# Patient Record
Sex: FEMALE | Race: WHITE | Employment: FULL TIME | URBAN - METROPOLITAN AREA
[De-identification: names, ages, dates, MRNs, and addresses within clinical notes are randomized per-mention and may not be internally consistent; named-entity substitution may affect disease eponyms.]

---

## 2017-05-30 ENCOUNTER — HOSPITAL ENCOUNTER (OUTPATIENT)
Dept: LAB | Age: 47
Discharge: HOME | End: 2017-05-30
Payer: COMMERCIAL

## 2017-05-30 PROCEDURE — 87338 HPYLORI STOOL AG IA: CPT | Performed by: NURSE PRACTITIONER

## 2017-06-02 LAB
H PYLORI AG STL QL IA: NEGATIVE
SPECIMEN SOURCE: NORMAL

## 2017-06-23 ENCOUNTER — HOSPITAL ENCOUNTER (EMERGENCY)
Age: 47
Discharge: HOME | End: 2017-06-23
Attending: FAMILY MEDICINE
Payer: COMMERCIAL

## 2017-06-23 ENCOUNTER — APPOINTMENT (OUTPATIENT)
Dept: GENERAL RADIOLOGY | Age: 47
End: 2017-06-23
Attending: FAMILY MEDICINE
Payer: COMMERCIAL

## 2017-06-23 VITALS
OXYGEN SATURATION: 98 % | WEIGHT: 150 LBS | SYSTOLIC BLOOD PRESSURE: 117 MMHG | HEART RATE: 118 BPM | RESPIRATION RATE: 36 BRPM | BODY MASS INDEX: 25.61 KG/M2 | DIASTOLIC BLOOD PRESSURE: 68 MMHG | HEIGHT: 64 IN | TEMPERATURE: 98.4 F

## 2017-06-23 DIAGNOSIS — J01.10 ACUTE NON-RECURRENT FRONTAL SINUSITIS: ICD-10-CM

## 2017-06-23 DIAGNOSIS — J45.31 MILD PERSISTENT ASTHMA WITH ACUTE EXACERBATION: Primary | ICD-10-CM

## 2017-06-23 LAB
ANION GAP BLD CALC-SCNC: 9 MMOL/L (ref 3–18)
BASOPHILS # BLD AUTO: 0 K/UL (ref 0–0.06)
BASOPHILS # BLD: 0 % (ref 0–2)
BUN SERPL-MCNC: 14 MG/DL (ref 7–18)
BUN/CREAT SERPL: 14 (ref 12–20)
CALCIUM SERPL-MCNC: 9.6 MG/DL (ref 8.5–10.1)
CHLORIDE SERPL-SCNC: 101 MMOL/L (ref 100–108)
CK MB CFR SERPL CALC: NORMAL % (ref 0–4)
CK MB SERPL-MCNC: <1 NG/ML (ref 5–25)
CK SERPL-CCNC: 40 U/L (ref 26–192)
CO2 SERPL-SCNC: 30 MMOL/L (ref 21–32)
CREAT SERPL-MCNC: 1.02 MG/DL (ref 0.6–1.3)
DIFFERENTIAL METHOD BLD: ABNORMAL
EOSINOPHIL # BLD: 0.6 K/UL (ref 0–0.4)
EOSINOPHIL NFR BLD: 6 % (ref 0–5)
ERYTHROCYTE [DISTWIDTH] IN BLOOD BY AUTOMATED COUNT: 13.5 % (ref 11.6–14.5)
GLUCOSE SERPL-MCNC: 103 MG/DL (ref 74–99)
HCT VFR BLD AUTO: 44.6 % (ref 35–45)
HGB BLD-MCNC: 15.2 G/DL (ref 12–16)
LYMPHOCYTES # BLD AUTO: 19 % (ref 21–52)
LYMPHOCYTES # BLD: 2.1 K/UL (ref 0.9–3.6)
MCH RBC QN AUTO: 31.2 PG (ref 24–34)
MCHC RBC AUTO-ENTMCNC: 34.1 G/DL (ref 31–37)
MCV RBC AUTO: 91.6 FL (ref 74–97)
MONOCYTES # BLD: 0.7 K/UL (ref 0.05–1.2)
MONOCYTES NFR BLD AUTO: 7 % (ref 3–10)
NEUTS SEG # BLD: 7.6 K/UL (ref 1.8–8)
NEUTS SEG NFR BLD AUTO: 68 % (ref 40–73)
PLATELET # BLD AUTO: 276 K/UL (ref 135–420)
PMV BLD AUTO: 8.9 FL (ref 9.2–11.8)
POTASSIUM SERPL-SCNC: 3.9 MMOL/L (ref 3.5–5.5)
RBC # BLD AUTO: 4.87 M/UL (ref 4.2–5.3)
SODIUM SERPL-SCNC: 140 MMOL/L (ref 136–145)
TROPONIN I SERPL-MCNC: <0.02 NG/ML (ref 0–0.06)
WBC # BLD AUTO: 11 K/UL (ref 4.6–13.2)

## 2017-06-23 PROCEDURE — 94640 AIRWAY INHALATION TREATMENT: CPT

## 2017-06-23 PROCEDURE — 74011250636 HC RX REV CODE- 250/636: Performed by: FAMILY MEDICINE

## 2017-06-23 PROCEDURE — 77030029684 HC NEB SM VOL KT MONA -A

## 2017-06-23 PROCEDURE — 77030013140 HC MSK NEB VYRM -A

## 2017-06-23 PROCEDURE — 96375 TX/PRO/DX INJ NEW DRUG ADDON: CPT

## 2017-06-23 PROCEDURE — 96365 THER/PROPH/DIAG IV INF INIT: CPT

## 2017-06-23 PROCEDURE — 85025 COMPLETE CBC W/AUTO DIFF WBC: CPT | Performed by: FAMILY MEDICINE

## 2017-06-23 PROCEDURE — 80048 BASIC METABOLIC PNL TOTAL CA: CPT | Performed by: FAMILY MEDICINE

## 2017-06-23 PROCEDURE — 71010 XR CHEST PORT: CPT

## 2017-06-23 PROCEDURE — 82550 ASSAY OF CK (CPK): CPT | Performed by: FAMILY MEDICINE

## 2017-06-23 PROCEDURE — 93005 ELECTROCARDIOGRAM TRACING: CPT

## 2017-06-23 PROCEDURE — 96361 HYDRATE IV INFUSION ADD-ON: CPT

## 2017-06-23 PROCEDURE — 74011000250 HC RX REV CODE- 250: Performed by: FAMILY MEDICINE

## 2017-06-23 PROCEDURE — 99285 EMERGENCY DEPT VISIT HI MDM: CPT

## 2017-06-23 RX ORDER — DEXAMETHASONE SODIUM PHOSPHATE 4 MG/ML
10 INJECTION, SOLUTION INTRA-ARTICULAR; INTRALESIONAL; INTRAMUSCULAR; INTRAVENOUS; SOFT TISSUE
Status: COMPLETED | OUTPATIENT
Start: 2017-06-23 | End: 2017-06-23

## 2017-06-23 RX ORDER — ALBUTEROL SULFATE 0.83 MG/ML
5 SOLUTION RESPIRATORY (INHALATION) ONCE
Status: COMPLETED | OUTPATIENT
Start: 2017-06-23 | End: 2017-06-23

## 2017-06-23 RX ORDER — ALBUTEROL SULFATE 90 UG/1
2 AEROSOL, METERED RESPIRATORY (INHALATION)
Qty: 1 INHALER | Refills: 0 | Status: SHIPPED | OUTPATIENT
Start: 2017-06-23 | End: 2017-10-12

## 2017-06-23 RX ORDER — AMOXICILLIN 875 MG/1
875 TABLET, FILM COATED ORAL 2 TIMES DAILY
Qty: 20 TAB | Refills: 0 | Status: SHIPPED | OUTPATIENT
Start: 2017-06-23 | End: 2017-07-03

## 2017-06-23 RX ORDER — MAGNESIUM SULFATE HEPTAHYDRATE 40 MG/ML
2 INJECTION, SOLUTION INTRAVENOUS ONCE
Status: COMPLETED | OUTPATIENT
Start: 2017-06-23 | End: 2017-06-23

## 2017-06-23 RX ORDER — IPRATROPIUM BROMIDE AND ALBUTEROL SULFATE 2.5; .5 MG/3ML; MG/3ML
3 SOLUTION RESPIRATORY (INHALATION)
Status: COMPLETED | OUTPATIENT
Start: 2017-06-23 | End: 2017-06-23

## 2017-06-23 RX ORDER — METHYLPREDNISOLONE 4 MG/1
TABLET ORAL
Qty: 1 DOSE PACK | Refills: 0 | Status: SHIPPED | OUTPATIENT
Start: 2017-06-23 | End: 2017-08-03 | Stop reason: ALTCHOICE

## 2017-06-23 RX ORDER — HYDROCODONE POLISTIREX AND CHLORPHENIRAMINE POLISTIREX 10; 8 MG/5ML; MG/5ML
5 SUSPENSION, EXTENDED RELEASE ORAL
Qty: 60 ML | Refills: 0 | Status: SHIPPED | OUTPATIENT
Start: 2017-06-23 | End: 2017-08-03 | Stop reason: ALTCHOICE

## 2017-06-23 RX ADMIN — ALBUTEROL SULFATE 5 MG: 2.5 SOLUTION RESPIRATORY (INHALATION) at 12:42

## 2017-06-23 RX ADMIN — SODIUM CHLORIDE 1000 ML: 900 INJECTION, SOLUTION INTRAVENOUS at 11:18

## 2017-06-23 RX ADMIN — IPRATROPIUM BROMIDE AND ALBUTEROL SULFATE 3 ML: .5; 3 SOLUTION RESPIRATORY (INHALATION) at 11:10

## 2017-06-23 RX ADMIN — MAGNESIUM SULFATE IN WATER 2 G: 40 INJECTION, SOLUTION INTRAVENOUS at 11:18

## 2017-06-23 RX ADMIN — SODIUM CHLORIDE 1000 ML: 900 INJECTION, SOLUTION INTRAVENOUS at 13:01

## 2017-06-23 RX ADMIN — ALBUTEROL SULFATE 5 MG: 2.5 SOLUTION RESPIRATORY (INHALATION) at 11:21

## 2017-06-23 RX ADMIN — DEXAMETHASONE SODIUM PHOSPHATE 10 MG: 4 INJECTION, SOLUTION INTRAMUSCULAR; INTRAVENOUS at 11:31

## 2017-06-23 NOTE — ED NOTES
Nebs complete. Pt remains tachypneic but appears less labored at this time. Magnesium and IV fluids infusing. Pt sitting with HOB raised, on CCM and continuous pulse ox, 02 saturations 100% on RA. Pt declines blanket. Call light within reach.

## 2017-06-23 NOTE — ED NOTES
Pt resting in stretcher with IV fluids infusing. Pt reports feeling better. Wheezing remains present but lessened post neb. Call light within reach. No needs verbalized at this time.

## 2017-06-23 NOTE — ED PROVIDER NOTES
[address]  EMERGENCY DEPARTMENT HISTORY AND PHYSICAL EXAM       Date: 6/23/2017   Patient Name: Hilario Felty   YOB: 1970  Medical Record Number: [id number]    History of Presenting Illness     Chief Complaint   Patient presents with    Shortness of Breath        History Provided By:  patient    Additional History: 11:03 AM  Hilario Felty is a 51 y.o. female who presents to the emergency department C/O shortness of breath and chest tightness starting 2 weeks ago. Associated sxs include cough. Pt has been using Proair with no relief of sxs. Reports cigarette use. Jakub hx of asthma. Denies fever, chest pain, leg swelling, recent travel, and any other sxs or complaints.      Primary Care Provider: Patrick Avalos MD   Specialist:    Past History     Past Medical History:   Past Medical History:   Diagnosis Date    Anxiety     Arthritis     Asthma     pt states she does not have asthma    Chronic pain     Chronic Back Pain     Depression     Gastrointestinal disorder     GERD (gastroesophageal reflux disease)     well controlled w/ meds    Hypertension     no medications currently; off [id number]    IBS (irritable bowel syndrome)     Other ill-defined conditions     bulging disc    Other ill-defined conditions     sinusitis    Tattoo     TATTOO        Past Surgical History:   Past Surgical History:   Procedure Laterality Date    ENDOSCOPY, COLON, DIAGNOSTIC      HX BREAST BIOPSY      left    HX CYST INCISION AND DRAINAGE      left    HX GYN  2011    cysts removed from both ovaries    HX HYSTERECTOMY      partial     LAP,CHOLECYSTECTOMY  2-25-16    Dr. Joseph Recio        Family History:   Family History   Problem Relation Age of Onset   [facility] Breast Cancer Mother     Coronary Artery Disease Other     Breast Cancer Sister         Social History:   Social History   Substance Use Topics    Smoking status: Current Every Day Smoker     Packs/day: 0.00     Last attempt to quit: 10/1/2010    Smokeless tobacco: Never Used    Alcohol use No        Allergies: Allergies   Allergen Reactions    Percocet [Oxycodone-Acetaminophen] Nausea and Vomiting    Advair Diskus [Fluticasone-Salmeterol] Nausea and Vomiting    Ibuprofen Nausea and Vomiting        Review of Systems   Review of Systems   Constitutional: Negative for fatigue and fever. HENT: Negative for rhinorrhea and sore throat. Respiratory: Positive for cough, chest tightness and shortness of breath. Cardiovascular: Negative for chest pain, palpitations and leg swelling. Gastrointestinal: Negative for abdominal pain, diarrhea, nausea and vomiting. Genitourinary: Negative for difficulty urinating and dysuria. Musculoskeletal: Negative for arthralgias and myalgias. Skin: Negative for color change and rash. Neurological: Negative for light-headedness and headaches. All other systems reviewed and are negative. Physical Exam  Vitals:    06/23/17 1242 06/23/17 1300 06/23/17 1315 06/23/17 1330   BP:  130/69 109/64 116/61   Pulse:  (!) 115 (!) 115 (!) 116   Resp:  18 26 24   Temp:       SpO2: 100% 100% 99% 99%   Weight:       Height:           Physical Exam  Vital signs and nursing notes reviewed    CONSTITUTIONAL: Alert, middle aged female in mild respiratory distress; well-developed; well-nourished. HEAD:  Normocephalic, atraumatic  EYES: PERRL; EOM's intact. ENTM: Nose: no rhinorrhea; Throat: no erythema or exudate, mucous membranes moist  Neck:  No JVD, supple without lymphadenopathy  RESP: Tachypneic, with diffuse inspiratory and expiratory wheezes. Frequent cough. CV: Tachycardic, S1 and S2 WNL; No murmurs, gallops or rubs. GI: Normal bowel sounds, abdomen soft and non-tender. No masses or organomegaly. : No costo-vertebral angle tenderness. BACK:  Non-tender  UPPER EXT:  Normal inspection  LOWER EXT: No edema, no calf tenderness. Distal pulses intact.   NEURO: CN intact, reflexes 2/4 and sym, strength 5/5 and sym, sensation intact. SKIN: No rashes; Normal for age and stage. PSYCH:  Alert and oriented, normal affect. Diagnostic Study Results     Labs -      Recent Results (from the past 12 hour(s))   CBC WITH AUTOMATED DIFF    Collection Time: 06/23/17 11:11 AM   Result Value Ref Range    WBC 11.0 4.6 - 13.2 K/uL    RBC 4.87 4.20 - 5.30 M/uL    HGB 15.2 12.0 - 16.0 g/dL    HCT 44.6 35.0 - 45.0 %    MCV 91.6 74.0 - 97.0 FL    MCH 31.2 24.0 - 34.0 PG    MCHC 34.1 31.0 - 37.0 g/dL    RDW 13.5 11.6 - 14.5 %    PLATELET 120 364 - 296 K/uL    MPV 8.9 (L) 9.2 - 11.8 FL    NEUTROPHILS 68 40 - 73 %    LYMPHOCYTES 19 (L) 21 - 52 %    MONOCYTES 7 3 - 10 %    EOSINOPHILS 6 (H) 0 - 5 %    BASOPHILS 0 0 - 2 %    ABS. NEUTROPHILS 7.6 1.8 - 8.0 K/UL    ABS. LYMPHOCYTES 2.1 0.9 - 3.6 K/UL    ABS. MONOCYTES 0.7 0.05 - 1.2 K/UL    ABS. EOSINOPHILS 0.6 (H) 0.0 - 0.4 K/UL    ABS.  BASOPHILS 0.0 0.0 - 0.06 K/UL    DF AUTOMATED     METABOLIC PANEL, BASIC    Collection Time: 06/23/17 11:11 AM   Result Value Ref Range    Sodium 140 136 - 145 mmol/L    Potassium 3.9 3.5 - 5.5 mmol/L    Chloride 101 100 - 108 mmol/L    CO2 30 21 - 32 mmol/L    Anion gap 9 3.0 - 18 mmol/L    Glucose 103 (H) 74 - 99 mg/dL    BUN 14 7.0 - 18 MG/DL    Creatinine 1.02 0.6 - 1.3 MG/DL    BUN/Creatinine ratio 14 12 - 20      GFR est AA >60 >60 ml/min/1.73m2    GFR est non-AA 58 (L) >60 ml/min/1.73m2    Calcium 9.6 8.5 - 10.1 MG/DL   CARDIAC PANEL,(CK, CKMB & TROPONIN)    Collection Time: 06/23/17 11:11 AM   Result Value Ref Range    CK 40 26 - 192 U/L    CK - MB <1.0 <3.6 ng/ml    CK-MB Index Cannot be calulated 0.0 - 4.0 %    Troponin-I, Qt. <0.02 0.00 - 0.06 NG/ML   EKG, 12 LEAD, INITIAL    Collection Time: 06/23/17 11:25 AM   Result Value Ref Range    Ventricular Rate 101 BPM    Atrial Rate 101 BPM    P-R Interval 128 ms    QRS Duration 76 ms    Q-T Interval 330 ms    QTC Calculation (Bezet) 427 ms    Calculated P Axis 30 degrees Calculated R Axis 24 degrees    Calculated T Axis 19 degrees    Diagnosis       Sinus tachycardia  Anterior infarct , age undetermined  Abnormal ECG  When compared with ECG of 21-NOV-2016 14:52,  Anterior infarct is now present         Radiologic Studies -  The following have been ordered and reviewed:  XR CHEST PORT   Final Result   Impression:  No radiographic evidence of an acute abnormality. Stable examination. As read by the radiologist.      Medical Decision Making   I am the first provider for this patient. I reviewed the vital signs, available nursing notes, past medical history, past surgical history, family history and social history. Vital Signs-Reviewed the patient's vital signs. Patient Vitals for the past 12 hrs:   Temp Pulse Resp BP SpO2   06/23/17 1330 - (!) 116 24 116/61 99 %   06/23/17 1315 - (!) 115 26 109/64 99 %   06/23/17 1300 - (!) 115 18 130/69 100 %   06/23/17 1242 - - - - 100 %   06/23/17 1220 - (!) 101 (!) 31 108/63 100 %   06/23/17 1200 - (!) 101 27 113/62 100 %   06/23/17 1146 - (!) 110 15 106/68 100 %   06/23/17 1130 - (!) 101 18 118/74 100 %   06/23/17 1121 - (!) 128 - (!) 119/91 -   06/23/17 1107 98.4 °F (36.9 °C) (!) 140 26 (!) 133/103 100 %       Pulse Oximetry Analysis - Normal 100% on room air     Cardiac Monitor:   Rate: 119 bpm  Rhythm: Sinus Tachycardia     EKG interpretation: (Preliminary)  Rhythm: Sinus tachycardia. Rate (approx.): 101 bpm; Nonspecific changes. QTc 427 ms. EKG read by Eben Brasher MD at 11:25 AM    Old Medical Records: Old medical records. Nursing notes. Provider Notes: INITIAL CLINICAL IMPRESSION and PLANS:  The patient presents with the primary complaint(s) of: chest tightness and shortness of breath. The presentation, to include historical aspects and clinical findings are consistent with the DX of asthma exacerbation. However, other possible DX's to consider as primary, associated with, or exacerbated by include:    1. Pneumonia  2. CHF    Considering the above, my initial management plan to evaluate and therapeutic interventions include the following and as noted in the orders:    1. Labs: Cardiac Panel, BMP, CBC  2. Imaging: Chest X-ray, EKG      Procedures:   Procedures    ED Course:  11:03 AM  Initial assessment performed. The patients presenting problems have been discussed, and they are in agreement with the care plan formulated and outlined with them. I have encouraged them to ask questions as they arise throughout their visit. 12:21 PM The patient is still wheezing. Her blood pressure is a little low. Will give another liter of fluids. 1:49 PM Patient is feeling better and has better air movement. She states that she has been having nasal congestion and sinus drainage for the past 2 weeks which is thick and yellow. Patient is ready for discharge. Medications Given in the ED:  Medications   sodium chloride 0.9 % bolus infusion 1,000 mL (0 mL IntraVENous IV Completed 6/23/17 1250)   magnesium sulfate 2 g/50 ml IVPB (premix or compounded) (0 g IntraVENous IV Completed 6/23/17 1223)   albuterol (PROVENTIL VENTOLIN) nebulizer solution 5 mg (5 mg Nebulization Given 6/23/17 1121)   dexamethasone (DECADRON) 4 mg/mL injection 10 mg (10 mg IntraVENous Given 6/23/17 1131)   albuterol-ipratropium (DUO-NEB) 2.5 MG-0.5 MG/3 ML (3 mL Nebulization Given 6/23/17 1110)   sodium chloride 0.9 % bolus infusion 1,000 mL (1,000 mL IntraVENous New Bag 6/23/17 1301)   albuterol (PROVENTIL VENTOLIN) nebulizer solution 5 mg (5 mg Nebulization Given 6/23/17 1242)       Discharge Note:  1:52 PM   Pt has been reexamined. Patient has no new complaints, changes, or physical findings. Care plan outlined and precautions discussed. Results were reviewed with the patient. All medications were reviewed with the patient; will d/c home with Albuterol, Medrol bonifacio, Tussionex, and Amoxicillin. All of pt's questions and concerns were addressed. Patient was instructed and agrees to follow up with her PCP, as well as to return to the ED upon further deterioration. Patient is ready to go home. Critical Care Time:  I have spent 30 minutes of critical care time involved in lab review, consultations with specialist, family decision-making, and documentation. During this entire length of time I was immediately available to the patient. Critical Care: The reason for providing this level of medical care for this critically ill patient was due a critical illness that impaired one or more vital organ systems such that there was a high probability of imminent or life threatening deterioration in the patients condition. This care involved high complexity decision making to assess, manipulate, and support vital system functions, to treat this degreee vital organ system failure and to prevent further life threatening deterioration of the patients condition. Diagnosis   Clinical Impression:   1. Mild persistent asthma with acute exacerbation    2. Acute non-recurrent frontal sinusitis         Discussion:  Patient presented with trouble breathing she was wheezing, with tachypnea and tachycardia. She had multiple nebs, Magnesium Sulfate, and Solumedrol. She responded well to this. She had no pneumonia, and a normal white count. Her EKG was normal. She is going to be treated for acute sinusitis as well as reactive airway.      Follow-up Information     Follow up With Details Comments Contact Info    Camille Howard MD Schedule an appointment as soon as possible for a visit in 2 days For primary care follow up [address] [place] [address]      [facility] EMERGENCY DEPT  As needed, If symptoms worsen [address] [place] [id number] [phone]          Current Discharge Medication List      START taking these medications    Details   methylPREDNISolone (MEDROL, DANNI,) 4 mg tablet Take as directed  Qty: 1 Dose Pack, Refills: 0 chlorpheniramine-HYDROcodone (TUSSIONEX PENNKINETIC ER) 10-8 mg/5 mL suspension Take 5 mL by mouth every twelve (12) hours as needed for Cough. Max Daily Amount: 10 mL. Qty: 60 mL, Refills: 0      amoxicillin (AMOXIL) 875 mg tablet Take 1 Tab by mouth two (2) times a day for 10 days. Qty: 20 Tab, Refills: 0         CONTINUE these medications which have CHANGED    Details   albuterol (PROVENTIL HFA, VENTOLIN HFA, PROAIR HFA) 90 mcg/actuation inhaler Take 2 Puffs by inhalation every four (4) hours as needed for Wheezing. Qty: 1 Inhaler, Refills: 0             _______________________________   Attestations: This note is prepared by Isaac Lainez, acting as a Scribe for Eben Brasher MD on 11:06 AM on 6/23/2017 . Eben Brasher MD: The scribe's documentation has been prepared under my direction and personally reviewed by me in its entirety.   _______________________________

## 2017-06-23 NOTE — ED TRIAGE NOTES
Patient with complaints of shortness of breath and chest tightness. Patient in visible distress. Patient states that she has been fighting a cold for 2 weeks. Sepsis Screening completed    (  )Patient meets SIRS criteria. (x  )Patient does not meet SIRS criteria.       SIRS Criteria is achieved when two or more of the following are present   Temperature < 96.8°F (36°C) or > 100.9°F (38.3°C)   Heart Rate > 90 beats per minute   Respiratory Rate > 20 breaths per minute   WBC count > 12,000 or <4,000 or > 10% bands

## 2017-06-23 NOTE — DISCHARGE INSTRUCTIONS
Asthma Attack: Care Instructions  Your Care Instructions    During an asthma attack, the airways swell and narrow. This makes it hard to breathe. Severe asthma attacks can be life-threatening, but you can help prevent them by keeping your asthma under control and treating symptoms before they get bad. Symptoms include being short of breath, having chest tightness, coughing, and wheezing. Noting and treating these symptoms can also help you avoid future trips to the emergency room. The doctor has checked you carefully, but problems can develop later. If you notice any problems or new symptoms, get medical treatment right away. Follow-up care is a key part of your treatment and safety. Be sure to make and go to all appointments, and call your doctor if you are having problems. It's also a good idea to know your test results and keep a list of the medicines you take. How can you care for yourself at home? · Follow your asthma action plan to prevent and treat attacks. If you don't have an asthma action plan, work with your doctor to create one. · Take your asthma medicines exactly as prescribed. Talk to your doctor right away if you have any questions about how to take them. ¨ Use your quick-relief medicine when you have symptoms of an attack. Quick-relief medicine is usually an albuterol inhaler. Some people need to use quick-relief medicine before they exercise. ¨ Take your controller medicine every day, not just when you have symptoms. Controller medicine is usually an inhaled corticosteroid. The goal is to prevent problems before they occur. Don't use your controller medicine to treat an attack that has already started. It doesn't work fast enough to help. ¨ If your doctor prescribed corticosteroid pills to use during an attack, take them exactly as prescribed. It may take hours for the pills to work, but they may make the episode shorter and help you breathe better.   ¨ Keep your quick-relief medicine with you at all times. · Talk to your doctor before using other medicines. Some medicines, such as aspirin, can cause asthma attacks in some people. · If you have a peak flow meter, use it to check how well you are breathing. This can help you predict when an asthma attack is going to occur. Then you can take medicine to prevent the asthma attack or make it less severe. · Do not smoke or allow others to smoke around you. Avoid smoky places. Smoking makes asthma worse. If you need help quitting, talk to your doctor about stop-smoking programs and medicines. These can increase your chances of quitting for good. · Learn what triggers an asthma attack for you, and avoid the triggers when you can. Common triggers include colds, smoke, air pollution, dust, pollen, mold, pets, cockroaches, stress, and cold air. · Avoid colds and the flu. Get a pneumococcal vaccine shot. If you have had one before, ask your doctor if you need a second dose. Get a flu vaccine every fall. If you must be around people with colds or the flu, wash your hands often. When should you call for help? Call 911 anytime you think you may need emergency care. For example, call if:  · You have severe trouble breathing. Call your doctor now or seek immediate medical care if:  · Your symptoms do not get better after you have followed your asthma action plan. · You have new or worse trouble breathing. · Your coughing and wheezing get worse. · You cough up dark brown or bloody mucus (sputum). · You have a new or higher fever. Watch closely for changes in your health, and be sure to contact your doctor if:  · You need to use quick-relief medicine on more than 2 days a week (unless it is just for exercise). · You cough more deeply or more often, especially if you notice more mucus or a change in the color of your mucus. · You are not getting better as expected. Where can you learn more?   Go to http://fernando-chriss.info/. Enter E480 in the search box to learn more about \"Asthma Attack: Care Instructions. \"  Current as of: March 25, 2017  Content Version: 11.3  © 4059-9134 [employer]. Care instructions adapted under license by [employer] (which disclaims liability or warranty for this information). If you have questions about a medical condition or this instruction, always ask your healthcare professional. [address] any warranty or liability for your use of this information. Sinusitis: Care Instructions  Your Care Instructions    Sinusitis is an infection of the lining of the sinus cavities in your head. Sinusitis often follows a cold. It causes pain and pressure in your head and face. In most cases, sinusitis gets better on its own in 1 to 2 weeks. But some mild symptoms may last for several weeks. Sometimes antibiotics are needed. Follow-up care is a key part of your treatment and safety. Be sure to make and go to all appointments, and call your doctor if you are having problems. It's also a good idea to know your test results and keep a list of the medicines you take. How can you care for yourself at home? · Take an over-the-counter pain medicine, such as acetaminophen (Tylenol), ibuprofen (Advil, Motrin), or naproxen (Aleve). Read and follow all instructions on the label. · If the doctor prescribed antibiotics, take them as directed. Do not stop taking them just because you feel better. You need to take the full course of antibiotics. · Be careful when taking over-the-counter cold or flu medicines and Tylenol at the same time. Many of these medicines have acetaminophen, which is Tylenol. Read the labels to make sure that you are not taking more than the recommended dose. Too much acetaminophen (Tylenol) can be harmful. · Breathe warm, moist air from a steamy shower, a hot bath, or a sink filled with hot water.  Avoid cold, dry air. Using a humidifier in your home may help. Follow the directions for cleaning the machine. · Use saline (saltwater) nasal washes to help keep your nasal passages open and wash out mucus and bacteria. You can buy saline nose drops at a grocery store or drugstore. Or you can make your own at home by adding 1 teaspoon of salt and 1 teaspoon of baking soda to 2 cups of distilled water. If you make your own, fill a bulb syringe with the solution, insert the tip into your nostril, and squeeze gently. Jelly Niyah your nose. · Put a hot, wet towel or a warm gel pack on your face 3 or 4 times a day for 5 to 10 minutes each time. · Try a decongestant nasal spray like oxymetazoline (Afrin). Do not use it for more than 3 days in a row. Using it for more than 3 days can make your congestion worse. When should you call for help? Call your doctor now or seek immediate medical care if:  · You have new or worse swelling or redness in your face or around your eyes. · You have a new or higher fever. Watch closely for changes in your health, and be sure to contact your doctor if:  · You have new or worse facial pain. · The mucus from your nose becomes thicker (like pus) or has new blood in it. · You are not getting better as expected. Where can you learn more? Go to http://fernando-chriss.info/. Enter E667 in the search box to learn more about \"Sinusitis: Care Instructions. \"  Current as of: July 29, 2016  Content Version: 11.3  © 4239-1241 [employer]. Care instructions adapted under license by [employer] (which disclaims liability or warranty for this information). If you have questions about a medical condition or this instruction, always ask your healthcare professional. [address] any warranty or liability for your use of this information.

## 2017-06-23 NOTE — ED NOTES
Rounded on pt. Pt resting in stretcher with HOB raised. MD to place orders for additional breathing treatments. Pt reports feeling improved. Magnesium and IV fluids continue to infuse. Call light within reach.

## 2017-06-24 LAB
ATRIAL RATE: 101 BPM
CALCULATED P AXIS, ECG09: 30 DEGREES
CALCULATED R AXIS, ECG10: 24 DEGREES
CALCULATED T AXIS, ECG11: 19 DEGREES
DIAGNOSIS, 93000: NORMAL
P-R INTERVAL, ECG05: 128 MS
Q-T INTERVAL, ECG07: 330 MS
QRS DURATION, ECG06: 76 MS
QTC CALCULATION (BEZET), ECG08: 427 MS
VENTRICULAR RATE, ECG03: 101 BPM

## 2017-07-13 ENCOUNTER — OFFICE VISIT (OUTPATIENT)
Dept: ORTHOPEDIC SURGERY | Age: 47
End: 2017-07-13

## 2017-07-13 VITALS
TEMPERATURE: 98.3 F | SYSTOLIC BLOOD PRESSURE: 111 MMHG | HEIGHT: 64 IN | BODY MASS INDEX: 26.12 KG/M2 | OXYGEN SATURATION: 98 % | DIASTOLIC BLOOD PRESSURE: 76 MMHG | RESPIRATION RATE: 16 BRPM | WEIGHT: 153 LBS | HEART RATE: 97 BPM

## 2017-07-13 DIAGNOSIS — M79.2 NEURITIS: ICD-10-CM

## 2017-07-13 DIAGNOSIS — M54.6 THORACIC SPINE PAIN: ICD-10-CM

## 2017-07-13 DIAGNOSIS — M47.816 LUMBAR FACET ARTHROPATHY: ICD-10-CM

## 2017-07-13 DIAGNOSIS — M54.50 PAIN OF LUMBAR SPINE: ICD-10-CM

## 2017-07-13 DIAGNOSIS — M54.2 NECK PAIN: ICD-10-CM

## 2017-07-13 DIAGNOSIS — M79.18 MYOFASCIAL PAIN: ICD-10-CM

## 2017-07-13 DIAGNOSIS — M51.36 DDD (DEGENERATIVE DISC DISEASE), LUMBAR: Primary | ICD-10-CM

## 2017-07-13 RX ORDER — GABAPENTIN 300 MG/1
CAPSULE ORAL
Qty: 60 CAP | Refills: 1 | Status: SHIPPED | OUTPATIENT
Start: 2017-07-13 | End: 2017-10-09 | Stop reason: ALTCHOICE

## 2017-07-13 NOTE — MR AVS SNAPSHOT
Visit Information Date & Time Provider Department Dept. Phone Encounter #  
 7/13/2017 10:00 AM Leopoldo Bolt, 27 [facility] [phone] [id number] Follow-up Instructions Return in about 3 weeks (around 8/3/2017) for Diagnostic Test follow up, Medication follow up. Upcoming Health Maintenance Date Due Pneumococcal 19-64 Medium Risk (1 of 1 - PPSV23) 11/10/1988 DTaP/Tdap/Td series (1 - Tdap) 11/10/1990 PAP AKA CERVICAL CYTOLOGY 11/10/1990 INFLUENZA AGE 9 TO ADULT 8/1/2017 Allergies as of 7/13/2017  Review Complete On: 7/13/2017 By: Leopoldo Bolt, MD  
  
 Severity Noted Reaction Type Reactions Percocet [Oxycodone-acetaminophen] Medium 01/16/2013   Side Effect Nausea and Vomiting Advair Diskus [Fluticasone-salmeterol]  10/07/2015    Nausea and Vomiting Ibuprofen  11/14/2013    Nausea and Vomiting Current Immunizations  Never Reviewed No immunizations on file. Not reviewed this visit You Were Diagnosed With   
  
 Codes Comments DDD (degenerative disc disease), lumbar    -  Primary ICD-10-CM: M51.36 
ICD-9-CM: 722.52 Neck pain     ICD-10-CM: M54.2 ICD-9-CM: 723.1 Pain of lumbar spine     ICD-10-CM: M54.5 ICD-9-CM: 724.2 Thoracic spine pain     ICD-10-CM: M54.6 ICD-9-CM: 724.1 Lumbar facet arthropathy (HCC)     ICD-10-CM: M12.88 ICD-9-CM: 721.3 Neuritis     ICD-10-CM: M79.2 ICD-9-CM: 729.2 Myofascial pain     ICD-10-CM: M79.1 ICD-9-CM: 729.1 Vitals BP Pulse Temp Resp Height(growth percentile) Weight(growth percentile) 111/76 97 98.3 °F (36.8 °C) (Oral) 16 5' 4\" (1.626 m) 153 lb (69.4 kg) LMP SpO2 BMI OB Status Smoking Status 01/01/2013 98% 26.26 kg/m2 Hysterectomy Current Every Day Smoker BMI and BSA Data Body Mass Index Body Surface Area  
 26.26 kg/m 2 1.77 m 2 Preferred Pharmacy Pharmacy Name Phone [phone]. [phone] Your Updated Medication List  
  
   
This list is accurate as of: 7/13/17 11:23 AM.  Always use your most recent med list.  
  
  
  
  
 albuterol 90 mcg/actuation inhaler Commonly known as:  PROVENTIL HFA, VENTOLIN HFA, PROAIR HFA Take 2 Puffs by inhalation every four (4) hours as needed for Wheezing. ALPRAZolam 0.5 mg tablet Commonly known as:  Kyle Flack Take 1 Tab by mouth nightly as needed for Anxiety. carisoprodol 250 mg tablet Commonly known as:  SOMA Take 250 mg by mouth as needed for Muscle Spasm(s). celecoxib 100 mg capsule Commonly known as:  CELEBREX Take  by mouth two (2) times a day. chlorpheniramine-HYDROcodone 10-8 mg/5 mL suspension Commonly known as:  Mariaelena Doshi ER Take 5 mL by mouth every twelve (12) hours as needed for Cough. Max Daily Amount: 10 mL.  
  
 famotidine 40 mg tablet Commonly known as:  PEPCID Take 40 mg by mouth daily. gabapentin 300 mg capsule Commonly known as:  NEURONTIN Take 1 in the evening for 1 week then increase to 2  Indications: NEUROPATHIC PAIN  
  
 HYDROmorphone 2 mg tablet Commonly known as:  DILAUDID Take 1 - 2 tablets every 4 -6 hours as needed for pain  
  
 losartan 100 mg tablet Commonly known as:  COZAAR Take 100 mg by mouth daily. meclizine 25 mg tablet Commonly known as:  ANTIVERT Take  by mouth three (3) times daily as needed. methylPREDNISolone 4 mg tablet Commonly known as:  MEDROL (DANNI) Take as directed NORVASC 5 mg tablet Generic drug:  amLODIPine Take 5 mg by mouth daily. VITAMIN D3 1,000 unit tablet Generic drug:  cholecalciferol Take 3,000 Units by mouth daily. ZyrTEC 10 mg tablet Generic drug:  cetirizine Take  by mouth daily as needed. Prescriptions Sent to Pharmacy Refills  
 gabapentin (NEURONTIN) 300 mg capsule 1 Sig: Take 1 in the evening for 1 week then increase to 2  Indications: NEUROPATHIC PAIN Class: Normal  
 [facility]: [id number] [address] [place] [address].  #: [phone] We Performed the Following AMB POC XRAY, SPINE, CERVICAL; 2 OR 3 [52820 CPT(R)] AMB POC XRAY, SPINE, LUMBOSACRAL; 2 O [93750 CPT(R)] Follow-up Instructions Return in about 3 weeks (around 8/3/2017) for Diagnostic Test follow up, Medication follow up. To-Do List   
 07/20/2017 Imaging:  MRI LUMB SPINE WO CONT   
  
 07/21/2017 10:30 AM  
  Appointment with THE [facility] MRI RM 1 at THE [facility] RAD MRI ([phone]) GENERAL INSTRUCTIONS  Bring information (ID card) if you have any medically implanted devices. You will be required to lie still while the procedure is being performed. Remove any jewelry (including body piercing, hairpins) prior to MRI. If you have had a creatinine level drawn within the past 30 days, please bring most recent results to your appt. Bring any films, CD's, and reports related to your study with you on the day of your exam.  This only includes studies done outside of [address] [place] [address], [facility], [place], and [facility]. Bring a complete list of all medications you are currently taking to include prescriptions, over-the-counter meds, herbals, vitamins & any dietary supplements. If you were given medications for claustrophobia or anxiety, please arrange to have someone drive you to your appointment. QUESTIONS  Notify the MRI Department if you have any questions concerning your study. [place] - [phone] [facility] - [phone] Patient Instructions JAMF Softwaret Activation Thank you for requesting access to Verient. Please follow the instructions below to securely access and download your online medical record.  Verient allows you to send messages to your doctor, view your test results, renew your prescriptions, schedule appointments, and more. How Do I Sign Up? 1. In your internet browser, go to [email or web address]. [employer] 
2. Click on the First Time User? Click Here link in the Sign In box. You will be redirect to the New Member Sign Up page. 3. Enter your FoundValue Access Code exactly as it appears below. You will not need to use this code after youve completed the sign-up process. If you do not sign up before the expiration date, you must request a new code. FoundValue Access Code: [email or web address] Expires: 2017  1:52 PM (This is the date your FoundValue access code will ) 4. Enter the last four digits of your Social Security Number (xxxx) and Date of Birth (mm/dd/yyyy) as indicated and click Submit. You will be taken to the next sign-up page. 5. Create a FoundValue ID. This will be your FoundValue login ID and cannot be changed, so think of one that is secure and easy to remember. 6. Create a FoundValue password. You can change your password at any time. 7. Enter your Password Reset Question and Answer. This can be used at a later time if you forget your password. 8. Enter your e-mail address. You will receive e-mail notification when new information is available in [address] 9. Click Sign Up. You can now view and download portions of your medical record. 10. Click the Download Summary menu link to download a portable copy of your medical information. Additional Information If you have questions, please visit the Frequently Asked Questions section of the FoundValue website at [email or web address] Remember, FoundValue is NOT to be used for urgent needs. For medical emergencies, dial 911. Neck Arthritis: Exercises Your Care Instructions Here are some examples of typical rehabilitation exercises for your condition. Start each exercise slowly. Ease off the exercise if you start to have pain. Your doctor or physical therapist will tell you when you can start these exercises and which ones will work best for you. How to do the exercises Neck stretches to the side 1. This stretch works best if you keep your shoulder down as you lean away from it. To help you remember to do this, start by relaxing your shoulders and lightly holding on to your thighs or your chair. 2. Tilt your head toward your shoulder and hold for 15 to 30 seconds. Let the weight of your head stretch your muscles. 3. Repeat 2 to 4 times toward each shoulder. Chin tuck 1. Lie on the floor with a rolled-up towel under your neck. Your head should be touching the floor. 2. Slowly bring your chin toward your chest. 
3. Hold for a count of 6, and then relax for up to 10 seconds. 4. Repeat 8 to 12 times. Active cervical rotation 1. Sit in a firm chair, or stand up straight. 2. Keeping your chin level, turn your head to the right, and hold for 15 to 30 seconds. 3. Turn your head to the left and hold for 15 to 30 seconds. 4. Repeat 2 to 4 times to each side. Shoulder blade squeeze 1. While standing, squeeze your shoulder blades together. 2. Do not raise your shoulders up as you are squeezing. 3. Hold for 6 seconds. 4. Repeat 8 to 12 times. Shoulder rolls 1. Sit comfortably with your feet shoulder-width apart. You can also do this exercise standing up. 2. Roll your shoulders up, then back, and then down in a smooth, circular motion. 3. Repeat 2 to 4 times. Follow-up care is a key part of your treatment and safety. Be sure to make and go to all appointments, and call your doctor if you are having problems. It's also a good idea to know your test results and keep a list of the medicines you take. Where can you learn more? Go to http://fernando-chriss.info/. Enter Y304 in the search box to learn more about \"Neck Arthritis: Exercises. \" Current as of: March 21, 2017 Content Version: 11.3 © 5872-6737 Leap. Care instructions adapted under license by BlueRonin (which disclaims liability or warranty for this information). If you have questions about a medical condition or this instruction, always ask your healthcare professional. [address] any warranty or liability for your use of this information. Low Back Arthritis: Exercises Your Care Instructions Here are some examples of typical rehabilitation exercises for your condition. Start each exercise slowly. Ease off the exercise if you start to have pain. Your doctor or physical therapist will tell you when you can start these exercises and which ones will work best for you. When you are not being active, find a comfortable position for rest. Some people are comfortable on the floor or a medium-firm bed with a small pillow under their head and another under their knees. Some people prefer to lie on their side with a pillow between their knees. Don't stay in one position for too long. Take short walks (10 to 20 minutes) every 2 to 3 hours. Avoid slopes, hills, and stairs until you feel better. Walk only distances you can manage without pain, especially leg pain. How to do the exercises Pelvic tilt 4. Lie on your back with your knees bent. 5. \"Brace\" your stomachtighten your muscles by pulling in and imagining your belly button moving toward your spine. 6. Press your lower back into the floor. You should feel your hips and pelvis rock back. 7. Hold for 6 seconds while breathing smoothly. 8. Relax and allow your pelvis and hips to rock forward. 9. Repeat 8 to 12 times. Back stretches 5. Get down on your hands and knees on the floor. 6. Relax your head and allow it to droop. Round your back up toward the ceiling until you feel a nice stretch in your upper, middle, and lower back. Hold this stretch for as long as it feels comfortable, or about 15 to 30 seconds. 7. Return to the starting position with a flat back while you are on your hands and knees. 8. Let your back sway by pressing your stomach toward the floor. Lift your buttocks toward the ceiling. 9. Hold this position for 15 to 30 seconds. 10. Repeat 2 to 4 times. Follow-up care is a key part of your treatment and safety. Be sure to make and go to all appointments, and call your doctor if you are having problems. It's also a good idea to know your test results and keep a list of the medicines you take. Where can you learn more? Go to http://fernando-chriss.info/. Enter B615 in the search box to learn more about \"Low Back Arthritis: Exercises. \" Current as of: March 21, 2017 Content Version: 11.3 © 9612-4700 Healthwise, Incorporated. Care instructions adapted under license by Showroomprive (which disclaims liability or warranty for this information). If you have questions about a medical condition or this instruction, always ask your healthcare professional. [place] [address] any warranty or liability for your use of this information. Introducing [facility] & HE[facility]! Pj Choi introduces QRuso patient portal. Now you can access parts of your medical record, email your doctor's office, and request medication refills online. 1. In your internet browser, go to [email or web address] 2. Click on the First Time User? Click Here link in the Sign In box. You will see the New Member Sign Up page. 3. Enter your QRuso Access Code exactly as it appears below. You will not need to use this code after youve completed the sign-up process. If you do not sign up before the expiration date, you must request a new code. · QRuso Access Code: [email or web address] Expires: 9/21/2017  1:52 PM 
 
4. Enter the last four digits of your Social Security Number (xxxx) and Date of Birth (mm/dd/yyyy) as indicated and click Submit.  You will be taken to the next sign-up page. 5. Create a Bosse Tools ID. This will be your Bosse Tools login ID and cannot be changed, so think of one that is secure and easy to remember. 6. Create a Bosse Tools password. You can change your password at any time. 7. Enter your Password Reset Question and Answer. This can be used at a later time if you forget your password. 8. Enter your e-mail address. You will receive e-mail notification when new information is available in [address] 9. Click Sign Up. You can now view and download portions of your medical record. 10. Click the Download Summary menu link to download a portable copy of your medical information. If you have questions, please visit the Frequently Asked Questions section of the Bosse Tools website. Remember, Bosse Tools is NOT to be used for urgent needs. For medical emergencies, dial 911. Now available from your iPhone and Android! Please provide this summary of care documentation to your next provider. Your primary care clinician is listed as Monserrat Palmer. If you have any questions after today's visit, please call [phone].

## 2017-07-13 NOTE — PATIENT INSTRUCTIONS
Revivn Activation    Thank you for requesting access to Revivn. Please follow the instructions below to securely access and download your online medical record. Revivn allows you to send messages to your doctor, view your test results, renew your prescriptions, schedule appointments, and more. How Do I Sign Up? 1. In your internet browser, go to [email or web address]  2. Click on the First Time User? Click Here link in the Sign In box. You will be redirect to the New Member Sign Up page. 3. Enter your Revivn Access Code exactly as it appears below. You will not need to use this code after youve completed the sign-up process. If you do not sign up before the expiration date, you must request a new code. Revivn Access Code: [email or web address]  Expires: 2017  1:52 PM (This is the date your Revivn access code will )    4. Enter the last four digits of your Social Security Number (xxxx) and Date of Birth (mm/dd/yyyy) as indicated and click Submit. You will be taken to the next sign-up page. 5. Create a Revivn ID. This will be your Revivn login ID and cannot be changed, so think of one that is secure and easy to remember. 6. Create a Revivn password. You can change your password at any time. 7. Enter your Password Reset Question and Answer. This can be used at a later time if you forget your password. 8. Enter your e-mail address. You will receive e-mail notification when new information is available in [address] 9. Click Sign Up. You can now view and download portions of your medical record. 10. Click the Download Summary menu link to download a portable copy of your medical information. Additional Information    If you have questions, please visit the Frequently Asked Questions section of the Revivn website at [email or web address] Remember, Revivn is NOT to be used for urgent needs. For medical emergencies, dial 911.          Neck Arthritis: Exercises  Your Care Instructions  Here are some examples of typical rehabilitation exercises for your condition. Start each exercise slowly. Ease off the exercise if you start to have pain. Your doctor or physical therapist will tell you when you can start these exercises and which ones will work best for you. How to do the exercises  Neck stretches to the side    1. This stretch works best if you keep your shoulder down as you lean away from it. To help you remember to do this, start by relaxing your shoulders and lightly holding on to your thighs or your chair. 2. Tilt your head toward your shoulder and hold for 15 to 30 seconds. Let the weight of your head stretch your muscles. 3. Repeat 2 to 4 times toward each shoulder. Chin tuck    1. Lie on the floor with a rolled-up towel under your neck. Your head should be touching the floor. 2. Slowly bring your chin toward your chest.  3. Hold for a count of 6, and then relax for up to 10 seconds. 4. Repeat 8 to 12 times. Active cervical rotation    1. Sit in a firm chair, or stand up straight. 2. Keeping your chin level, turn your head to the right, and hold for 15 to 30 seconds. 3. Turn your head to the left and hold for 15 to 30 seconds. 4. Repeat 2 to 4 times to each side. Shoulder blade squeeze    1. While standing, squeeze your shoulder blades together. 2. Do not raise your shoulders up as you are squeezing. 3. Hold for 6 seconds. 4. Repeat 8 to 12 times. Shoulder rolls    1. Sit comfortably with your feet shoulder-width apart. You can also do this exercise standing up. 2. Roll your shoulders up, then back, and then down in a smooth, circular motion. 3. Repeat 2 to 4 times. Follow-up care is a key part of your treatment and safety. Be sure to make and go to all appointments, and call your doctor if you are having problems. It's also a good idea to know your test results and keep a list of the medicines you take. Where can you learn more?   Go to http://fernando-chriss.info/. Enter Z902 in the search box to learn more about \"Neck Arthritis: Exercises. \"  Current as of: March 21, 2017  Content Version: 11.3  © 2487-9811 SOHM. Care instructions adapted under license by NETpeas (which disclaims liability or warranty for this information). If you have questions about a medical condition or this instruction, always ask your healthcare professional. [address] any warranty or liability for your use of this information. Low Back Arthritis: Exercises  Your Care Instructions  Here are some examples of typical rehabilitation exercises for your condition. Start each exercise slowly. Ease off the exercise if you start to have pain. Your doctor or physical therapist will tell you when you can start these exercises and which ones will work best for you. When you are not being active, find a comfortable position for rest. Some people are comfortable on the floor or a medium-firm bed with a small pillow under their head and another under their knees. Some people prefer to lie on their side with a pillow between their knees. Don't stay in one position for too long. Take short walks (10 to 20 minutes) every 2 to 3 hours. Avoid slopes, hills, and stairs until you feel better. Walk only distances you can manage without pain, especially leg pain. How to do the exercises  Pelvic tilt    4. Lie on your back with your knees bent. 5. \"Brace\" your stomach--tighten your muscles by pulling in and imagining your belly button moving toward your spine. 6. Press your lower back into the floor. You should feel your hips and pelvis rock back. 7. Hold for 6 seconds while breathing smoothly. 8. Relax and allow your pelvis and hips to rock forward. 9. Repeat 8 to 12 times. Back stretches    5. Get down on your hands and knees on the floor. 6. Relax your head and allow it to droop.  Round your back up toward the ceiling until you feel a nice stretch in your upper, middle, and lower back. Hold this stretch for as long as it feels comfortable, or about 15 to 30 seconds. 7. Return to the starting position with a flat back while you are on your hands and knees. 8. Let your back sway by pressing your stomach toward the floor. Lift your buttocks toward the ceiling. 9. Hold this position for 15 to 30 seconds. 10. Repeat 2 to 4 times. Follow-up care is a key part of your treatment and safety. Be sure to make and go to all appointments, and call your doctor if you are having problems. It's also a good idea to know your test results and keep a list of the medicines you take. Where can you learn more? Go to http://fernando-chriss.info/. Enter I641 in the search box to learn more about \"Low Back Arthritis: Exercises. \"  Current as of: March 21, 2017  Content Version: 11.3  © 7321-1983 Comic Reply, Incorporated. Care instructions adapted under license by LOOKK (which disclaims liability or warranty for this information). If you have questions about a medical condition or this instruction, always ask your healthcare professional. [address] any warranty or liability for your use of this information.

## 2017-07-13 NOTE — PROGRESS NOTES
[facility]  [address] [place], [facility] [address]  Phone: [phone]  Fax: [phone]      ASSESSMENT   Gela Luong was seen today for back pain, follow-up and neck pain. Diagnoses and all orders for this visit:    DDD (degenerative disc disease), lumbar  -     MRI LUMB SPINE WO CONT; Future    Lumbar facet arthropathy (HCC)  -     MRI LUMB SPINE WO CONT; Future    Neuritis  -     gabapentin (NEURONTIN) 300 mg capsule; Take 1 in the evening for 1 week then increase to 2  Indications: NEUROPATHIC PAIN  -     MRI LUMB SPINE WO CONT; Future    Myofascial pain  -     MRI LUMB SPINE WO CONT; Future    Thoracic spine pain  -     Cancel: [38774] T Spine 2V  -     MRI LUMB SPINE WO CONT; Future    Neck pain  -     [95615] C Spine 2-3V  -     MRI LUMB SPINE WO CONT; Future    Pain of lumbar spine  -     [71684] L/S 2-3 views  -     MRI LUMB SPINE WO CONT; Future         IMPRESSION AND PLAN:  Catia Woods is a 51 y.o. right hand dominant female with history of neck and lower back pain x 6+ months. Pt c/o pain in the left leg and states that when she was last seen in 2014 her pain was right sided. She also c/o pain in the neck and a vibrating, electrical, numbing sensation on the left shoulder that radiates throughout the entire hand. 1) Pt was given information on cervical and lumbar arthritis exercises. 2) A lumbar MRI was ordered. Pt has new left radicular symptoms and her last MRI was in 2013. She has previously completed physical therapy and is on Celebrex. 3) Pt was prescribed Neurontin 300 mg 1 tab QAM and 2 tabs QHS, tapering up as directed. 4) She was given information on how to use a TheraCane. 5) Ms. Analilia Cooney has a reminder for a \"due or due soon\" health maintenance. I have asked that she contact her primary care provider, Venora Kawasaki, MD, for follow-up on this health maintenance. 6)  demonstrated consistency with prescribing.    7) Pt will follow-up in 3 weeks. HISTORY OF PRESENT ILLNESS:  Darlene Ramirez is a 51 y.o. right hand dominant female with history of neck and lower back pain x 6+ months. Pt presents to the office today as a new self referred patient. She was last seen in 2014. Pt c/o pain in the left leg and states that over the years it has progressively worsened. She used to be able sleep on her right side and back without pain but now all positions in bed cause pain. Pt admits that when she was last seen in 2014 her pain was right sided and has since shifted to the left side. She reports pain when standing, walking, and sitting for prolonged periods of time. Pt also c/o pain in the neck and a vibrating, electrical, numbing sensation on the left shoulder that radiates throughout the entire hand. She admits that occasionally she wakes up and has to shake her left hand due to numbness. Pt feels as if she has \"grabbed an electric fence\" in the left shoulder when her pain is severe. She has never tried Neurontin, Topamax, or Lyrica. Pt tried physical therapy years ago with temporary relief. She takes Celebrex 100 mg daily. She recently discontinued long term prednisone and denies any improvement in her neck or back pain when taking the medication. Pt at this time desires to proceed with medication evaluation and a lumbar MRI. Pt is a smoker and is currently trying to quit. Of note, Pt works the night shift, 7PM-7AM at the ER.     Pain Scale: 3/10     PCP: Estela Lo MD      Past Medical History:   Diagnosis Date    Anxiety     Arthritis     Asthma     pt states she does not have asthma    Chronic pain     Chronic Back Pain     Depression     Gastrointestinal disorder     GERD (gastroesophageal reflux disease)     well controlled w/ meds    Hypertension     no medications currently; off [id number]    IBS (irritable bowel syndrome)     Other ill-defined conditions     bulging disc    Other ill-defined conditions     sinusitis    Tattoo     TATTOO        Social History     Social History    Marital status:      Spouse name: N/A    Number of children: N/A    Years of education: N/A     Occupational History    Not on file. Social History Main Topics    Smoking status: Current Every Day Smoker     Packs/day: 0.00     Last attempt to quit: 10/1/2010    Smokeless tobacco: Never Used    Alcohol use No    Drug use: No    Sexual activity: Not on file     Other Topics Concern    Not on file     Social History Narrative       Current Outpatient Prescriptions   Medication Sig Dispense Refill    gabapentin (NEURONTIN) 300 mg capsule Take 1 in the evening for 1 week then increase to 2  Indications: NEUROPATHIC PAIN 60 Cap 1    albuterol (PROVENTIL HFA, VENTOLIN HFA, PROAIR HFA) 90 mcg/actuation inhaler Take 2 Puffs by inhalation every four (4) hours as needed for Wheezing. 1 Inhaler 0    methylPREDNISolone (MEDROL, DANNI,) 4 mg tablet Take as directed 1 Dose Pack 0    chlorpheniramine-HYDROcodone (TUSSIONEX PENNKINETIC ER) 10-8 mg/5 mL suspension Take 5 mL by mouth every twelve (12) hours as needed for Cough. Max Daily Amount: 10 mL. 60 mL 0    HYDROmorphone (DILAUDID) 2 mg tablet Take 1 - 2 tablets every 4 -6 hours as needed for pain 40 Tab 0    carisoprodol (SOMA) 250 mg tablet Take 250 mg by mouth as needed for Muscle Spasm(s).  meclizine (ANTIVERT) 25 mg tablet Take  by mouth three (3) times daily as needed.  famotidine (PEPCID) 40 mg tablet Take 40 mg by mouth daily.  celecoxib (CELEBREX) 100 mg capsule Take  by mouth two (2) times a day.  losartan (COZAAR) 100 mg tablet Take 100 mg by mouth daily.  amLODIPine (NORVASC) 5 mg tablet Take 5 mg by mouth daily.  ALPRAZolam (XANAX) 0.5 mg tablet Take 1 Tab by mouth nightly as needed for Anxiety. 7 Tab 0    cholecalciferol (VITAMIN D3) 1,000 unit tablet Take 3,000 Units by mouth daily.       cetirizine (ZYRTEC) 10 mg tablet Take  by mouth daily as needed. Allergies   Allergen Reactions    Percocet [Oxycodone-Acetaminophen] Nausea and Vomiting    Advair Diskus [Fluticasone-Salmeterol] Nausea and Vomiting    Ibuprofen Nausea and Vomiting       REVIEW OF SYSTEMS    Constitutional: Negative for fever, chills, or weight change. Respiratory: Negative for cough or shortness of breath. Cardiovascular: Negative for chest pain or palpitations. Gastrointestinal: Negative for acid reflux, change in bowel habits, or constipation. Genitourinary: Negative for dysuria and flank pain. Musculoskeletal: Positive for cervical and lumbar pain. Skin: Negative for rash. Neurological: Positive for numbness in the left arm and hand. Negative for headaches or dizziness. Endo/Heme/Allergies: Negative for increased bruising. Psychiatric/Behavioral: Negative for difficulty with sleep. PHYSICAL EXAMINATION  Visit Vitals    /76    Pulse 97    Temp 98.3 °F (36.8 °C) (Oral)    Resp 16    Ht 5' 4\" (1.626 m)    Wt 153 lb (69.4 kg)    LMP 01/01/2013    SpO2 98%    BMI 26.26 kg/m2       Constitutional: Awake, alert, and in no acute distress  HEENT: Normocephalic. Atraumatic. Oropharynx is moist and clear. PERRL. EOMI. Sclerae are nonicteric  Heart: Regular rate and rhythm  Lungs: Clear to auscultation bilaterally  Abdomen: Soft and nontender. Bowel sounds are present  Neurological: 1+ symmetrical DTRs in the upper extremities. 1+ symmetrical DTRs in the lower extremities. Decreased sensation to light touch in the right lower extremity. Negative Horacio's sign bilaterally. Skin: warm, dry, and intact. Musculoskeletal: Good ROM in the cervical spine on all planes with pain. Tenderness to palpation across the upper trapezius with scattered trigger points. Negative Spurling's test bilaterally. Moderate pain with extension and axial loading. Better with forward flexion. No pain with internal or external rotation of her hips.  Positive straight leg raise bilaterally, L>R. No pain with heel or toe walking. No difficulty with the single leg stand bilaterally. Biceps  Triceps Deltoids Wrist Ext Wrist Flex Hand Intrin   Right +4/5 +4/5 +4/5 +4/5 +4/5 +4/5   Left +4/5 +4/5 +4/5 +4/5 +4/5 +4/5      Hip Flex  Quads Hamstrings Ankle DF EHL Ankle PF   Right +4/5 +4/5 +4/5 +4/5 +4/5 +4/5   Left +4/5 +4/5 +4/5 +4/5 +4/5 +4/5     IMAGING:    Cervical spine 2V x-rays from 07/13/2017 was personally reviewed with the Pt and demonstrated:  1) Multilevel degenerative facets. Lumbar spine 4V x-rays from 07/13/2017 was personally reviewed with the Pt and demonstrated:  1) Very mild dextroscoliosis. 2) Degenerative disc at L4-5 and L5-S1. 3) Multilevel degenerative facets. Lumbar spine MRI from 12/12/2013 was personally reviewed with the Pt and demonstrated:    Results from [place] encounter on 12/12/13   MRI LUMB SPINE WO CONT   Narrative Lumbar spine MR:    Indication: Severe low back pain. Chronic pain. No history available of  extremity symptoms. Procedure: Sagittal spin echo T1 and T2 images of the lumbar spine. A sagittal  FSE inversion recovery, STIR, scan was also performed. Axial spin echo T1 as  well as angled axial T2 FSE scans were also performed. Comparison 4/8/11. Findings: The lumbar spine is normally aligned. There is no destructive lesion. .  There is degenerative disc disease with disc dessication at several levels. Axial scans show:    L1/L2: This level is normal.    L2/L3: This level is normal.    L3/L4: Normal except for very slight facet degenerative change. L4/L5: Mild bulging disc and facet arthropathy. No protrusion or extrusion and  no significant stenosis. L5/S1: There is a focal broad-based right paracentral protrusion.  This does  extend to the thecal sac and the margin of the right S1 root causing very  minimal asymmetry of epidural fat anteromedial to the root on T1 images but not  displacing or distorting the root. The size of the protrusion is unchanged. There is facet arthropathy. No significant foraminal stenosis. The more rostral discs are included on parasagittal scans up to T10-T11. There  is right paracentral focal spondylosis at T11-T12 with mild cord distortion of  the distal conus. No cord signal change. This was not included in the axial  evaluation but is unchanged likely from the previous MRI on sagittal scans. No  additional herniation or stenosis is seen. The inferior most conus medullaris  is otherwise intrinsically unremarkable. Impression Impression:    1. No definite interval change. 2. Right paracentral protrusion/focal spondylosis at L5-S1. This does not with  confidence to affect the right S1 root and there is no significant central  stenosis. 3. Focal right paracentral spondylosis at T11-T12 with mild distortion of the  conus without cord signal change. This is likely unchanged although was not  included on axial evaluation. 3. No additional evidence of herniation or significant stenosis. Written by Henok Sofia, as dictated by María Queen MD.  I, Dr. María Queen confirm that all documentation is accurate.

## 2017-07-14 PROBLEM — M79.2 NEURITIS: Status: ACTIVE | Noted: 2017-07-14

## 2017-07-14 PROBLEM — M47.816 LUMBAR FACET ARTHROPATHY: Status: ACTIVE | Noted: 2017-07-14

## 2017-07-14 PROBLEM — M79.18 MYOFASCIAL PAIN: Status: ACTIVE | Noted: 2017-07-14

## 2017-07-14 PROBLEM — M51.36 DDD (DEGENERATIVE DISC DISEASE), LUMBAR: Status: ACTIVE | Noted: 2017-07-14

## 2017-07-21 ENCOUNTER — HOSPITAL ENCOUNTER (OUTPATIENT)
Dept: MRI IMAGING | Age: 47
Discharge: HOME | End: 2017-07-21
Attending: PHYSICAL MEDICINE & REHABILITATION
Payer: COMMERCIAL

## 2017-07-21 DIAGNOSIS — M54.6 THORACIC SPINE PAIN: ICD-10-CM

## 2017-07-21 DIAGNOSIS — M54.2 NECK PAIN: ICD-10-CM

## 2017-07-21 DIAGNOSIS — M47.816 LUMBAR FACET ARTHROPATHY: ICD-10-CM

## 2017-07-21 DIAGNOSIS — M79.2 NEURITIS: ICD-10-CM

## 2017-07-21 DIAGNOSIS — M54.50 PAIN OF LUMBAR SPINE: ICD-10-CM

## 2017-07-21 DIAGNOSIS — M51.36 DDD (DEGENERATIVE DISC DISEASE), LUMBAR: ICD-10-CM

## 2017-07-21 DIAGNOSIS — M79.18 MYOFASCIAL PAIN: ICD-10-CM

## 2017-07-21 PROCEDURE — 72148 MRI LUMBAR SPINE W/O DYE: CPT

## 2017-08-03 ENCOUNTER — OFFICE VISIT (OUTPATIENT)
Dept: ORTHOPEDIC SURGERY | Age: 47
End: 2017-08-03

## 2017-08-03 VITALS
WEIGHT: 152.8 LBS | SYSTOLIC BLOOD PRESSURE: 140 MMHG | BODY MASS INDEX: 26.09 KG/M2 | RESPIRATION RATE: 16 BRPM | HEIGHT: 64 IN | DIASTOLIC BLOOD PRESSURE: 80 MMHG | HEART RATE: 93 BPM

## 2017-08-03 DIAGNOSIS — R20.0 LEFT ARM NUMBNESS: ICD-10-CM

## 2017-08-03 DIAGNOSIS — M51.36 BULGE OF LUMBAR DISC WITHOUT MYELOPATHY: Primary | ICD-10-CM

## 2017-08-03 DIAGNOSIS — M79.2 NEURITIS: ICD-10-CM

## 2017-08-03 DIAGNOSIS — M50.30 DDD (DEGENERATIVE DISC DISEASE), CERVICAL: ICD-10-CM

## 2017-08-03 DIAGNOSIS — M79.18 MYOFASCIAL PAIN: ICD-10-CM

## 2017-08-03 RX ORDER — TOPIRAMATE 25 MG/1
TABLET ORAL
Qty: 90 TAB | Refills: 1 | Status: SHIPPED | OUTPATIENT
Start: 2017-08-03 | End: 2017-10-05 | Stop reason: SDUPTHER

## 2017-08-03 RX ORDER — BUDESONIDE AND FORMOTEROL FUMARATE DIHYDRATE 160; 4.5 UG/1; UG/1
AEROSOL RESPIRATORY (INHALATION)
Refills: 0 | COMMUNITY
Start: 2017-07-15

## 2017-08-03 RX ORDER — DICYCLOMINE HYDROCHLORIDE 10 MG/1
CAPSULE ORAL
Refills: 0 | COMMUNITY
Start: 2017-07-07 | End: 2021-10-28 | Stop reason: ALTCHOICE

## 2017-08-03 NOTE — PATIENT INSTRUCTIONS
Low Back Arthritis: Exercises  Your Care Instructions  Here are some examples of typical rehabilitation exercises for your condition. Start each exercise slowly. Ease off the exercise if you start to have pain. Your doctor or physical therapist will tell you when you can start these exercises and which ones will work best for you. When you are not being active, find a comfortable position for rest. Some people are comfortable on the floor or a medium-firm bed with a small pillow under their head and another under their knees. Some people prefer to lie on their side with a pillow between their knees. Don't stay in one position for too long. Take short walks (10 to 20 minutes) every 2 to 3 hours. Avoid slopes, hills, and stairs until you feel better. Walk only distances you can manage without pain, especially leg pain. How to do the exercises  Pelvic tilt    1. Lie on your back with your knees bent. 2. \"Brace\" your stomach--tighten your muscles by pulling in and imagining your belly button moving toward your spine. 3. Press your lower back into the floor. You should feel your hips and pelvis rock back. 4. Hold for 6 seconds while breathing smoothly. 5. Relax and allow your pelvis and hips to rock forward. 6. Repeat 8 to 12 times. Back stretches    1. Get down on your hands and knees on the floor. 2. Relax your head and allow it to droop. Round your back up toward the ceiling until you feel a nice stretch in your upper, middle, and lower back. Hold this stretch for as long as it feels comfortable, or about 15 to 30 seconds. 3. Return to the starting position with a flat back while you are on your hands and knees. 4. Let your back sway by pressing your stomach toward the floor. Lift your buttocks toward the ceiling. 5. Hold this position for 15 to 30 seconds. 6. Repeat 2 to 4 times. Follow-up care is a key part of your treatment and safety.  Be sure to make and go to all appointments, and call your doctor if you are having problems. It's also a good idea to know your test results and keep a list of the medicines you take. Where can you learn more? Go to http://fernando-chriss.info/. Enter C023 in the search box to learn more about \"Low Back Arthritis: Exercises. \"  Current as of: March 21, 2017  Content Version: 11.3  © 6820-2159 [employer]. Care instructions adapted under license by [employer] (which disclaims liability or warranty for this information). If you have questions about a medical condition or this instruction, always ask your healthcare professional. [place] [address] any warranty or liability for your use of this information. Neck Arthritis: Exercises  Your Care Instructions  Here are some examples of typical rehabilitation exercises for your condition. Start each exercise slowly. Ease off the exercise if you start to have pain. Your doctor or physical therapist will tell you when you can start these exercises and which ones will work best for you. How to do the exercises  Neck stretches to the side    7. This stretch works best if you keep your shoulder down as you lean away from it. To help you remember to do this, start by relaxing your shoulders and lightly holding on to your thighs or your chair. 8. Tilt your head toward your shoulder and hold for 15 to 30 seconds. Let the weight of your head stretch your muscles. 9. Repeat 2 to 4 times toward each shoulder. Chin tuck    7. Lie on the floor with a rolled-up towel under your neck. Your head should be touching the floor. 8. Slowly bring your chin toward your chest.  9. Hold for a count of 6, and then relax for up to 10 seconds. 10. Repeat 8 to 12 times. Active cervical rotation    1. Sit in a firm chair, or stand up straight. 2. Keeping your chin level, turn your head to the right, and hold for 15 to 30 seconds.   3. Turn your head to the left and hold for 15 to 30 seconds. 4. Repeat 2 to 4 times to each side. Shoulder blade squeeze    1. While standing, squeeze your shoulder blades together. 2. Do not raise your shoulders up as you are squeezing. 3. Hold for 6 seconds. 4. Repeat 8 to 12 times. Shoulder rolls    1. Sit comfortably with your feet shoulder-width apart. You can also do this exercise standing up. 2. Roll your shoulders up, then back, and then down in a smooth, circular motion. 3. Repeat 2 to 4 times. Follow-up care is a key part of your treatment and safety. Be sure to make and go to all appointments, and call your doctor if you are having problems. It's also a good idea to know your test results and keep a list of the medicines you take. Where can you learn more? Go to http://fernando-chriss.info/. Enter S247 in the search box to learn more about \"Neck Arthritis: Exercises. \"  Current as of: March 21, 2017  Content Version: 11.3  © 9937-9302 [employer]. Care instructions adapted under license by [employer] (which disclaims liability or warranty for this information). If you have questions about a medical condition or this instruction, always ask your healthcare professional. [address] any warranty or liability for your use of this information.

## 2017-08-03 NOTE — PROGRESS NOTES
[facility]  [address] [place], [facility] [address]  Phone: [phone]  Fax: [phone]      ASSESSMENT   Diagnoses and all orders for this visit:    1. Bulge of lumbar disc without myelopathy  -     topiramate (TOPAMAX) 25 mg tablet; Take 1 in the evening for 1 week, then increase to 2 the second week and continue with 3 in the evening    2. Neuritis  -     topiramate (TOPAMAX) 25 mg tablet; Take 1 in the evening for 1 week, then increase to 2 the second week and continue with 3 in the evening  -     MRI CERV SPINE WO CONT; Future    3. Myofascial pain  -     MRI CERV SPINE WO CONT; Future    4. Left arm numbness  -     MRI CERV SPINE WO CONT; Future    5. DDD (degenerative disc disease), cervical  -     MRI CERV SPINE WO CONT; Future         IMPRESSION AND PLAN:  Ayanna Astudillo is a 51 y.o. right hand dominant female with history of cervical and lumbar pain. She experiences pain in the lower back that radiates down both legs. Pt admits to experiencing right leg pain in the past with the recent onset of left leg pain. She c/o pain in the neck that extends to the left shoulder with numbness in the fingertips on the left. 1) Pt was given information on lumbar arthritis exercises. 2) Discussed treatment options with the Pt, including medication and steroid injections. 3) She was prescribed Topamax 25 mg 3 tabs QHS, tapering up as directed. 4) I recommended the Pt intermittently try a lumbar support. 5) I encouraged the Pt perform core strengthening exercises and water exercise. 6) I recommended the Pt avoid bending and lifting and was informed of proper lifting mechanics. 7) A cervical MRI was order . She has weakness in the left arm. 7) Ms. Yvon Crocker has a reminder for a \"due or due soon\" health maintenance. I have asked that she contact her primary care provider, Billy Aguiar MD, for follow-up on this health maintenance.   8)  demonstrated consistency with prescribing. 9) Pt will follow-up in 2-3 weeks. HISTORY OF PRESENT ILLNESS:  Ayanna Astudillo is a 51 y.o. right hand dominant female with history of cervical and lumbar pain. She presents to the office today for lumbar MRI follow up. Pt experiences pain in the lower back that radiates down both legs. She also reports numbness in the legs. Pt admits to experiencing right leg pain in the past with the recent onset of left leg pain. She reports difficulty sleeping due to her leg pain and states that she generally sleeps about 2 hours a day. Pt admits that her pain has progressively worsened over the years. She reports increased pain in the lower back when vacuuming. She also c/o intermittent middle and upper back pain, particularly when bending. Pt reports pain in the neck that radiates to the left shoulder with numbness and tingling in the finger tips in the left. She states that her neck pain is more severe than her lower back pain at this time. Pt tried cervical physical therapy years ago. Pt tried 3 epidural lumbar injections in the past and experienced temporary relief with only her 1st injection. She has been taking Neurontin 300 mg but admits to sedation with the medication. Pt discontinued the medication and noticed improvement in her drowsiness. She works night and states that she generally took the Neurontin during the day. Pt denies ever trying Topamax and has no history of renal stones or glaucoma. Of note, Pt is allergic to ibuprofen, Aleve, and Percocet. Pt at this time desires to proceed with medication evaluation. 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      Pain Scale: 5/10    PCP: Billy Aguiar MD       Past Medical History:   Diagnosis Date    Anxiety     Arthritis     Asthma     pt states she does not have asthma    Chronic pain     Chronic Back Pain     Depression     Gastrointestinal disorder     GERD (gastroesophageal reflux disease)     well controlled w/ meds    Hypertension     no medications currently; off [id number]    IBS (irritable bowel syndrome)     Other ill-defined conditions     bulging disc    Other ill-defined conditions     sinusitis    Tattoo     TATTOO        Social History     Social History    Marital status:      Spouse name: N/A    Number of children: N/A    Years of education: N/A     Occupational History    Not on file. Social History Main Topics    Smoking status: Current Every Day Smoker     Packs/day: 0.00     Last attempt to quit: 10/1/2010    Smokeless tobacco: Never Used    Alcohol use No    Drug use: No    Sexual activity: Not on file     Other Topics Concern    Not on file     Social History Narrative       Current Outpatient Prescriptions   Medication Sig Dispense Refill    SYMBICORT 160-4.5 mcg/actuation HFA inhaler inhale 2 puffs by mouth twice a day IN THE MORNING AND EVENING  0    dicyclomine (BENTYL) 10 mg capsule take 1 capsule by mouth three times a day  0    topiramate (TOPAMAX) 25 mg tablet Take 1 in the evening for 1 week, then increase to 2 the second week and continue with 3 in the evening 90 Tab 1    gabapentin (NEURONTIN) 300 mg capsule Take 1 in the evening for 1 week then increase to 2  Indications: NEUROPATHIC PAIN 60 Cap 1    albuterol (PROVENTIL HFA, VENTOLIN HFA, PROAIR HFA) 90 mcg/actuation inhaler Take 2 Puffs by inhalation every four (4) hours as needed for Wheezing. 1 Inhaler 0    HYDROmorphone (DILAUDID) 2 mg tablet Take 1 - 2 tablets every 4 -6 hours as needed for pain 40 Tab 0    carisoprodol (SOMA) 250 mg tablet Take 250 mg by mouth as needed for Muscle Spasm(s).  meclizine (ANTIVERT) 25 mg tablet Take  by mouth three (3) times daily as needed.  famotidine (PEPCID) 40 mg tablet Take 40 mg by mouth daily.  celecoxib (CELEBREX) 100 mg capsule Take  by mouth two (2) times a day.       losartan (COZAAR) 100 mg tablet Take 100 mg by mouth daily.      amLODIPine (NORVASC) 5 mg tablet Take 5 mg by mouth daily.  ALPRAZolam (XANAX) 0.5 mg tablet Take 1 Tab by mouth nightly as needed for Anxiety. 7 Tab 0    cholecalciferol (VITAMIN D3) 1,000 unit tablet Take 3,000 Units by mouth daily.  cetirizine (ZYRTEC) 10 mg tablet Take  by mouth daily as needed. Allergies   Allergen Reactions    Percocet [Oxycodone-Acetaminophen] Nausea and Vomiting    Advair Diskus [Fluticasone-Salmeterol] Nausea and Vomiting    Ibuprofen Nausea and Vomiting         REVIEW OF SYSTEMS    Constitutional: Negative for fever, chills, or weight change. Respiratory: Negative for cough or shortness of breath. Cardiovascular: Negative for chest pain or palpitations. Gastrointestinal: Negative for acid reflux, change in bowel habits, or constipation. Genitourinary: Negative for dysuria and flank pain. Musculoskeletal: Positive for lumbar pain. Skin: Negative for rash. Neurological: Positive for numbness in both legs and in the finger tips in the left hand. Negative for headaches or dizziness. Endo/Heme/Allergies: Negative for increased bruising. Psychiatric/Behavioral: Positive for difficulty with sleep. PHYSICAL EXAMINATION  Visit Vitals    /80    Pulse 93    Resp 16    Ht 5' 4\" (1.626 m)    Wt 152 lb 12.8 oz (69.3 kg)    LMP 01/01/2013    BMI 26.23 kg/m2     Constitutional: Awake, alert, and in no acute distress  Neurological: 1+ symmetrical DTRs in the lower extremities. Decreased sensation to light touch in the left thumb. Skin: warm, dry, and intact. Musculoskeletal: Significant tightness across the upper trapezius. Tenderness to palpation in the lower lumbar region. No pain with extension, axial loading, or forward flexion. No pain with internal or external rotation of her hips.  Negative straight leg raise bilaterally; UE strength-- 4+/5 penny for biceps, triceps, deltoids,  strength, wrist extension, and hand intrinsics Hip Flex  Quads Hamstrings Ankle DF EHL Ankle PF   Right +4/5 +4/5 +4/5 +4/5 +4/5 +4/5   Left 4/5 4/5 4/5 4/5 4/5 4/5     IMAGING:    Cervical spine 2V x-rays from 07/13/2017 was personally reviewed with the Pt and demonstrated:  1) Multilevel degenerative facets; mild degenerative disc - C4/5    Lumbar spine MRI from 07/21/2017 was personally reviewed with the Pt and demonstrated:    Results from [place] encounter on 07/21/17   MRI LUMB SPINE WO CONT   Narrative MRI Lumbar spine    History: increase new left radicular symptoms, not able to walk more than 10-15  mins, bilateral leg pain greater on the left    Comparison: MR lumbar spine 12/12/2013    Technique: Multiplanar multi sequence MR imaging of the lumbar spine was  performed utilizing sagittal T1, T2, STIR, and axial T2 and T1 sequences. Findings:     Normal lumbar spine vertebral alignment is present without subluxation. Osseous  marrow signal is within normal limits. A pars defect is not seen. The conus  medullaris is located at the inferior T12 level and has a normal appearance and  signal.     T11-12 level: Axial imaging not performed at this level. Disc bulge is present  with superimposed right central disc protrusion which does produce focal cord  deformity. No abnormal cord signal is seen. No central stenosis is seen. This  level shows no definite interval change, although axial imaging not performed on  either study. T12-L1 level: No axial imaging performed at this level. There is no central or  foraminal stenosis. L1/2 level: There is no central or foraminal stenosis. L2/3 level: There is no central or foraminal stenosis. L3/4 level: There is no central or foraminal stenosis. L4/5 level: Mild disc bulge is present. There is a superimposed focal left  foraminal disc extrusion which minimally extend superiorly as seen on sagittal  image 3. This disc herniation is slightly increased since prior study.  The  exiting left L4 nerve root is contacted without definite mass effect. Very mild  facet arthropathy is seen. No central stenosis is present. L5/S1 level: Disc bulge is present. There is a superimposed right  central/subarticular disc protrusion which does produce mild mass effect on the  descending right S1 nerve root. Mass effect upon the right S1 nerve root has  increased since the prior study. No central stenosis is produced. The visualized retroperitoneum is unremarkable. Impression IMPRESSION:    1. Right central/subarticular L5-S1 disc protrusion with mild mass effect on  descending right S1 nerve root, increased since prior 2013 study. Clinical  correlation with possible right S1 radiculopathy is recommended. 2. Left foraminal L4-5 focal disc extrusion minimally extending superiorly  contacting exiting left L4 nerve root without definite mass effect. Clinical  correlation with possible left L4 radiculopathy is recommended. 3. Grossly stable T11-T12 right central disc protrusion producing focal cord  deformity but no abnormal cord signal or high-grade stenosis. 4. Other lumbar levels are unremarkable without high-grade central or foraminal  stenosis. Written by Yoly Albrecht, as dictated by Yordan Cummins MD.  I, Dr. Yordan Cummins confirm that all documentation is accurate.

## 2017-08-03 NOTE — MR AVS SNAPSHOT
Visit Information Date & Time Provider Department Dept. Phone Encounter #  
 8/3/2017 10:30 AM Lolita Brown MD [facility] [place] [phone] [id number] Follow-up Instructions Return in about 1 month (around 9/3/2017) for Medication follow up, Diagnostic Test follow up. Upcoming Health Maintenance Date Due Pneumococcal 19-64 Medium Risk (1 of 1 - PPSV23) 11/10/1988 DTaP/Tdap/Td series (1 - Tdap) 11/10/1990 PAP AKA CERVICAL CYTOLOGY 11/10/1990 INFLUENZA AGE 9 TO ADULT 8/1/2017 Allergies as of 8/3/2017  Review Complete On: 8/3/2017 By: Lolita Brown MD  
  
 Severity Noted Reaction Type Reactions Percocet [Oxycodone-acetaminophen] Medium 01/16/2013   Side Effect Nausea and Vomiting Advair Diskus [Fluticasone-salmeterol]  10/07/2015    Nausea and Vomiting Ibuprofen  11/14/2013    Nausea and Vomiting Current Immunizations  Never Reviewed No immunizations on file. Not reviewed this visit You Were Diagnosed With   
  
 Codes Comments Bulge of lumbar disc without myelopathy    -  Primary ICD-10-CM: M51.26 
ICD-9-CM: 722.10 Neuritis     ICD-10-CM: M79.2 ICD-9-CM: 729.2 Myofascial pain     ICD-10-CM: M79.1 ICD-9-CM: 729.1 Left arm numbness     ICD-10-CM: R20.0 ICD-9-CM: 782.0   
 DDD (degenerative disc disease), cervical     ICD-10-CM: M50.30 ICD-9-CM: 722.4 Vitals BP Pulse Resp Height(growth percentile) Weight(growth percentile) LMP  
 140/80 93 16 5' 4\" (1.626 m) 152 lb 12.8 oz (69.3 kg) 01/01/2013 BMI OB Status Smoking Status 26.23 kg/m2 Hysterectomy Current Every Day Smoker BMI and BSA Data Body Mass Index Body Surface Area  
 26.23 kg/m 2 1.77 m 2 Preferred Pharmacy Pharmacy Name Phone [id number] [address] [place] [address]. [phone] Your Updated Medication List  
  
   
 This list is accurate as of: 8/3/17 12:21 PM.  Always use your most recent med list.  
  
  
  
  
 albuterol 90 mcg/actuation inhaler Commonly known as:  PROVENTIL HFA, VENTOLIN HFA, PROAIR HFA Take 2 Puffs by inhalation every four (4) hours as needed for Wheezing. ALPRAZolam 0.5 mg tablet Commonly known as:  Laymond Darwin Take 1 Tab by mouth nightly as needed for Anxiety. carisoprodol 250 mg tablet Commonly known as:  SOMA Take 250 mg by mouth as needed for Muscle Spasm(s). celecoxib 100 mg capsule Commonly known as:  CELEBREX Take  by mouth two (2) times a day. dicyclomine 10 mg capsule Commonly known as:  BENTYL  
take 1 capsule by mouth three times a day  
  
 famotidine 40 mg tablet Commonly known as:  PEPCID Take 40 mg by mouth daily. gabapentin 300 mg capsule Commonly known as:  NEURONTIN Take 1 in the evening for 1 week then increase to 2  Indications: NEUROPATHIC PAIN  
  
 HYDROmorphone 2 mg tablet Commonly known as:  DILAUDID Take 1 - 2 tablets every 4 -6 hours as needed for pain  
  
 losartan 100 mg tablet Commonly known as:  COZAAR Take 100 mg by mouth daily. meclizine 25 mg tablet Commonly known as:  ANTIVERT Take  by mouth three (3) times daily as needed. NORVASC 5 mg tablet Generic drug:  amLODIPine Take 5 mg by mouth daily. SYMBICORT 160-4.5 mcg/actuation HFA inhaler Generic drug:  budesonide-formoterol  
inhale 2 puffs by mouth twice a day IN THE MORNING AND EVENING  
  
 topiramate 25 mg tablet Commonly known as:  TOPAMAX Take 1 in the evening for 1 week, then increase to 2 the second week and continue with 3 in the evening VITAMIN D3 1,000 unit tablet Generic drug:  cholecalciferol Take 3,000 Units by mouth daily. ZyrTEC 10 mg tablet Generic drug:  cetirizine Take  by mouth daily as needed. Prescriptions Sent to Pharmacy Refills topiramate (TOPAMAX) 25 mg tablet 1 Sig: Take 1 in the evening for 1 week, then increase to 2 the second week and continue with 3 in the evening Class: Normal  
 [facility]acy: [id number] [address] [place] [address].  #: [phone] Follow-up Instructions Return in about 1 month (around 9/3/2017) for Medication follow up, Diagnostic Test follow up. To-Do List   
 08/10/2017 Imaging:  MRI CERV SPINE WO CONT Patient Instructions Low Back Arthritis: Exercises Your Care Instructions Here are some examples of typical rehabilitation exercises for your condition. Start each exercise slowly. Ease off the exercise if you start to have pain. Your doctor or physical therapist will tell you when you can start these exercises and which ones will work best for you. When you are not being active, find a comfortable position for rest. Some people are comfortable on the floor or a medium-firm bed with a small pillow under their head and another under their knees. Some people prefer to lie on their side with a pillow between their knees. Don't stay in one position for too long. Take short walks (10 to 20 minutes) every 2 to 3 hours. Avoid slopes, hills, and stairs until you feel better. Walk only distances you can manage without pain, especially leg pain. How to do the exercises Pelvic tilt 1. Lie on your back with your knees bent. 2. \"Brace\" your stomachtighten your muscles by pulling in and imagining your belly button moving toward your spine. 3. Press your lower back into the floor. You should feel your hips and pelvis rock back. 4. Hold for 6 seconds while breathing smoothly. 5. Relax and allow your pelvis and hips to rock forward. 6. Repeat 8 to 12 times. Back stretches 1. Get down on your hands and knees on the floor. 2. Relax your head and allow it to droop.  Round your back up toward the ceiling until you feel a nice stretch in your upper, middle, and lower back. Hold this stretch for as long as it feels comfortable, or about 15 to 30 seconds. 3. Return to the starting position with a flat back while you are on your hands and knees. 4. Let your back sway by pressing your stomach toward the floor. Lift your buttocks toward the ceiling. 5. Hold this position for 15 to 30 seconds. 6. Repeat 2 to 4 times. Follow-up care is a key part of your treatment and safety. Be sure to make and go to all appointments, and call your doctor if you are having problems. It's also a good idea to know your test results and keep a list of the medicines you take. Where can you learn more? Go to http://fernando-chriss.info/. Enter O891 in the search box to learn more about \"Low Back Arthritis: Exercises. \" Current as of: March 21, 2017 Content Version: 11.3 © 3328-2146 [employer]. Care instructions adapted under license by [employer] (which disclaims liability or warranty for this information). If you have questions about a medical condition or this instruction, always ask your healthcare professional. [place] [address] any warranty or liability for your use of this information. Neck Arthritis: Exercises Your Care Instructions Here are some examples of typical rehabilitation exercises for your condition. Start each exercise slowly. Ease off the exercise if you start to have pain. Your doctor or physical therapist will tell you when you can start these exercises and which ones will work best for you. How to do the exercises Neck stretches to the side 7. This stretch works best if you keep your shoulder down as you lean away from it. To help you remember to do this, start by relaxing your shoulders and lightly holding on to your thighs or your chair. 8. Tilt your head toward your shoulder and hold for 15 to 30 seconds.  Let the weight of your head stretch your muscles. 9. Repeat 2 to 4 times toward each shoulder. Chin tuck 7. Lie on the floor with a rolled-up towel under your neck. Your head should be touching the floor. 8. Slowly bring your chin toward your chest. 
9. Hold for a count of 6, and then relax for up to 10 seconds. 10. Repeat 8 to 12 times. Active cervical rotation 1. Sit in a firm chair, or stand up straight. 2. Keeping your chin level, turn your head to the right, and hold for 15 to 30 seconds. 3. Turn your head to the left and hold for 15 to 30 seconds. 4. Repeat 2 to 4 times to each side. Shoulder blade squeeze 1. While standing, squeeze your shoulder blades together. 2. Do not raise your shoulders up as you are squeezing. 3. Hold for 6 seconds. 4. Repeat 8 to 12 times. Shoulder rolls 1. Sit comfortably with your feet shoulder-width apart. You can also do this exercise standing up. 2. Roll your shoulders up, then back, and then down in a smooth, circular motion. 3. Repeat 2 to 4 times. Follow-up care is a key part of your treatment and safety. Be sure to make and go to all appointments, and call your doctor if you are having problems. It's also a good idea to know your test results and keep a list of the medicines you take. Where can you learn more? Go to http://fernando-chriss.info/. Enter M181 in the search box to learn more about \"Neck Arthritis: Exercises. \" Current as of: March 21, 2017 Content Version: 11.3 © 1552-1489 [employer]. Care instructions adapted under license by [employer] (which disclaims liability or warranty for this information). If you have questions about a medical condition or this instruction, always ask your healthcare professional. [place] [address] any warranty or liability for your use of this information. Introducing [facility]! Suellen Rojas introduces [employer] patient portal. Now you can access parts of your medical record, email your doctor's office, and request medication refills online. 1. In your internet browser, go to [email or web address] 2. Click on the First Time User? Click Here link in the Sign In box. You will see the New Member Sign Up page. 3. Enter your [employer] Access Code exactly as it appears below. You will not need to use this code after youve completed the sign-up process. If you do not sign up before the expiration date, you must request a new code. · [employer] Access Code: [email or web address] Expires: 9/21/2017  1:52 PM 
 
4. Enter the last four digits of your Social Security Number (xxxx) and Date of Birth (mm/dd/yyyy) as indicated and click Submit. You will be taken to the next sign-up page. 5. Create a [employer] ID. This will be your [employer] login ID and cannot be changed, so think of one that is secure and easy to remember. 6. Create a [employer] password. You can change your password at any time. 7. Enter your Password Reset Question and Answer. This can be used at a later time if you forget your password. 8. Enter your e-mail address. You will receive e-mail notification when new information is available in [address] 9. Click Sign Up. You can now view and download portions of your medical record. 10. Click the Download Summary menu link to download a portable copy of your medical information. If you have questions, please visit the Frequently Asked Questions section of the [employer] website. Remember, [employer] is NOT to be used for urgent needs. For medical emergencies, dial 911. Now available from your iPhone and Android! Please provide this summary of care documentation to your next provider. Your primary care clinician is listed as Billy Aguiar. If you have any questions after today's visit, please call [phone].

## 2017-08-15 ENCOUNTER — DOCUMENTATION ONLY (OUTPATIENT)
Dept: ORTHOPEDIC SURGERY | Age: 47
End: 2017-08-15

## 2017-08-18 ENCOUNTER — TELEPHONE (OUTPATIENT)
Dept: ORTHOPEDIC SURGERY | Age: 47
End: 2017-08-18

## 2017-08-18 ENCOUNTER — HOSPITAL ENCOUNTER (OUTPATIENT)
Dept: MRI IMAGING | Age: 47
Discharge: HOME | End: 2017-08-18
Attending: PHYSICAL MEDICINE & REHABILITATION
Payer: COMMERCIAL

## 2017-08-18 DIAGNOSIS — M50.30 DDD (DEGENERATIVE DISC DISEASE), CERVICAL: ICD-10-CM

## 2017-08-18 DIAGNOSIS — R20.0 LEFT ARM NUMBNESS: ICD-10-CM

## 2017-08-18 DIAGNOSIS — M79.18 MYOFASCIAL PAIN: ICD-10-CM

## 2017-08-18 DIAGNOSIS — M79.2 NEURITIS: ICD-10-CM

## 2017-08-18 PROCEDURE — 72141 MRI NECK SPINE W/O DYE: CPT

## 2017-08-18 NOTE — TELEPHONE ENCOUNTER
Called and spoke to patient in regards to her message and she states she is taking the medication and she is in a lot of pain and she would like to move her appointment up sooner rather than later. I put the patient on hold and when I went back to transfer her and she had hung up. Patient has an appointment on 8/31/17 with Dr. Ervin Reyes and she would like a sooner appointment. If patient calls back please make a sooner appointment if possible. Thank you.

## 2017-08-18 NOTE — TELEPHONE ENCOUNTER
Looks like pt has neck and back pain. Dr. Jeferson Brown ordered a Cervical MRI at her last OV. Dr. Jeferson Brown does mention anything in her note about injections. If it is a steroid injection for her neck, Dr. Jeferson Brown does perform these and she will be referred out. If it's for her back these injections are scheduled at the hospital under CT guidance. So, no unless she is talking about a TP or Toradol injection she will not be receiving any injections on that day. Dr. Jeferson Brown is concerned about some weakness the pt is having LUE weakness. I'm thinking that Dr. Jeferson Brown doesn't wants to know what is going in her neck first before considering injections.  It will depend on her MRI results on what Dr. Jeferson Brown recommends

## 2017-08-18 NOTE — TELEPHONE ENCOUNTER
Patient called in requesting a call back from a nurse in regards to a shot her and Dr. Diego Leung spoke about. Pt wants to make sure she will be able to get it at her appointment. Please advise pt at [phone].

## 2017-08-21 ENCOUNTER — OFFICE VISIT (OUTPATIENT)
Dept: ORTHOPEDIC SURGERY | Age: 47
End: 2017-08-21

## 2017-08-21 ENCOUNTER — TELEPHONE (OUTPATIENT)
Dept: ORTHOPEDIC SURGERY | Age: 47
End: 2017-08-21

## 2017-08-21 VITALS
BODY MASS INDEX: 25.85 KG/M2 | TEMPERATURE: 98.1 F | SYSTOLIC BLOOD PRESSURE: 140 MMHG | HEIGHT: 64 IN | DIASTOLIC BLOOD PRESSURE: 91 MMHG | RESPIRATION RATE: 15 BRPM | WEIGHT: 151.4 LBS | HEART RATE: 97 BPM

## 2017-08-21 DIAGNOSIS — M51.36 BULGING LUMBAR DISC: ICD-10-CM

## 2017-08-21 DIAGNOSIS — M47.816 LUMBAR FACET ARTHROPATHY: ICD-10-CM

## 2017-08-21 DIAGNOSIS — M70.62 TROCHANTERIC BURSITIS, LEFT HIP: ICD-10-CM

## 2017-08-21 DIAGNOSIS — M79.2 NEURITIS: Primary | ICD-10-CM

## 2017-08-21 DIAGNOSIS — M47.812 CERVICAL FACET JOINT SYNDROME: ICD-10-CM

## 2017-08-21 DIAGNOSIS — M62.838 MUSCLE SPASM: ICD-10-CM

## 2017-08-21 RX ORDER — BETAMETHASONE SODIUM PHOSPHATE AND BETAMETHASONE ACETATE 3; 3 MG/ML; MG/ML
6 INJECTION, SUSPENSION INTRA-ARTICULAR; INTRALESIONAL; INTRAMUSCULAR; SOFT TISSUE ONCE
Qty: 1 ML | Refills: 0
Start: 2017-08-21 | End: 2017-08-21

## 2017-08-21 RX ORDER — LIDOCAINE HYDROCHLORIDE 20 MG/ML
5 INJECTION, SOLUTION EPIDURAL; INFILTRATION; INTRACAUDAL; PERINEURAL ONCE
Qty: 5 ML | Refills: 0
Start: 2017-08-21 | End: 2017-08-21

## 2017-08-21 RX ORDER — PREDNISONE 10 MG/1
TABLET ORAL
Qty: 32 TAB | Refills: 0 | Status: ON HOLD | OUTPATIENT
Start: 2017-08-21 | End: 2017-09-27

## 2017-08-21 NOTE — MR AVS SNAPSHOT
Visit Information Date & Time Provider Department Dept. Phone Encounter #  
 8/21/2017  2:50 PM Leilani Cope MD [facility] [phone] [id number] Follow-up Instructions Return in about 1 month (around 9/21/2017) for Medication follow up, injection follow up. Your Appointments 8/31/2017  2:30 PM  
DIAG TEST F/U with Leilani Cope MD  
[facility] - [facility] Appt Note: MRI CERV SPINE F/U  
 [address] [facility] [address] [address]  
  
    
 10/23/2017  9:30 AM  
Follow Up with Leilani Cope MD  
[facility]) Appt Note: 3WK BLOCK FU; Bin Strange 8/30/17  
 [address] [facility] [address]  
[phone]  
  
   
 [address] [facility] [address] [facility] [address] Upcoming Health Maintenance Date Due Pneumococcal 19-64 Medium Risk (1 of 1 - PPSV23) 11/10/1988 DTaP/Tdap/Td series (1 - Tdap) 11/10/1990 PAP AKA CERVICAL CYTOLOGY 11/10/1990 INFLUENZA AGE 9 TO ADULT 8/1/2017 Allergies as of 8/21/2017  Review Complete On: 8/21/2017 By: Johanne Landaverde Severity Noted Reaction Type Reactions Percocet [Oxycodone-acetaminophen] Medium 01/16/2013   Side Effect Nausea and Vomiting Advair Diskus [Fluticasone-salmeterol]  10/07/2015    Nausea and Vomiting Ibuprofen  11/14/2013    Nausea and Vomiting Current Immunizations  Never Reviewed No immunizations on file. Not reviewed this visit You Were Diagnosed With   
  
 Codes Comments Neuritis    -  Primary ICD-10-CM: M79.2 ICD-9-CM: 729.2 Lumbar facet arthropathy     ICD-10-CM: M12.88 ICD-9-CM: 721.3 Trochanteric bursitis, left hip     ICD-10-CM: M70.62 ICD-9-CM: 726.5 Cervical facet joint syndrome     ICD-10-CM: M12.88 ICD-9-CM: 723.8 Bulging lumbar disc     ICD-10-CM: M51.26 
ICD-9-CM: 722.10 Muscle spasm     ICD-10-CM: F08.760 ICD-9-CM: 728.85 Vitals BP Pulse Temp Resp Height(growth percentile) Weight(growth percentile) (!) 140/91 97 98.1 °F (36.7 °C) 15 5' 4\" (1.626 m) 151 lb 6.4 oz (68.7 kg) LMP BMI OB Status Smoking Status 01/01/2013 25.99 kg/m2 Hysterectomy Current Every Day Smoker BMI and BSA Data Body Mass Index Body Surface Area  
 25.99 kg/m 2 1.76 m 2 Preferred Pharmacy [facility] Name Phone [id number] [address] [place] [address]. [phone] Your Updated Medication List  
  
   
This list is accurate as of: 8/21/17  5:01 PM.  Always use your most recent med list.  
  
  
  
  
 albuterol 90 mcg/actuation inhaler Commonly known as:  PROVENTIL HFA, VENTOLIN HFA, PROAIR HFA Take 2 Puffs by inhalation every four (4) hours as needed for Wheezing. ALPRAZolam 0.5 mg tablet Commonly known as:  Eliezer Whitakerks Take 1 Tab by mouth nightly as needed for Anxiety. betamethasone 6 mg/mL injection Commonly known as:  CELESTONE SOLUSPAN  
1 mL by IntraBURSal route once for 1 dose. carisoprodol 250 mg tablet Commonly known as:  SOMA Take 250 mg by mouth as needed for Muscle Spasm(s). celecoxib 100 mg capsule Commonly known as:  CELEBREX Take  by mouth two (2) times a day. dicyclomine 10 mg capsule Commonly known as:  BENTYL  
take 1 capsule by mouth three times a day  
  
 famotidine 40 mg tablet Commonly known as:  PEPCID Take 40 mg by mouth daily. gabapentin 300 mg capsule Commonly known as:  NEURONTIN Take 1 in the evening for 1 week then increase to 2  Indications: NEUROPATHIC PAIN  
  
 HYDROmorphone 2 mg tablet Commonly known as:  DILAUDID Take 1 - 2 tablets every 4 -6 hours as needed for pain  
  
 lidocaine (PF) 20 mg/mL (2 %) injection Commonly known as:  XYLOCAINE  
 5 mL by Other route once for 1 dose. losartan 100 mg tablet Commonly known as:  COZAAR Take 100 mg by mouth daily. meclizine 25 mg tablet Commonly known as:  ANTIVERT Take  by mouth three (3) times daily as needed. NORVASC 5 mg tablet Generic drug:  amLODIPine Take 5 mg by mouth daily. predniSONE 10 mg tablet Commonly known as:  DELTASONE  
6 pills Day 1, 5 pills Day 2, 4 pills Day 3&4, 3 pills Day 5&6, 2 pills Day 7&8, 1 pill Day 9&10, 1/2 pill Day 11&12 SYMBICORT 160-4.5 mcg/actuation HFA inhaler Generic drug:  budesonide-formoterol  
inhale 2 puffs by mouth twice a day IN THE MORNING AND EVENING  
  
 topiramate 25 mg tablet Commonly known as:  TOPAMAX Take 1 in the evening for 1 week, then increase to 2 the second week and continue with 3 in the evening VITAMIN D3 1,000 unit tablet Generic drug:  cholecalciferol Take 3,000 Units by mouth daily. ZyrTEC 10 mg tablet Generic drug:  cetirizine Take  by mouth daily as needed. Prescriptions Sent to Pharmacy Refills  
 predniSONE (DELTASONE) 10 mg tablet 0 Si pills Day 1, 5 pills Day 2, 4 pills Day 3&4, 3 pills Day 5&6, 2 pills Day 7&8, 1 pill Day 9&10, 1/2 pill Day 11&12 Class: Normal  
 [facility]: [id number] [address] [place] [address].  #: [phone] We Performed the Following BETAMETHASONE ACETATE & SODIUM PHOSPHATE INJECTION 3 MG EA. [ [facility]] DRAIN/INJECT LARGE JOINT/BURSA [id number] CPT(R)] LIDOCAINE INJECTION [ [facility]] SCHEDULE SURGERY [[id number] Custom] Follow-up Instructions Return in about 1 month (around 2017) for Medication follow up, injection follow up. Patient Instructions Learning About Medial Branch Block and Neurotomy What are medial branch block and neurotomy? Facet joints connect your vertebrae to each other.  Problems in these joints can cause chronic (long-term) pain in the neck or back. They can sometimes affect the shoulders, arms, buttocks, or legs. Medial branch nerves are the nerves that carry many of the pain messages from your facet joints. Radiofrequency medial branch neurotomy is a type of medial branch neurotomy that is used to relieve arthritis pain. It uses radio waves to damage nerves in your neck or back so that they can no longer send pain messages to your brain. Before your doctor knows if a neurotomy will help you, he or she will do a medial branch block to find out if certain nerves are the ones that are a source of your pain. You will need two separate visits to the outpatient center or hospital to have both procedures. How is a medial branch block done? The doctor will use a tiny needle to numb the skin where you will get the block. Then he or she puts the block needle into the numbed area. You may feel some pressure, but you should not feel pain. Using fluoroscopy (live X-ray) to guide the needle, the doctor injects medicine onto one or more nerves to make them numb. If you get relief from your pain in the next 4 to 6 hours, it's a sign that those nerves may be contributing to your pain. The relief will last only a short time. You may then have a medial branch neurotomy at a later visit to try to get longer relief. It takes 20 to 30 minutes to get the block. You can go home after the doctor watches you for about an hour. You will get instructions on how to report how much pain you have when you are at home. You will need someone to drive you home. How is medial branch neurotomy done? The doctor will use a tiny needle to numb the skin where you will get the neurotomy. Then he or she puts the neurotomy needle into the numbed area. You may feel some pressure.  Using fluoroscopy (live X-ray) to guide the needle, the doctor sends radio waves through the needle to the nerve for 60 to 90 seconds. The radio waves heat the nerve, which damages it. The doctor may do this several times. And he or she may treat more than one nerve. It takes 45 to 90 minutes to get a neurotomy, depending on how many nerves are heated. You will probably go home 30 to 60 minutes later. You will need someone to drive you home. What can you expect after a neurotomy? You may feel a little sore or tender at the injection site at first. But after a successful neurotomy, most people have pain relief right away. It often lasts for 9 to 12 months or longer. Sometimes the pain relief is permanent. If your pain does come back, it may mean that the damaged nerve has healed and can send pain messages again. Or it can mean that a different nerve is causing pain. Your doctor will discuss your options with you. Follow-up care is a key part of your treatment and safety. Be sure to make and go to all appointments, and call your doctor if you are having problems. It's also a good idea to know your test results and keep a list of the medicines you take. Where can you learn more? Go to http://fernando-chriss.info/. Enter L012 in the search box to learn more about \"Learning About Medial Branch Block and Neurotomy. \" Current as of: October 14, 2016 Content Version: 11.3 © 7239-4957 Click [employer] Me Now. Care instructions adapted under license by [employer] (which disclaims liability or warranty for this information). If you have questions about a medical condition or this instruction, always ask your healthcare professional. [address] any warranty or liability for your use of this information. Neck Arthritis: Exercises Your Care Instructions Here are some examples of typical rehabilitation exercises for your condition. Start each exercise slowly. Ease off the exercise if you start to have pain. Your doctor or physical therapist will tell you when you can start these exercises and which ones will work best for you. How to do the exercises Neck stretches to the side 1. This stretch works best if you keep your shoulder down as you lean away from it. To help you remember to do this, start by relaxing your shoulders and lightly holding on to your thighs or your chair. 2. Tilt your head toward your shoulder and hold for 15 to 30 seconds. Let the weight of your head stretch your muscles. 3. Repeat 2 to 4 times toward each shoulder. Chin tuck 1. Lie on the floor with a rolled-up towel under your neck. Your head should be touching the floor. 2. Slowly bring your chin toward your chest. 
3. Hold for a count of 6, and then relax for up to 10 seconds. 4. Repeat 8 to 12 times. Active cervical rotation 1. Sit in a firm chair, or stand up straight. 2. Keeping your chin level, turn your head to the right, and hold for 15 to 30 seconds. 3. Turn your head to the left and hold for 15 to 30 seconds. 4. Repeat 2 to 4 times to each side. Shoulder blade squeeze 1. While standing, squeeze your shoulder blades together. 2. Do not raise your shoulders up as you are squeezing. 3. Hold for 6 seconds. 4. Repeat 8 to 12 times. Shoulder rolls 1. Sit comfortably with your feet shoulder-width apart. You can also do this exercise standing up. 2. Roll your shoulders up, then back, and then down in a smooth, circular motion. 3. Repeat 2 to 4 times. Follow-up care is a key part of your treatment and safety. Be sure to make and go to all appointments, and call your doctor if you are having problems. It's also a good idea to know your test results and keep a list of the medicines you take. Where can you learn more? Go to http://fernando-chriss.info/. Enter E704 in the search box to learn more about \"Neck Arthritis: Exercises. \" Current as of: March 21, 2017 Content Version: 11.3 © 1697-2627 [employer]. Care instructions adapted under license by [employer] (which disclaims liability or warranty for this information). If you have questions about a medical condition or this instruction, always ask your healthcare professional. [address] any warranty or liability for your use of this information. Introducing [facility]! Willadean Saint introduces [employer] patient portal. Now you can access parts of your medical record, email your doctor's office, and request medication refills online. 1. In your internet browser, go to [email or web address] 2. Click on the First Time User? Click Here link in the Sign In box. You will see the New Member Sign Up page. 3. Enter your [employer] Access Code exactly as it appears below. You will not need to use this code after youve completed the sign-up process. If you do not sign up before the expiration date, you must request a new code. · [employer] Access Code: [email or web address] Expires: 9/21/2017  1:52 PM 
 
4. Enter the last four digits of your Social Security Number (xxxx) and Date of Birth (mm/dd/yyyy) as indicated and click Submit. You will be taken to the next sign-up page. 5. Create a MarketBridge ID. This will be your [employer] login ID and cannot be changed, so think of one that is secure and easy to remember. 6. Create a [employer] password. You can change your password at any time. 7. Enter your Password Reset Question and Answer. This can be used at a later time if you forget your password. 8. Enter your e-mail address. You will receive e-mail notification when new information is available in [address] 9. Click Sign Up. You can now view and download portions of your medical record. 10. Click the Download Summary menu link to download a portable copy of your medical information.  
 
If you have questions, please visit the Frequently Asked Questions section of the Reddit. Remember, [facility] is NOT to be used for urgent needs. For medical emergencies, dial 911. Now available from your iPhone and Android! Please provide this summary of care documentation to your next provider. Your primary care clinician is listed as Elizabeth Ramos. If you have any questions after today's visit, please call [phone].

## 2017-08-21 NOTE — LETTER
[facility]uling - Fely Covert Fax to [phone]  Telephone: [phone] To be completed by Physician Office: 
 
Date: 2017    Requested by: Tona Mehta Phone No: [phone]   Fax No:  [phone] Surgery Date: 2017  Arrival Time: 12:45   Injection Time: 2:15 Provider: Juan Ward     
 
CPT CODE*  Procedure 77415 SNRB LEFT L4  
   
   
*CPT code is required for ALL procedures. Patient Information: 
 
Name: Obed Nice SSN: [id number]  : 1970   Male/Female: female Home Phone No: [phone] (home) Primary Insurance: [employer] Number: [id number] NO AUTH REQUIRED PER DEDICATED PRE-CERT CASE TEAM REP Salma Montana [email or web address]@[employer] [id number] Secondary Insurance: [facility]    POLICY#: [id number]  NO PRECERT REQUIRED FOR OUTPATIENT SERVICES PER REP [address] [facility] 29 [phone] EXT  ICD10 code(s): M79.2; M51.26   Diagnosis:  NEURITIS; BULGING LUMBAR DISC Diabetic/finger stick to be done BS level must be <200 mg/dl Anesthesia Type Local: Valium 10 mg PO 30 minutes prior to procedure

## 2017-08-21 NOTE — PROGRESS NOTES
[facility]  [address] [place], [facility] [address]  Phone: [phone]  Fax: [phone]      ASSESSMENT   Diagnoses and all orders for this visit:    1. Neuritis  -     SCHEDULE SURGERY    2. Lumbar facet arthropathy  -     predniSONE (DELTASONE) 10 mg tablet; 6 pills Day 1, 5 pills Day 2, 4 pills Day 3&4, 3 pills Day 5&6, 2 pills Day 7&8, 1 pill Day 9&10, 1/2 pill Day 11&12    3. Trochanteric bursitis, left hip  -     predniSONE (DELTASONE) 10 mg tablet; 6 pills Day 1, 5 pills Day 2, 4 pills Day 3&4, 3 pills Day 5&6, 2 pills Day 7&8, 1 pill Day 9&10, 1/2 pill Day 11&12  -     LIDOCAINE INJECTION  -     lidocaine, PF, (XYLOCAINE) 20 mg/mL (2 %) injection; 5 mL by Other route once for 1 dose. -     betamethasone (CELESTONE SOLUSPAN) 6 mg/mL injection; 1 mL by IntraBURSal route once for 1 dose.  -     BETAMETHASONE ACETATE & SODIUM PHOSPHATE INJECTION 6 MG  -     DRAIN/INJECT LARGE JOINT/BURSA (20610)    4. Cervical facet joint syndrome  -     predniSONE (DELTASONE) 10 mg tablet; 6 pills Day 1, 5 pills Day 2, 4 pills Day 3&4, 3 pills Day 5&6, 2 pills Day 7&8, 1 pill Day 9&10, 1/2 pill Day 11&12    5. Bulging lumbar disc  -     SCHEDULE SURGERY    6. Muscle spasm         IMPRESSION AND PLAN:  Alma Vera is a 51 y.o. female with history of cervical and lumbar pain. She c/o left sided neck pain that radiates to the left shoulder. Pt also c/o left leg pain that is so severe it interferes with her sleep. She wishes to try steroid injections. 1) Pt was given information on cervical arthritis exercises. 2) Discussed treatment options with the Pt, including steroid injections and a RFA. 3) Pt was given information on radiofrequency ablation. 4) She was scheduled for a left L4 SNRB. 5) Pt received a left greater trochanteric bursa injection in the office today. 6) I recommended the Pt try ice on her left hip.   7) Pt was prescribed a large prednisone taper. 8) Ms. Dominga Mendoza has a reminder for a \"due or due soon\" health maintenance. I have asked that she contact her primary care provider, Chaparrita Benavides MD, for follow-up on this health maintenance. 9)  demonstrated consistency with prescribing. 10) Pt will follow-up in 1 month. HISTORY OF PRESENT ILLNESS:  Zee Moon is a 51 y.o. female with history of cervical and lumbar pain. Pt presents to the office today for cervical MRI follow up. She c/o left sided neck pain that radiates to the left shoulder. Pt reports that her left leg pain is so severe at this time she has difficulty sleeping at night. She admits to right sided symptoms but this is not as severe at this time. Pt states that she feels as if her lateral left thigh swells at times. She works at that [employer] at [employer] and states that she was almost unable to work last week since she was shaking due to pain. Pt wishes to try a steroid injection. She reports minimal relief when trying Toradol in the past. She tried a Medrol Dosepak about 1 month ago. Pt at this time desires to proceed with steroid injections and medication evaluation. Pain Scale: 8/10    PCP: Chaparrita Benavides MD       Past Medical History:   Diagnosis Date    Anxiety     Arthritis     Asthma     pt states she does not have asthma    Chronic pain     Chronic Back Pain     Depression     Gastrointestinal disorder     GERD (gastroesophageal reflux disease)     well controlled w/ meds    Hypertension     no medications currently; off [id number]    IBS (irritable bowel syndrome)     Other ill-defined conditions     bulging disc    Other ill-defined conditions     sinusitis    Tattoo     TATTOO        Social History     Social History    Marital status:      Spouse name: N/A    Number of children: N/A    Years of education: N/A     Occupational History    Not on file.      Social History Main Topics    Smoking status: Current Every Day Smoker     Packs/day: 0.00 Last attempt to quit: 10/1/2010    Smokeless tobacco: Never Used    Alcohol use No    Drug use: No    Sexual activity: Not on file     Other Topics Concern    Not on file     Social History Narrative       Current Outpatient Prescriptions   Medication Sig Dispense Refill    predniSONE (DELTASONE) 10 mg tablet 6 pills Day 1, 5 pills Day 2, 4 pills Day 3&4, 3 pills Day 5&6, 2 pills Day 7&8, 1 pill Day 9&10, 1/2 pill Day 11&12 32 Tab 0    SYMBICORT 160-4.5 mcg/actuation HFA inhaler inhale 2 puffs by mouth twice a day IN THE MORNING AND EVENING  0    dicyclomine (BENTYL) 10 mg capsule take 1 capsule by mouth three times a day  0    topiramate (TOPAMAX) 25 mg tablet Take 1 in the evening for 1 week, then increase to 2 the second week and continue with 3 in the evening 90 Tab 1    gabapentin (NEURONTIN) 300 mg capsule Take 1 in the evening for 1 week then increase to 2  Indications: NEUROPATHIC PAIN 60 Cap 1    albuterol (PROVENTIL HFA, VENTOLIN HFA, PROAIR HFA) 90 mcg/actuation inhaler Take 2 Puffs by inhalation every four (4) hours as needed for Wheezing. 1 Inhaler 0    HYDROmorphone (DILAUDID) 2 mg tablet Take 1 - 2 tablets every 4 -6 hours as needed for pain 40 Tab 0    carisoprodol (SOMA) 250 mg tablet Take 250 mg by mouth as needed for Muscle Spasm(s).  meclizine (ANTIVERT) 25 mg tablet Take  by mouth three (3) times daily as needed.  famotidine (PEPCID) 40 mg tablet Take 40 mg by mouth daily.  celecoxib (CELEBREX) 100 mg capsule Take  by mouth two (2) times a day.  losartan (COZAAR) 100 mg tablet Take 100 mg by mouth daily.  amLODIPine (NORVASC) 5 mg tablet Take 5 mg by mouth daily.  ALPRAZolam (XANAX) 0.5 mg tablet Take 1 Tab by mouth nightly as needed for Anxiety. 7 Tab 0    cholecalciferol (VITAMIN D3) 1,000 unit tablet Take 3,000 Units by mouth daily.  cetirizine (ZYRTEC) 10 mg tablet Take  by mouth daily as needed.          Allergies   Allergen Reactions    Percocet [Oxycodone-Acetaminophen] Nausea and Vomiting    Advair Diskus [Fluticasone-Salmeterol] Nausea and Vomiting    Ibuprofen Nausea and Vomiting         REVIEW OF SYSTEMS    Constitutional: Negative for fever, chills, or weight change. Respiratory: Negative for cough or shortness of breath. Cardiovascular: Negative for chest pain or palpitations. Gastrointestinal: Negative for acid reflux, change in bowel habits, or constipation. Genitourinary: Negative for dysuria and flank pain. Musculoskeletal: Positive for cervical and lumbar pain. Skin: Negative for rash. Neurological: Negative for headaches, dizziness, or numbness. Endo/Heme/Allergies: Negative for increased bruising. Psychiatric/Behavioral: Positive for difficulty with sleep. PHYSICAL EXAMINATION  Visit Vitals    BP (!) 140/91    Pulse 97    Temp 98.1 °F (36.7 °C)    Resp 15    Ht 5' 4\" (1.626 m)    Wt 151 lb 6.4 oz (68.7 kg)    LMP 01/01/2013    BMI 25.99 kg/m2       Constitutional: Awake, alert, and in no acute distress  Neurological: 1+ symmetrical DTRs in the upper extremities. 1+ symmetrical DTRs in the lower extremities. Sensation to light touch is intact. Negative Horacio's sign bilaterally. Skin: warm, dry, and intact. Musculoskeletal: Tenderness to palpation in the lower lumbar region. Tenderness to palpation over the left greater trochanter. Pain with extension, axial loading, and forward flexion. No pain with internal or external rotation of her hips. Positive straight leg raise bilaterally. Biceps  Triceps Deltoids Wrist Ext Wrist Flex Hand Intrin   Right +4/5 +4/5 +4/5 +4/5 +4/5 +4/5   Left +4/5 +4/5 +4/5 +4/5 +4/5 +4/5      Hip Flex  Quads Hamstrings Ankle DF EHL Ankle PF   Right +4/5 +4/5 +4/5 +4/5 +4/5 +4/5   Left +4/5 +4/5 +4/5 +4/5 +4/5 +4/5     IMAGING:    PROCEDURES:    Patient's left greater trochanter bursa was injected with 5 cc Lidocaine and 1 cc Celestone. Pre injection pain level was 7/10. Post injection pain level was 7/10. Chart reviewed for the following:   Andrea Maddox MD, have reviewed the History, Physical and updated the Allergic reactions for Carmelina Hong. TIME OUT performed immediately prior to start of procedure:  Blair LANCASTER MD, have performed the following reviews on [address] [facility] prior to the start of the procedure:            * Patient was identified by name and date of birth   * Agreement on procedure being performed was verified  * Risks and Benefits explained to the patient  * Procedure site verified and marked as necessary  * Patient was positioned for comfort  * Consent was obtained     Time: 4:02 PM     Date of procedure: 8/22/2017    Procedure performed by: Ragena Mcardle, MD    Ms. Hong tolerated the procedure well with no complications. Cervical spine MRI from 08/18/2017 was personally reviewed with the Pt and demonstrated:    Results from Hospital Encounter encounter on 08/18/17   MRI CERV SPINE WO CONT   Narrative Cervical spine MR:    Indication: Neck pain and left arm/shoulder radicular symptoms. Procedure: Sagittal spin echo T1 and T2 FSE images of the cervical spine. Axial  FSE T2 and  T2 star volume gradient echo thin section scans were also performed. In addition, a sagittal FSE inversion recovery (STIR) scan was also performed,  the most sensitive study for vertebral and cord lesions. Findings: The cervical spine is normally aligned. No vertebral lesions. No cord  lesion. The craniocervical junction is normal.    Axial scans show:    C2/C3: Right greater than left facet arthropathy. No prominent facet effusion. Mild-moderate right-sided foraminal narrowing. No significant central or  left-sided foraminal stenosis. C3/C4: Mild spondylosis/bulging disc slightly more prominent on the right. No  significant central or foraminal stenosis. C4/C5: Normal except for mild degenerative change.  Moderately prominent  left-sided facet arthropathy. There is marrow edema. No large joint effusion. Mild paraspinous edema is also noted on parasagittal STIR imaging at this level. C5/C6: Normal except for mild degenerative change. No significant stenosis. Minimal left paracentral disc protrusion. C6/C7: Normal except for mild degenerative change with mild spondylosis but no  high-grade stenosis. C7/T1: This level is normal.         Impression Impression:  1. Degenerative changes as described above. No high-grade central or foraminal  stenosis. 2. Focally prominent facet arthropathy on the left at C4-C5. There is marrow  edema and some adjacent soft tissue edema. Clinical correlation is recommended  as to whether or not the patient's left arm and shoulder symptoms might be  referred pain from this facet joint characterized by likely inflammatory  osteoarthritis. Lumbar spine MRI from 07/21/2017 was personally reviewed with the Pt and demonstrated:  Results from Hospital Encounter on 07/21/17   MRI LUMB SPINE WO CONT    Narrative MRI Lumbar spine    History: increase new left radicular symptoms, not able to walk more than 10-15  mins, bilateral leg pain greater on the left    Comparison: MR lumbar spine 12/12/2013    Technique: Multiplanar multi sequence MR imaging of the lumbar spine was  performed utilizing sagittal T1, T2, STIR, and axial T2 and T1 sequences. Findings:     Normal lumbar spine vertebral alignment is present without subluxation. Osseous  marrow signal is within normal limits. A pars defect is not seen. The conus  medullaris is located at the inferior T12 level and has a normal appearance and  signal.     T11-12 level: Axial imaging not performed at this level. Disc bulge is present  with superimposed right central disc protrusion which does produce focal cord  deformity. No abnormal cord signal is seen. No central stenosis is seen.  This  level shows no definite interval change, although axial imaging not performed on  either study. T12-L1 level: No axial imaging performed at this level. There is no central or  foraminal stenosis. L1/2 level: There is no central or foraminal stenosis. L2/3 level: There is no central or foraminal stenosis. L3/4 level: There is no central or foraminal stenosis. L4/5 level: Mild disc bulge is present. There is a superimposed focal left  foraminal disc extrusion which minimally extend superiorly as seen on sagittal  image 3. This disc herniation is slightly increased since prior study. The  exiting left L4 nerve root is contacted without definite mass effect. Very mild  facet arthropathy is seen. No central stenosis is present. L5/S1 level: Disc bulge is present. There is a superimposed right  central/subarticular disc protrusion which does produce mild mass effect on the  descending right S1 nerve root. Mass effect upon the right S1 nerve root has  increased since the prior study. No central stenosis is produced. The visualized retroperitoneum is unremarkable.           Impression IMPRESSION:    1. Right central/subarticular L5-S1 disc protrusion with mild mass effect on  descending right S1 nerve root, increased since prior 2013 study. Clinical  correlation with possible right S1 radiculopathy is recommended. 2. Left foraminal L4-5 focal disc extrusion minimally extending superiorly  contacting exiting left L4 nerve root without definite mass effect. Clinical  correlation with possible left L4 radiculopathy is recommended. 3. Grossly stable T11-T12 right central disc protrusion producing focal cord  deformity but no abnormal cord signal or high-grade stenosis. 4. Other lumbar levels are unremarkable without high-grade central or foraminal  stenosis. Written by Radha Guo, as dictated by Blair Pierre MD.  I, Dr. Blair Pierre confirm that all documentation is accurate.

## 2017-08-21 NOTE — PATIENT INSTRUCTIONS
Learning About Medial Branch Block and Neurotomy  What are medial branch block and neurotomy? Facet joints connect your vertebrae to each other. Problems in these joints can cause chronic (long-term) pain in the neck or back. They can sometimes affect the shoulders, arms, buttocks, or legs. Medial branch nerves are the nerves that carry many of the pain messages from your facet joints. Radiofrequency medial branch neurotomy is a type of medial branch neurotomy that is used to relieve arthritis pain. It uses radio waves to damage nerves in your neck or back so that they can no longer send pain messages to your brain. Before your doctor knows if a neurotomy will help you, he or she will do a medial branch block to find out if certain nerves are the ones that are a source of your pain. You will need two separate visits to the outpatient center or hospital to have both procedures. How is a medial branch block done? The doctor will use a tiny needle to numb the skin where you will get the block. Then he or she puts the block needle into the numbed area. You may feel some pressure, but you should not feel pain. Using fluoroscopy (live X-ray) to guide the needle, the doctor injects medicine onto one or more nerves to make them numb. If you get relief from your pain in the next 4 to 6 hours, it's a sign that those nerves may be contributing to your pain. The relief will last only a short time. You may then have a medial branch neurotomy at a later visit to try to get longer relief. It takes 20 to 30 minutes to get the block. You can go home after the doctor watches you for about an hour. You will get instructions on how to report how much pain you have when you are at home. You will need someone to drive you home. How is medial branch neurotomy done? The doctor will use a tiny needle to numb the skin where you will get the neurotomy. Then he or she puts the neurotomy needle into the numbed area.  You may feel some pressure. Using fluoroscopy (live X-ray) to guide the needle, the doctor sends radio waves through the needle to the nerve for 60 to 90 seconds. The radio waves heat the nerve, which damages it. The doctor may do this several times. And he or she may treat more than one nerve. It takes 45 to 90 minutes to get a neurotomy, depending on how many nerves are heated. You will probably go home 30 to 60 minutes later. You will need someone to drive you home. What can you expect after a neurotomy? You may feel a little sore or tender at the injection site at first. But after a successful neurotomy, most people have pain relief right away. It often lasts for 9 to 12 months or longer. Sometimes the pain relief is permanent. If your pain does come back, it may mean that the damaged nerve has healed and can send pain messages again. Or it can mean that a different nerve is causing pain. Your doctor will discuss your options with you. Follow-up care is a key part of your treatment and safety. Be sure to make and go to all appointments, and call your doctor if you are having problems. It's also a good idea to know your test results and keep a list of the medicines you take. Where can you learn more? Go to http://fernando-chriss.info/. Enter K634 in the search box to learn more about \"Learning About Medial Branch Block and Neurotomy. \"  Current as of: October 14, 2016  Content Version: 11.3  © 5977-0812 [employer]. Care instructions adapted under license by [employer] (which disclaims liability or warranty for this information). If you have questions about a medical condition or this instruction, always ask your healthcare professional. [address] any warranty or liability for your use of this information. Neck Arthritis: Exercises  Your Care Instructions  Here are some examples of typical rehabilitation exercises for your condition.  Start each exercise slowly. Ease off the exercise if you start to have pain. Your doctor or physical therapist will tell you when you can start these exercises and which ones will work best for you. How to do the exercises  Neck stretches to the side    1. This stretch works best if you keep your shoulder down as you lean away from it. To help you remember to do this, start by relaxing your shoulders and lightly holding on to your thighs or your chair. 2. Tilt your head toward your shoulder and hold for 15 to 30 seconds. Let the weight of your head stretch your muscles. 3. Repeat 2 to 4 times toward each shoulder. Chin tuck    1. Lie on the floor with a rolled-up towel under your neck. Your head should be touching the floor. 2. Slowly bring your chin toward your chest.  3. Hold for a count of 6, and then relax for up to 10 seconds. 4. Repeat 8 to 12 times. Active cervical rotation    1. Sit in a firm chair, or stand up straight. 2. Keeping your chin level, turn your head to the right, and hold for 15 to 30 seconds. 3. Turn your head to the left and hold for 15 to 30 seconds. 4. Repeat 2 to 4 times to each side. Shoulder blade squeeze    1. While standing, squeeze your shoulder blades together. 2. Do not raise your shoulders up as you are squeezing. 3. Hold for 6 seconds. 4. Repeat 8 to 12 times. Shoulder rolls    1. Sit comfortably with your feet shoulder-width apart. You can also do this exercise standing up. 2. Roll your shoulders up, then back, and then down in a smooth, circular motion. 3. Repeat 2 to 4 times. Follow-up care is a key part of your treatment and safety. Be sure to make and go to all appointments, and call your doctor if you are having problems. It's also a good idea to know your test results and keep a list of the medicines you take. Where can you learn more? Go to http://fernando-chriss.info/.   Enter B199 in the search box to learn more about \"Neck Arthritis: Exercises. \"  Current as of: March 21, 2017  Content Version: 11.3  © 5036-6981 [employer]. Care instructions adapted under license by [employer] (which disclaims liability or warranty for this information). If you have questions about a medical condition or this instruction, always ask your healthcare professional. [place] [address] any warranty or liability for your use of this information.

## 2017-08-21 NOTE — TELEPHONE ENCOUNTER
PATIENT CALLED TO LET  AND HER NURSE KNOW THAT SHE WOULD LIKE TO GET A CORTISONE INJECTION TODAY DURING HER APPOINTMENT WITH DR. Lisa Garzon. PATIENT APPOINTMENT IS TODAY 08/21/17 AT 2:50P.M. AT THE [facility] LOCATION. PATIENT SAID SHE WOULD LIKE TO KNOW IF SHE CAN GET THE CORTISONE INJECTION TODAY. IF NOT PATIENT SAID THEIR IS NO POINT IN HER COMING TO THE APPOINTMENT. PATIENT IS REQUESTING A CALL BACK. PATIENT TEL. [phone].

## 2017-08-30 ENCOUNTER — HOSPITAL ENCOUNTER (OUTPATIENT)
Age: 47
Setting detail: OUTPATIENT SURGERY
Discharge: HOME | End: 2017-08-30
Attending: PHYSICAL MEDICINE & REHABILITATION | Admitting: PHYSICAL MEDICINE & REHABILITATION
Payer: COMMERCIAL

## 2017-08-30 ENCOUNTER — APPOINTMENT (OUTPATIENT)
Dept: GENERAL RADIOLOGY | Age: 47
End: 2017-08-30
Attending: PHYSICAL MEDICINE & REHABILITATION
Payer: COMMERCIAL

## 2017-08-30 VITALS
WEIGHT: 152 LBS | HEART RATE: 98 BPM | TEMPERATURE: 98 F | RESPIRATION RATE: 16 BRPM | BODY MASS INDEX: 25.95 KG/M2 | HEIGHT: 64 IN | DIASTOLIC BLOOD PRESSURE: 82 MMHG | OXYGEN SATURATION: 97 % | SYSTOLIC BLOOD PRESSURE: 118 MMHG

## 2017-08-30 PROCEDURE — 76010000009 HC PAIN MGT 0 TO 30 MIN PROC: Performed by: PHYSICAL MEDICINE & REHABILITATION

## 2017-08-30 PROCEDURE — 74011636320 HC RX REV CODE- 636/320

## 2017-08-30 PROCEDURE — 74011250636 HC RX REV CODE- 250/636

## 2017-08-30 PROCEDURE — 74011250637 HC RX REV CODE- 250/637: Performed by: PHYSICAL MEDICINE & REHABILITATION

## 2017-08-30 PROCEDURE — 74011000250 HC RX REV CODE- 250

## 2017-08-30 PROCEDURE — 77030003669 HC NDL SPN COOK -B: Performed by: PHYSICAL MEDICINE & REHABILITATION

## 2017-08-30 RX ORDER — DEXAMETHASONE SODIUM PHOSPHATE 100 MG/10ML
INJECTION INTRAMUSCULAR; INTRAVENOUS AS NEEDED
Status: DISCONTINUED | OUTPATIENT
Start: 2017-08-30 | End: 2017-08-30 | Stop reason: HOSPADM

## 2017-08-30 RX ORDER — DIAZEPAM 5 MG/1
5-20 TABLET ORAL ONCE
Status: COMPLETED | OUTPATIENT
Start: 2017-08-30 | End: 2017-08-30

## 2017-08-30 RX ORDER — LIDOCAINE HYDROCHLORIDE 10 MG/ML
INJECTION, SOLUTION EPIDURAL; INFILTRATION; INTRACAUDAL; PERINEURAL AS NEEDED
Status: DISCONTINUED | OUTPATIENT
Start: 2017-08-30 | End: 2017-08-30 | Stop reason: HOSPADM

## 2017-08-30 RX ADMIN — DIAZEPAM 10 MG: 5 TABLET ORAL at 13:23

## 2017-08-30 NOTE — H&P
Date of Surgery Update:  Harvey Reinoso was seen and examined. History and physical has been reviewed. The patient has been examined. There have been no significant clinical changes since the completion of the last office visit.       Signed By: Rocio Randolph MD     August 30, 2017 12:52 PM

## 2017-08-30 NOTE — DISCHARGE INSTRUCTIONS
[facility]   ([facility])  Dr. Paulino Louie, Dr. Mavis Ferguson, Dr. Mariya Cam not drive a car, operate heavy machinery or dangerous equipment for 24 hours. * Activity as tolerated; rest for the remainder of the day. * Resume pre-block medications including those for your family doctor. * Do not drink alcoholic beverages for 24 hours. Alcohol and the medications you have received may interact and cause an adverse reaction. * You may feel better this evening and worse tomorrow, as the numbing medications wears off and the steroid has yet to begin to work. After 48 hrs the steroid should begin to release bringing you relief. * You may shower this evening and remove any bandages. * Avoid hot tubs and heating pads for 24 hours. You may use cold packs on the procedure site as tolerated for the first 24 hours. * If a headache develops, drink plenty of fluids and rest.  Take over the counter medications for headache if needed. If the headache continues longer than 24 hours, call MD at the [address] [place] [address]. [phone]    * Continue taking pain medications as needed. * You may resume your regular diet if tolerated. Otherwise, start with sips of water and advance slowly. * If Diabetic: check your blood sugar three times a day for the next 3 days. If your sugar is greater than 300 call your family doctor. If your sugar is greater than 400, have someone transport you to the nearest Emergency Room. * If you experience any of the following problems, Please Call the [address] [place] [address] at [phone].         * Shortness of Breath    * Fever of 101 or higher    * Nausea / Vomiting    * Severe Headache    * Weakness or numbness in arms or legs that is not      resolving    * Prolonged increase in pain greater than 4 days      DISCHARGE SUMMARY from Nurse      PATIENT INSTRUCTIONS:    After oral sedation, for 24 hours or while taking prescription Narcotics:  · Limit your activities  · Do not drive and operate hazardous machinery  · Do not make important personal or business decisions  · Do  not drink alcoholic beverages  · If you have not urinated within 8 hours after discharge, please contact your surgeon on call. Report the following to your surgeon:  · Excessive pain, swelling, redness or odor of or around the surgical area  · Temperature over 101  · Nausea and vomiting lasting longer than 4 hours or if unable to take medications  · Any signs of decreased circulation or nerve impairment to extremity: change in color, persistent  numbness, tingling, coldness or increase pain  · Any questions            What to do at Home:  Recommended activity: Activity as tolerated, NO DRIVING FOR 12 Hours post injection          *  Please give a list of your current medications to your Primary Care Provider. *  Please update this list whenever your medications are discontinued, doses are      changed, or new medications (including over-the-counter products) are added. *  Please carry medication information at all times in case of emergency situations. These are general instructions for a healthy lifestyle:    No smoking/ No tobacco products/ Avoid exposure to second hand smoke    Surgeon General's Warning:  Quitting smoking now greatly reduces serious risk to your health. Obesity, smoking, and sedentary lifestyle greatly increases your risk for illness    A healthy diet, regular physical exercise & weight monitoring are important for maintaining a healthy lifestyle    You may be retaining fluid if you have a history of heart failure or if you experience any of the following symptoms:  Weight gain of 3 pounds or more overnight or 5 pounds in a week, increased swelling in our hands or feet or shortness of breath while lying flat in bed.   Please call your doctor as soon as you notice any of these symptoms; do not wait until your next office visit. Recognize signs and symptoms of STROKE:    F-face looks uneven    A-arms unable to move or move unevenly    S-speech slurred or non-existent    T-time-call 911 as soon as signs and symptoms begin-DO NOT go       Back to bed or wait to see if you get better-TIME IS BRAIN.

## 2017-08-30 NOTE — PROCEDURES
PROCEDURE NOTE      Patient Name: Carolina Santos    Date of Procedure: August 30, 2017    Preoperative Diagnosis:  Acute neuritis [M79.2]  Bulge of lumbar disc without myelopathy [M51.26]    Post Operative Diagnosis:  Acute neuritis [M79.2]  Bulge of lumbar disc without myelopathy [M51.26]    Procedure :    Bilateral L4 Selective Nerve Root Block    Consent:  Informed consent was obtained prior to the procedure. The patient was given the opportunity to ask questions regarding the procedure and its associated risks. In addition to the potential risks associated with the procedure itself, the patient was informed both verbally and in writing of the potential side effects of the use of glucocorticoid. The patient appeared to comprehend the informed consent and desired to have the procedure performed. Procedure: The patient was placed in the prone position on the fluoroscopy table and the back was prepped and draped in the usual sterile manner. The exact spinal level was  identified using fluoroscopy, and Lidocaine 1 % was injected locally, a # 22 gauge spinal needle was passed to the transverse process. The depth was noted and the needle redirected to pass inferior and approximately one cm anterior to the transverse process. YES  1 cc of Isovue M-200 was used to verify positioning in the epidural and paravertebral space and outlined the course of the spinal nerve into the epidural space. The same procedure was repeated at each spinal level indicated above. A total of 30 mg of preservative free Dexamethasone and 5 cc of Lidocaine was slowly injected. The patient tolerated the procedure well. The injection area was cleaned and bandaids applied. Not excessive bleeding was noted. Patient dressed and discharged to home with instructions. Discussion: The patient tolerated the procedure well.                                               Jean-Claude Mckinney MD  August 30, 2017

## 2017-08-31 ENCOUNTER — PATIENT OUTREACH (OUTPATIENT)
Dept: OTHER | Age: 47
End: 2017-08-31

## 2017-08-31 NOTE — PROGRESS NOTES
First attempt to reach patient to offer [employer] CM. Left discreet voice mail with this CM call back number. Will attempt to reach again.

## 2017-09-15 ENCOUNTER — TELEPHONE (OUTPATIENT)
Dept: ORTHOPEDIC SURGERY | Age: 47
End: 2017-09-15

## 2017-09-15 DIAGNOSIS — M54.16 LUMBAR RADICULOPATHY: ICD-10-CM

## 2017-09-15 DIAGNOSIS — M79.2 NEURITIS: Primary | ICD-10-CM

## 2017-09-15 NOTE — TELEPHONE ENCOUNTER
She will have to be re-evaluated by Dr Alma Arzola. She can call daily for cancellation if she desires to be seen sooner.

## 2017-09-15 NOTE — TELEPHONE ENCOUNTER
PATIENT CALLED FOR DR. Bin Strange. PATIENT SAID SHE HAD HER BLOCK INJECTION ON 08/30/17 AND THAT IT IS NOT WORKING. PATIENT SAID SHE WOULD LIKE TO KNOW WHAT CAN WE DO NOW SINCE THE BLOCK INJECTION DID NOT WORK. PATIENT WOULD LIKE A CALL BACK AT   TEL. [phone].

## 2017-09-18 NOTE — TELEPHONE ENCOUNTER
Patient called back as no one has called her and patient would like to speak with NP .  Please call her at [phone]

## 2017-09-19 ENCOUNTER — PATIENT MESSAGE (OUTPATIENT)
Dept: ORTHOPEDIC SURGERY | Age: 47
End: 2017-09-19

## 2017-09-19 NOTE — TELEPHONE ENCOUNTER
From: Loretta Mac  To: Rupali Hernandez MD  Sent: 9/19/2017 4:10 AM EDT  Subject: Visit Follow-Up Question    I have called an left message on September 15th and also on September 18th with no call back. I had Nerve Block done 8/31/17 with after 2 weeks the pain came back and the last few days have become excruciating pain to where nothing is helping. Please call me back and let me know when i can try this for a second time or at least get a cortisone shot or something for now. Will be heading to ER if can not get a call back soon. Thank you!

## 2017-09-19 NOTE — TELEPHONE ENCOUNTER
Patient called back again I have informed her of Luma Parikh message. She states that she would still like to speak with Dr. Luis Daniel He or a nurse in regards to getting another injection and how long would she have to wait to get it done from the first one. Please contact patient at [phone].

## 2017-09-19 NOTE — TELEPHONE ENCOUNTER
Called and spoke with patient, informed I had responded to her [employer] message. Per patient she does not believe she needs to be seen by Dr. Kevin Ramsey, she would like to know if she could just schedule the next injection. Informed patient, I would speak with Dr. Kevin Ramsey and return her call. Patient verbalized agreement/understanding.

## 2017-09-19 NOTE — TELEPHONE ENCOUNTER
Per patient, she would like to have the block/injection Bilateral, per patient, she is having more symptoms to the right. Order pended for Bilateral L4 SNRB. Please review and sign.

## 2017-09-27 ENCOUNTER — HOSPITAL ENCOUNTER (OUTPATIENT)
Age: 47
Setting detail: OUTPATIENT SURGERY
Discharge: HOME | End: 2017-09-27
Attending: PHYSICAL MEDICINE & REHABILITATION | Admitting: PHYSICAL MEDICINE & REHABILITATION
Payer: COMMERCIAL

## 2017-09-27 ENCOUNTER — APPOINTMENT (OUTPATIENT)
Dept: GENERAL RADIOLOGY | Age: 47
End: 2017-09-27
Attending: PHYSICAL MEDICINE & REHABILITATION
Payer: COMMERCIAL

## 2017-09-27 VITALS
SYSTOLIC BLOOD PRESSURE: 121 MMHG | RESPIRATION RATE: 18 BRPM | HEIGHT: 64 IN | OXYGEN SATURATION: 99 % | DIASTOLIC BLOOD PRESSURE: 77 MMHG | WEIGHT: 162 LBS | TEMPERATURE: 98.4 F | HEART RATE: 97 BPM | BODY MASS INDEX: 27.66 KG/M2

## 2017-09-27 PROCEDURE — 74011250637 HC RX REV CODE- 250/637: Performed by: PHYSICAL MEDICINE & REHABILITATION

## 2017-09-27 PROCEDURE — 77030003676 HC NDL SPN MPRI -A: Performed by: PHYSICAL MEDICINE & REHABILITATION

## 2017-09-27 PROCEDURE — 74011000250 HC RX REV CODE- 250

## 2017-09-27 PROCEDURE — 74011636320 HC RX REV CODE- 636/320

## 2017-09-27 PROCEDURE — 74011250636 HC RX REV CODE- 250/636

## 2017-09-27 PROCEDURE — 77030003669 HC NDL SPN COOK -B: Performed by: PHYSICAL MEDICINE & REHABILITATION

## 2017-09-27 PROCEDURE — 76010000009 HC PAIN MGT 0 TO 30 MIN PROC: Performed by: PHYSICAL MEDICINE & REHABILITATION

## 2017-09-27 RX ORDER — DEXAMETHASONE SODIUM PHOSPHATE 100 MG/10ML
INJECTION INTRAMUSCULAR; INTRAVENOUS AS NEEDED
Status: DISCONTINUED | OUTPATIENT
Start: 2017-09-27 | End: 2017-09-27 | Stop reason: HOSPADM

## 2017-09-27 RX ORDER — LIDOCAINE HYDROCHLORIDE 10 MG/ML
INJECTION, SOLUTION EPIDURAL; INFILTRATION; INTRACAUDAL; PERINEURAL AS NEEDED
Status: DISCONTINUED | OUTPATIENT
Start: 2017-09-27 | End: 2017-09-27 | Stop reason: HOSPADM

## 2017-09-27 RX ORDER — DIAZEPAM 5 MG/1
5-20 TABLET ORAL ONCE
Status: COMPLETED | OUTPATIENT
Start: 2017-09-27 | End: 2017-09-27

## 2017-09-27 RX ADMIN — DIAZEPAM 10 MG: 5 TABLET ORAL at 12:40

## 2017-09-27 NOTE — H&P
Date of Surgery Update:  Tyler Hogan was seen and examined. History and physical has been reviewed. The patient has been examined. There have been no significant clinical changes since the completion of the last office visit.       Signed By: Johan Montero MD     September 27, 2017 1:35 PM

## 2017-09-27 NOTE — PROCEDURES
PROCEDURE NOTE      Patient Name: Mauricio Hall    Date of Procedure: September 27, 2017    Preoperative Diagnosis:  Neuritis [M79.2]  Lumbar radiculopathy [M54.16]    Post Operative Diagnosis:  Neuritis [M79.2]  Lumbar radiculopathy [M54.16]    Procedure :    bilateral  L4 Selective Nerve Root Block    Consent:  Informed consent was obtained prior to the procedure. The patient was given the opportunity to ask questions regarding the procedure and its associated risks. In addition to the potential risks associated with the procedure itself, the patient was informed both verbally and in writing of the potential side effects of the use of glucocorticoid. The patient appeared to comprehend the informed consent and desired to have the procedure performed. Procedure: The patient was placed in the prone position on the fluoroscopy table and the back was prepped and draped in the usual sterile manner. The exact spinal level was  identified using fluoroscopy, and Lidocaine 1 % was injected locally, a # 22 gauge spinal needle was passed to the transverse process. The depth was noted and the needle redirected to pass inferior and approximately one cm anterior to the transverse process. YES  1 cc of Isovue M-200 was used to verify positioning in the epidural and paravertebral space and outlined the course of the spinal nerve into the epidural space. The same procedure was repeated at each spinal level indicated above. A total of 30 mg of preservative free Dexamethasone and 5 cc of Lidocaine was slowly injected. The patient tolerated the procedure well. The injection area was cleaned and bandaids applied. Not excessive bleeding was noted. Patient dressed and discharged to home with instructions. Discussion: The patient tolerated the procedure well.                                               Leopoldo Bolt, MD  September 27, 2017

## 2017-09-27 NOTE — DISCHARGE INSTRUCTIONS
[facility]   ([facility])  Dr. Maira Allen, Dr. Annie Morales, Dr. Asael Haider not drive a car, operate heavy machinery or dangerous equipment for 24 hours. * Activity as tolerated; rest for the remainder of the day. * Resume pre-block medications including those for your family doctor. * Do not drink alcoholic beverages for 24 hours. Alcohol and the medications you have received may interact and cause an adverse reaction. * You may feel better this evening and worse tomorrow, as the numbing medications wears off and the steroid has yet to begin to work. After 48 hrs the steroid should begin to release bringing you relief. * You may shower this evening and remove any bandages. * Avoid hot tubs and heating pads for 24 hours. You may use cold packs on the procedure site as tolerated for the first 24 hours. * If a headache develops, drink plenty of fluids and rest.  Take over the counter medications for headache if needed. If the headache continues longer than 24 hours, call MD at the [address] [place] [address]. [phone]    * Continue taking pain medications as needed. * You may resume your regular diet if tolerated. Otherwise, start with sips of water and advance slowly. * If Diabetic: check your blood sugar three times a day for the next 3 days. If your sugar is greater than 300 call your family doctor. If your sugar is greater than 400, have someone transport you to the nearest Emergency Room. * If you experience any of the following problems, Please Call the [address] [place] [address] at [phone].         * Shortness of Breath    * Fever of 101 or higher    * Nausea / Vomiting    * Severe Headache    * Weakness or numbness in arms or legs that is not      resolving    * Prolonged increase in pain greater than 4 days      DISCHARGE SUMMARY from Nurse      PATIENT INSTRUCTIONS:    After oral sedation, for 24 hours or while taking prescription Narcotics:  · Limit your activities  · Do not drive and operate hazardous machinery  · Do not make important personal or business decisions  · Do  not drink alcoholic beverages  · If you have not urinated within 8 hours after discharge, please contact your surgeon on call. Report the following to your surgeon:  · Excessive pain, swelling, redness or odor of or around the surgical area  · Temperature over 101  · Nausea and vomiting lasting longer than 4 hours or if unable to take medications  · Any signs of decreased circulation or nerve impairment to extremity: change in color, persistent  numbness, tingling, coldness or increase pain  · Any questions            What to do at Home:  Recommended activity: Activity as tolerated, NO DRIVING FOR 12 Hours post injection          *  Please give a list of your current medications to your Primary Care Provider. *  Please update this list whenever your medications are discontinued, doses are      changed, or new medications (including over-the-counter products) are added. *  Please carry medication information at all times in case of emergency situations. These are general instructions for a healthy lifestyle:    No smoking/ No tobacco products/ Avoid exposure to second hand smoke    Surgeon General's Warning:  Quitting smoking now greatly reduces serious risk to your health. Obesity, smoking, and sedentary lifestyle greatly increases your risk for illness    A healthy diet, regular physical exercise & weight monitoring are important for maintaining a healthy lifestyle    You may be retaining fluid if you have a history of heart failure or if you experience any of the following symptoms:  Weight gain of 3 pounds or more overnight or 5 pounds in a week, increased swelling in our hands or feet or shortness of breath while lying flat in bed.   Please call your doctor as soon as you notice any of these symptoms; do not wait until your next office visit. Recognize signs and symptoms of STROKE:    F-face looks uneven    A-arms unable to move or move unevenly    S-speech slurred or non-existent    T-time-call 911 as soon as signs and symptoms begin-DO NOT go       Back to bed or wait to see if you get better-TIME IS BRAIN.

## 2017-10-05 DIAGNOSIS — M79.2 NEURITIS: ICD-10-CM

## 2017-10-05 DIAGNOSIS — M51.36 BULGE OF LUMBAR DISC WITHOUT MYELOPATHY: ICD-10-CM

## 2017-10-05 RX ORDER — TOPIRAMATE 25 MG/1
TABLET ORAL
Qty: 90 TAB | Refills: 1 | Status: SHIPPED | OUTPATIENT
Start: 2017-10-05 | End: 2017-10-09 | Stop reason: ALTCHOICE

## 2017-10-06 ENCOUNTER — TELEPHONE (OUTPATIENT)
Dept: ORTHOPEDIC SURGERY | Age: 47
End: 2017-10-06

## 2017-10-06 NOTE — TELEPHONE ENCOUNTER
Patient states the block she had done last Wednesday 09-27-17 didn't help. She is still in a lot of pain.   Patient can be reached at [phone]

## 2017-10-06 NOTE — TELEPHONE ENCOUNTER
Informed patient of below message, pratient verbalized agreement/understanding. Is going to come in on Monday to see TORI Huston at 0900. Information provided to Esdras Graf at  to make appt. No further action required at this time.

## 2017-10-09 ENCOUNTER — OFFICE VISIT (OUTPATIENT)
Dept: ORTHOPEDIC SURGERY | Age: 47
End: 2017-10-09

## 2017-10-09 VITALS
BODY MASS INDEX: 26.43 KG/M2 | DIASTOLIC BLOOD PRESSURE: 81 MMHG | SYSTOLIC BLOOD PRESSURE: 123 MMHG | TEMPERATURE: 98.7 F | HEART RATE: 83 BPM | RESPIRATION RATE: 17 BRPM | HEIGHT: 64 IN | WEIGHT: 154.8 LBS | OXYGEN SATURATION: 99 %

## 2017-10-09 DIAGNOSIS — M51.36 BULGING LUMBAR DISC: ICD-10-CM

## 2017-10-09 DIAGNOSIS — M79.2 NEURITIS: Primary | ICD-10-CM

## 2017-10-09 DIAGNOSIS — M47.816 LUMBAR FACET ARTHROPATHY: ICD-10-CM

## 2017-10-09 DIAGNOSIS — M47.812 CERVICAL FACET JOINT SYNDROME: ICD-10-CM

## 2017-10-09 RX ORDER — TOPIRAMATE 25 MG/1
TABLET ORAL
Qty: 90 TAB | Refills: 1 | Status: ON HOLD | OUTPATIENT
Start: 2017-10-09 | End: 2017-11-01

## 2017-10-09 NOTE — PROGRESS NOTES
[address], [address] [place]  [place], [address] [place] [address] Street  Phone: [phone]  Fax: [phone]  PROGRESS NOTE  Patient: Monica Manzo                MRN: [id number]       SSN: [id number]  YOB: 1970        AGE: 51 y.o. SEX: female  Body mass index is 26.57 kg/(m^2). PCP: Enrique Ferreira MD  10/09/17    Chief Complaint   Patient presents with    Back Pain     FU    Leg Pain       HISTORY OF PRESENT ILLNESS:  Monica Manzo is a 51 y.o.  female with history of chronic low back, BLE, neck and LUE pain. Her neck pain radiates, to LUE from her neck to her anterior arm and to her entire hand. Her neck and LUE pain is very intermittent and comes and goes. Her LBP radiates into the LLE in the L4 distribution. Her LLE pain resolved after her two SNRBs. Her RLE pain radiates from her LB to buttock and all the way down to the foot in the L5/S1 distribution. Prior history of back and neck problems: recurrent self limited episodes of low back pain in the past, previous osteoarthritis of lumbar spine and previous herniated disc. Pain is aching, burning, numbing and tingling. Pain is worse with looking up, looking down, walking, riding in a car, lifting, pushing, pulling, and standing and affects sleep, work and recreational activities. Pain is better with lying down. She had a bilateral L4 SNRB on 08/30/17 and 09/27/17 with resolution of her LLE pain. They did not help with her RLE sciatica at all. Her lumbar MRI on 07/21/17 showed a Right L5/S1 disc protrusion with mild mass effect on the descending right S1 nerve root. I believe that this is the source of her pain. At her last OV Dr. Abdirizak Hand also performed a left hip bursa steroid injection, her hip pain has resolved. She has been to PT in the past and continues w/ a HEP. She is interested in trying one last steroid injection. We will try a Right L5/S1 SNRB for that disc protrusion.  Pt is not a diabetic, BP is stable, and is not on blood thinner and will be scheduled for a L5/S1 SNRB. States she has been using nothing for pain. She tried and failed Gabapentin due to drowsiness, she never got the Topamax, she denies hx of glaucoma or kidney stones. The risks, benefits, and potential side effects of this medication were discussed. Patient understands and wishes to proceed. Patient advised to call the office if intolerant to new medication. Denies bladder/bowel dysfunction, saddle paresthesia, weakness, gait disturbance, or other neurological deficits. Denies chills, fever,night sweats, unexplained weight loss/weight gain, chest pain, sob or anxiety. Reports no new medical issues or hospitalizations since the last visit. Pt at this time desires to  continue with current care/proceed with medication evaluation/proceed with evaluation of neck, LUE, LB and RLE pain. ASSESSMENT   Diagnoses and all orders for this visit:    1. Neuritis  -     SCHEDULE SURGERY    2. Lumbar facet arthropathy    3. Bulging lumbar disc  -     SCHEDULE SURGERY    4. Cervical facet joint syndrome    Other orders  -     topiramate (TOPAMAX) 25 mg tablet; 1 tab nightly  x 5 days, then 2 tabs nightly x 5 day and then 3 tabs nightly       IMPRESSION AND PLAN:  This is a pt with persistent Right-sided sciatica, her LLE resolved after two bilateral L4 SNRBs her neck and LUE pain is intermittent. We are going to try one last SNRB for her right sided sciatica. She is neurologically intact on exam without weakness, just pain. 1) Pt was given information on sciatica exercises   2) We discussed RFA  3) Continue HEP  4) Right L5/S1 SNRB, last block until next year 08/2018  5) Trial of Topamax, may taper up to slowly to 75mg BID  6) Ms. Robledo Sat has a reminder for a \"due or due soon\" health maintenance. I have asked that she contact her primary care provider, Rajeev Yepez MD, for follow-up on this health maintenance.   7) We have informed patient to notify us for immediate appointment if he has any worsening neurogical symptoms or if an emergency situation presents, then call 911  8)  has been reviewed and is appropriate  9) Pt will follow-up in 4-6 weeks w/ me. Subjective    Work [employer] [facility]    Smoking Status Smoker    Pain Scale: 8/10    Pain Assessment  10/9/2017   Location of Pain Back;Leg   Severity of Pain 8   Quality of Pain Sharp   Duration of Pain -   Frequency of Pain Constant   Aggravating Factors (No Data)   Aggravating Factors Comment sitting to long, coughing, sneezing   Relieving Factors (No Data)   Relieving Factors Comment Pain Medication   Result of Injury -         REVIEW OF SYSTEMS  Constitutional: Negative for fever, chills, or weight change. Respiratory: Negative for cough or shortness of breath. Cardiovascular: Negative for chest pain or palpitations. Gastrointestinal: Negative for incontinence, acid reflux, change in bowel habits, or constipation. Genitourinary: Negative for incontinence, dysuria and flank pain. Musculoskeletal: Positive for Neck, LBP and RLE pain. See HPI. Skin: Negative for rash. Neurological:RLE L5/S1  radiculopathy. See HPI. Endo/Heme/Allergies: Negative. Psychiatric/Behavioral: Negative. PHYSICAL EXAMINATION  Visit Vitals    /81    Pulse 83    Temp 98.7 °F (37.1 °C) (Oral)    Resp 17    Ht 5' 4\" (1.626 m)    Wt 154 lb 12.8 oz (70.2 kg)    LMP 01/01/2013    SpO2 99%    BMI 26.57 kg/m2         Accompanied by Self. Constitutional:  Well developed, well nourished, in no acute distress. Psychiatric: Affect and mood are appropriate. Integumentary: No rashes or abrasions noted on exposed areas. Cardiovascular/Peripheral Vascular: +2 radial & pedal pulses. No peripheral edema is noted. Lymphatic:  No evidence of lymphedema. No cervical lymphadenopathy. SPINE/MUSCULOSKELETAL EXAM    Cervical spine:  Neck is midline. Normal muscle tone. No focal atrophy is noted. Neck ROM pain with turning left. Shoulder ROM intact. Tenderness to palpation left trapezius. Negative Spurling's sign. Negative Tinel's sign. Negative Bingham's sign. Sensation grossly intact to light touch. Lumbar spine:  No rash, ecchymosis, or gross obliquity. No fasciculations. No focal atrophy is noted. Range of motion is decreased and pain with extension, turning right. Tenderness to palpation right LB and sciatic notch. . SI joints non-tender. Trochanters non tender. Straight leg raise positive on right    Sensation grossly intact to light touch. MOTOR:     Biceps Triceps Deltoids Wrist Ext Wrist Flex Hand Intrin   Right 5/5 5/5 5/5 5/5 5/5 5/5   Left 5/5 5/5 5/5 5/5 5/5 5/5      Hip Flex Quads Hamstrings Ankle DF EHL Ankle PF   Right 5/5 5/5 5/5 5/5 5/5 5/5   Left 5/5 5/5 5/5 5/5 5/5 5/5       Ambulation without assistive device. FWB. Favors RLE due to pain        PAST MEDICAL HISTORY   Past Medical History:   Diagnosis Date    Anxiety     Arthritis     Asthma     pt states she does not have asthma    Chronic pain     Chronic Back Pain     Depression     Gastrointestinal disorder     GERD (gastroesophageal reflux disease)     well controlled w/ meds    Hypertension     no medications currently; off [id number]    IBS (irritable bowel syndrome)     Other ill-defined conditions(799.89)     bulging disc    Other ill-defined conditions(799.89)     sinusitis    Tattoo     TATTOO       Past Surgical History:   Procedure Laterality Date    ENDOSCOPY, COLON, DIAGNOSTIC      HX BREAST BIOPSY      left    HX CHOLECYSTECTOMY  2016    HX CYST INCISION AND DRAINAGE      left    HX GYN  2011    cysts removed from both ovaries    HX HYSTERECTOMY      partial     LAP,CHOLECYSTECTOMY  2-25-16    Dr. Idalmis Lopez   .       MEDICATIONS      Current Outpatient Prescriptions   Medication Sig Dispense Refill    topiramate (TOPAMAX) 25 mg tablet 1 tab nightly  x 5 days, then 2 tabs nightly x 5 day and then 3 tabs nightly 90 Tab 1    SYMBICORT 160-4.5 mcg/actuation HFA inhaler inhale 2 puffs by mouth twice a day IN THE MORNING AND EVENING  0    dicyclomine (BENTYL) 10 mg capsule take 1 capsule by mouth three times a day  0    gabapentin (NEURONTIN) 300 mg capsule Take 1 in the evening for 1 week then increase to 2  Indications: NEUROPATHIC PAIN 60 Cap 1    albuterol (PROVENTIL HFA, VENTOLIN HFA, PROAIR HFA) 90 mcg/actuation inhaler Take 2 Puffs by inhalation every four (4) hours as needed for Wheezing. 1 Inhaler 0    HYDROmorphone (DILAUDID) 2 mg tablet Take 1 - 2 tablets every 4 -6 hours as needed for pain 40 Tab 0    carisoprodol (SOMA) 250 mg tablet Take 250 mg by mouth as needed for Muscle Spasm(s).  meclizine (ANTIVERT) 25 mg tablet Take  by mouth three (3) times daily as needed.  famotidine (PEPCID) 40 mg tablet Take 40 mg by mouth daily.  celecoxib (CELEBREX) 100 mg capsule Take  by mouth two (2) times a day.  losartan (COZAAR) 100 mg tablet Take 100 mg by mouth daily.  amLODIPine (NORVASC) 5 mg tablet Take 5 mg by mouth daily.  ALPRAZolam (XANAX) 0.5 mg tablet Take 1 Tab by mouth nightly as needed for Anxiety. 7 Tab 0    cholecalciferol (VITAMIN D3) 1,000 unit tablet Take 3,000 Units by mouth daily.  cetirizine (ZYRTEC) 10 mg tablet Take  by mouth daily as needed. ALLERGIES    Allergies   Allergen Reactions    Percocet [Oxycodone-Acetaminophen] Nausea and Vomiting    Advair Diskus [Fluticasone-Salmeterol] Nausea and Vomiting    Ibuprofen Nausea and Vomiting    Tylenol [Acetaminophen] Other (comments)     Causes upset stomach          SOCIAL HISTORY    Social History     Social History    Marital status:      Spouse name: N/A    Number of children: N/A    Years of education: N/A     Occupational History    Not on file.      Social History Main Topics    Smoking status: Current Every Day Smoker     Packs/day: 0.00     Last attempt to quit: 10/1/2010    Smokeless tobacco: Never Used    Alcohol use No    Drug use: No    Sexual activity: Not on file     Other Topics Concern    Not on file     Social History Narrative       FAMILY HISTORY    Family History   Problem Relation Age of Onset    Breast Cancer Mother     Coronary Artery Disease Other     Breast Cancer Sister          Telma Daniel NP

## 2017-10-09 NOTE — MR AVS SNAPSHOT
Visit Information Date & Time Provider Department Dept. Phone Encounter #  
 10/9/2017  9:00 AM Linda Mcclellan NP [facility] [phone] [id number] Follow-up Instructions Return in about 4 weeks (around 11/6/2017). Your Appointments 10/23/2017  9:30 AM  
Follow Up with Gardenia Martinez MD  
[facility] Appt Note: 3WK BLOCK FU; Bradley Crome 8/30/17  
 [address] [place] [address]  
[phone]  
  
   
 [address]  
  
    
 11/27/2017  9:00 AM  
Follow Up with Linda Mcclellan NP  
[facility] Appt Note: 4wk BLOCK FU; Bradley Crome 11/1/17  
 [address] [place] [address]  
[phone]  
  
   
 [address] [place] [address] Upcoming Health Maintenance Date Due Pneumococcal 19-64 Medium Risk (1 of 1 - PPSV23) 11/10/1988 DTaP/Tdap/Td series (1 - Tdap) 11/10/1990 PAP AKA CERVICAL CYTOLOGY 11/10/1990 INFLUENZA AGE 9 TO ADULT 8/1/2017 Allergies as of 10/9/2017  Review Complete On: 10/9/2017 By: Clyde Angel LPN Severity Noted Reaction Type Reactions Percocet [Oxycodone-acetaminophen] Medium 01/16/2013   Side Effect Nausea and Vomiting Advair Diskus [Fluticasone-salmeterol]  10/07/2015    Nausea and Vomiting Ibuprofen  11/14/2013    Nausea and Vomiting Tylenol [Acetaminophen]  09/27/2017    Other (comments) Causes upset stomach Current Immunizations  Never Reviewed No immunizations on file. Not reviewed this visit You Were Diagnosed With   
  
 Codes Comments Neuritis    -  Primary ICD-10-CM: M79.2 ICD-9-CM: 729.2 Lumbar facet arthropathy     ICD-10-CM: M12.88 ICD-9-CM: 721.3 Bulging lumbar disc     ICD-10-CM: M51.26 
ICD-9-CM: 722.10 Cervical facet joint syndrome     ICD-10-CM: M12.88 ICD-9-CM: 723.8 Vitals BP Pulse Temp Resp Height(growth percentile) Weight(growth percentile) 123/81 83 98.7 °F (37.1 °C) (Oral) 17 5' 4\" (1.626 m) 154 lb 12.8 oz (70.2 kg) LMP SpO2 BMI OB Status Smoking Status 01/01/2013 99% 26.57 kg/m2 Hysterectomy Current Every Day Smoker BMI and BSA Data Body Mass Index Body Surface Area  
 26.57 kg/m 2 1.78 m 2 Preferred Pharmacy Pharmacy Name Phone [id number] [place], [address] Your Updated Medication List  
  
   
This list is accurate as of: 10/9/17  9:48 AM.  Always use your most recent med list.  
  
  
  
  
 albuterol 90 mcg/actuation inhaler Commonly known as:  PROVENTIL HFA, VENTOLIN HFA, PROAIR HFA Take 2 Puffs by inhalation every four (4) hours as needed for Wheezing. ALPRAZolam 0.5 mg tablet Commonly known as:  Willaim [place] Take 1 Tab by mouth nightly as needed for Anxiety. carisoprodol 250 mg tablet Commonly known as:  SOMA Take 250 mg by mouth as needed for Muscle Spasm(s). celecoxib 100 mg capsule Commonly known as:  CELEBREX Take  by mouth two (2) times a day. dicyclomine 10 mg capsule Commonly known as:  BENTYL  
take 1 capsule by mouth three times a day  
  
 famotidine 40 mg tablet Commonly known as:  PEPCID Take 40 mg by mouth daily. gabapentin 300 mg capsule Commonly known as:  NEURONTIN Take 1 in the evening for 1 week then increase to 2  Indications: NEUROPATHIC PAIN  
  
 HYDROmorphone 2 mg tablet Commonly known as:  DILAUDID Take 1 - 2 tablets every 4 -6 hours as needed for pain  
  
 losartan 100 mg tablet Commonly known as:  COZAAR Take 100 mg by mouth daily. meclizine 25 mg tablet Commonly known as:  ANTIVERT Take  by mouth three (3) times daily as needed. NORVASC 5 mg tablet Generic drug:  amLODIPine Take 5 mg by mouth daily. SYMBICORT 160-4.5 mcg/actuation [facility] Generic drug:  budesonide-formoterol  
inhale 2 puffs by mouth twice a day IN THE MORNING AND EVENING  
  
 topiramate 25 mg tablet Commonly known as:  TOPAMAX  
1 tab nightly  x 5 days, then 2 tabs nightly x 5 day and then 3 tabs nightly VITAMIN D3 1,000 unit tablet Generic drug:  cholecalciferol Take 3,000 Units by mouth daily. ZyrTEC 10 mg tablet Generic drug:  cetirizine Take  by mouth daily as needed. Prescriptions Sent to Pharmacy Refills  
 topiramate (TOPAMAX) 25 mg tablet 1 Si tab nightly  x 5 days, then 2 tabs nightly x 5 day and then 3 tabs nightly Class: Normal  
 Pharmacy: [id number]- [place], [address] [facility] [address]  #: [phone] We Performed the Following SCHEDULE SURGERY [HXV2229 Custom] Follow-up Instructions Return in about 4 weeks (around 2017). Patient Instructions Sciatica: Exercises Your Care Instructions Here are some examples of typical rehabilitation exercises for your condition. Start each exercise slowly. Ease off the exercise if you start to have pain. Your doctor or physical therapist will tell you when you can start these exercises and which ones will work best for you. When you are not being active, find a comfortable position for rest. Some people are comfortable on the floor or a medium-firm bed with a small pillow under their head and another under their knees. Some people prefer to lie on their side with a pillow between their knees. Don't stay in one position for too long. Take short walks (10 to 20 minutes) every 2 to 3 hours. Avoid slopes, hills, and stairs until you feel better. Walk only distances you can manage without pain, especially leg pain. How to do the exercises Back stretches 1. Get down on your hands and knees on the floor. 2. Relax your head and allow it to droop. Round your back up toward the ceiling until you feel a nice stretch in your upper, middle, and lower back. Hold this stretch for as long as it feels comfortable, or about 15 to 30 seconds. 3. Return to the starting position with a flat back while you are on your hands and knees. 4. Let your back sway by pressing your stomach toward the floor. Lift your buttocks toward the ceiling. 5. Hold this position for 15 to 30 seconds. 6. Repeat 2 to 4 times. Follow-up care is a key part of your treatment and safety. Be sure to make and go to all appointments, and call your doctor if you are having problems. It's also a good idea to know your test results and keep a list of the medicines you take. Where can you learn more? Go to http://fernando-chriss.info/. Enter M441 in the search box to learn more about \"Sciatica: Exercises. \" Current as of: March 21, 2017 Content Version: 11.3 © 4843-3707 [employer]. Care instructions adapted under license by [employer] (which disclaims liability or warranty for this information). If you have questions about a medical condition or this instruction, always ask your healthcare professional. [place] [address] any warranty or liability for your use of this information. Introducing [facility]! Dear Wilmar Ballard: Thank you for requesting a Allmyapps account. Our records indicate that you already have an active Allmyapps account. You can access your account anytime at [email or web address] Did you know that you can access your hospital and ER discharge instructions at any time in Allmyapps? You can also review all of your test results from your hospital stay or ER visit. Additional Information If you have questions, please visit the Frequently Asked Questions section of the Allmyapps website at [email or web address] Remember, MyChart is NOT to be used for urgent needs. For medical emergencies, dial 911. Now available from your iPhone and Android! Please provide this summary of care documentation to your next provider. Your primary care clinician is listed as Wai Rowan. If you have any questions after today's visit, please call [phone].

## 2017-10-09 NOTE — PATIENT INSTRUCTIONS
Sciatica: Exercises  Your Care Instructions  Here are some examples of typical rehabilitation exercises for your condition. Start each exercise slowly. Ease off the exercise if you start to have pain. Your doctor or physical therapist will tell you when you can start these exercises and which ones will work best for you. When you are not being active, find a comfortable position for rest. Some people are comfortable on the floor or a medium-firm bed with a small pillow under their head and another under their knees. Some people prefer to lie on their side with a pillow between their knees. Don't stay in one position for too long. Take short walks (10 to 20 minutes) every 2 to 3 hours. Avoid slopes, hills, and stairs until you feel better. Walk only distances you can manage without pain, especially leg pain. How to do the exercises  Back stretches    1. Get down on your hands and knees on the floor. 2. Relax your head and allow it to droop. Round your back up toward the ceiling until you feel a nice stretch in your upper, middle, and lower back. Hold this stretch for as long as it feels comfortable, or about 15 to 30 seconds. 3. Return to the starting position with a flat back while you are on your hands and knees. 4. Let your back sway by pressing your stomach toward the floor. Lift your buttocks toward the ceiling. 5. Hold this position for 15 to 30 seconds. 6. Repeat 2 to 4 times. Follow-up care is a key part of your treatment and safety. Be sure to make and go to all appointments, and call your doctor if you are having problems. It's also a good idea to know your test results and keep a list of the medicines you take. Where can you learn more? Go to http://fernando-chriss.info/. Enter X694 in the search box to learn more about \"Sciatica: Exercises. \"  Current as of: March 21, 2017  Content Version: 11.3  © 8585-1618 [employer].  Care instructions adapted under license by [address] (which disclaims liability or warranty for this information). If you have questions about a medical condition or this instruction, always ask your healthcare professional. [address] any warranty or liability for your use of this information.

## 2017-10-12 ENCOUNTER — HOSPITAL ENCOUNTER (EMERGENCY)
Age: 47
Discharge: HOME | End: 2017-10-12
Attending: EMERGENCY MEDICINE
Payer: COMMERCIAL

## 2017-10-12 VITALS
WEIGHT: 155 LBS | HEART RATE: 83 BPM | SYSTOLIC BLOOD PRESSURE: 136 MMHG | HEIGHT: 64 IN | RESPIRATION RATE: 18 BRPM | BODY MASS INDEX: 26.46 KG/M2 | OXYGEN SATURATION: 99 % | DIASTOLIC BLOOD PRESSURE: 64 MMHG | TEMPERATURE: 98.6 F

## 2017-10-12 DIAGNOSIS — J45.901 ACUTE BRONCHITIS WITH ASTHMA WITH ACUTE EXACERBATION: Primary | ICD-10-CM

## 2017-10-12 DIAGNOSIS — J20.9 ACUTE BRONCHITIS WITH ASTHMA WITH ACUTE EXACERBATION: Primary | ICD-10-CM

## 2017-10-12 PROCEDURE — 99283 EMERGENCY DEPT VISIT LOW MDM: CPT

## 2017-10-12 PROCEDURE — 74011000250 HC RX REV CODE- 250: Performed by: EMERGENCY MEDICINE

## 2017-10-12 PROCEDURE — 77030013140 HC MSK NEB VYRM -A

## 2017-10-12 PROCEDURE — 74011636637 HC RX REV CODE- 636/637: Performed by: PHYSICIAN ASSISTANT

## 2017-10-12 PROCEDURE — 94640 AIRWAY INHALATION TREATMENT: CPT

## 2017-10-12 RX ORDER — ALBUTEROL SULFATE 90 UG/1
2 AEROSOL, METERED RESPIRATORY (INHALATION)
Qty: 1 INHALER | Refills: 0 | Status: SHIPPED | OUTPATIENT
Start: 2017-10-12

## 2017-10-12 RX ORDER — PREDNISONE 10 MG/1
TABLET ORAL
Qty: 21 TAB | Refills: 0 | Status: ON HOLD | OUTPATIENT
Start: 2017-10-12 | End: 2017-11-01

## 2017-10-12 RX ORDER — NEBULIZER AND COMPRESSOR
EACH MISCELLANEOUS
Qty: 1 EACH | Refills: 0 | Status: SHIPPED | OUTPATIENT
Start: 2017-10-12 | End: 2017-10-12

## 2017-10-12 RX ORDER — IPRATROPIUM BROMIDE AND ALBUTEROL SULFATE 2.5; .5 MG/3ML; MG/3ML
3 SOLUTION RESPIRATORY (INHALATION)
Status: COMPLETED | OUTPATIENT
Start: 2017-10-12 | End: 2017-10-12

## 2017-10-12 RX ORDER — PROMETHAZINE HYDROCHLORIDE, PHENYLEPHRINE HYDROCHLORIDE AND CODEINE PHOSPHATE 6.25; 5; 1 MG/5ML; MG/5ML; MG/5ML
5 SOLUTION ORAL
Qty: 120 ML | Refills: 0 | Status: SHIPPED | OUTPATIENT
Start: 2017-10-12 | End: 2018-01-25 | Stop reason: ALTCHOICE

## 2017-10-12 RX ORDER — ALBUTEROL SULFATE 1.25 MG/3ML
1.25 SOLUTION RESPIRATORY (INHALATION)
Qty: 25 EACH | Refills: 0 | Status: SHIPPED | OUTPATIENT
Start: 2017-10-12

## 2017-10-12 RX ORDER — NEBULIZER AND COMPRESSOR
EACH MISCELLANEOUS
Qty: 1 EACH | Refills: 0 | Status: SHIPPED | OUTPATIENT
Start: 2017-10-12

## 2017-10-12 RX ORDER — PREDNISONE 20 MG/1
60 TABLET ORAL
Status: COMPLETED | OUTPATIENT
Start: 2017-10-12 | End: 2017-10-12

## 2017-10-12 RX ADMIN — IPRATROPIUM BROMIDE AND ALBUTEROL SULFATE 3 ML: .5; 3 SOLUTION RESPIRATORY (INHALATION) at 06:58

## 2017-10-12 RX ADMIN — PREDNISONE 60 MG: 20 TABLET ORAL at 07:19

## 2017-10-12 RX ADMIN — IPRATROPIUM BROMIDE AND ALBUTEROL SULFATE 3 ML: .5; 3 SOLUTION RESPIRATORY (INHALATION) at 07:26

## 2017-10-12 NOTE — DISCHARGE INSTRUCTIONS
Asthma Attack: Care Instructions  Your Care Instructions    During an asthma attack, the airways swell and narrow. This makes it hard to breathe. Severe asthma attacks can be life-threatening, but you can help prevent them by keeping your asthma under control and treating symptoms before they get bad. Symptoms include being short of breath, having chest tightness, coughing, and wheezing. Noting and treating these symptoms can also help you avoid future trips to the emergency room. The doctor has checked you carefully, but problems can develop later. If you notice any problems or new symptoms, get medical treatment right away. Follow-up care is a key part of your treatment and safety. Be sure to make and go to all appointments, and call your doctor if you are having problems. It's also a good idea to know your test results and keep a list of the medicines you take. How can you care for yourself at home? · Follow your asthma action plan to prevent and treat attacks. If you don't have an asthma action plan, work with your doctor to create one. · Take your asthma medicines exactly as prescribed. Talk to your doctor right away if you have any questions about how to take them. ¨ Use your quick-relief medicine when you have symptoms of an attack. Quick-relief medicine is usually an albuterol inhaler. Some people need to use quick-relief medicine before they exercise. ¨ Take your controller medicine every day, not just when you have symptoms. Controller medicine is usually an inhaled corticosteroid. The goal is to prevent problems before they occur. Don't use your controller medicine to treat an attack that has already started. It doesn't work fast enough to help. ¨ If your doctor prescribed corticosteroid pills to use during an attack, take them exactly as prescribed. It may take hours for the pills to work, but they may make the episode shorter and help you breathe better.   ¨ Keep your quick-relief medicine with you at all times. · Talk to your doctor before using other medicines. Some medicines, such as aspirin, can cause asthma attacks in some people. · If you have a peak flow meter, use it to check how well you are breathing. This can help you predict when an asthma attack is going to occur. Then you can take medicine to prevent the asthma attack or make it less severe. · Do not smoke or allow others to smoke around you. Avoid smoky places. Smoking makes asthma worse. If you need help quitting, talk to your doctor about stop-smoking programs and medicines. These can increase your chances of quitting for good. · Learn what triggers an asthma attack for you, and avoid the triggers when you can. Common triggers include colds, smoke, air pollution, dust, pollen, mold, pets, cockroaches, stress, and cold air. · Avoid colds and the flu. Get a pneumococcal vaccine shot. If you have had one before, ask your doctor if you need a second dose. Get a flu vaccine every fall. If you must be around people with colds or the flu, wash your hands often. When should you call for help? Call 911 anytime you think you may need emergency care. For example, call if:  · You have severe trouble breathing. Call your doctor now or seek immediate medical care if:  · Your symptoms do not get better after you have followed your asthma action plan. · You have new or worse trouble breathing. · Your coughing and wheezing get worse. · You cough up dark brown or bloody mucus (sputum). · You have a new or higher fever. Watch closely for changes in your health, and be sure to contact your doctor if:  · You need to use quick-relief medicine on more than 2 days a week (unless it is just for exercise). · You cough more deeply or more often, especially if you notice more mucus or a change in the color of your mucus. · You are not getting better as expected. Where can you learn more?   Go to http://fernando-chriss.info/. Enter P651 in the search box to learn more about \"Asthma Attack: Care Instructions. \"  Current as of: March 25, 2017  Content Version: 11.3  © 4512-1619 [employer]. Care instructions adapted under license by [employer] (which disclaims liability or warranty for this information). If you have questions about a medical condition or this instruction, always ask your healthcare professional. [address] any warranty or liability for your use of this information. Learning About Asthma Triggers  What are asthma triggers? When you have asthma, certain things can make your symptoms worse. These are called triggers. Learn what triggers an asthma attack for you, and avoid the triggers when you can. Common triggers include colds, smoke, air pollution, dust, pollen, pets, stress, and cold air. How do asthma triggers affect you? Triggers can make it harder for your lungs to work as they should. They can lead to sudden breathing problems and other symptoms. When you are around a trigger, an asthma attack is more likely. If your symptoms are severe, you may need emergency treatment or have to go to the hospital for treatment. What can you do to avoid triggers? The first thing is to know your triggers. When you are having symptoms, note the things around you that might be causing them. Then look for patterns that may be triggering your symptoms. Record your triggers on a piece of paper or in an asthma diary. When you have your list of possible triggers, work with your doctor to find ways to avoid them. Avoid colds and flu. Get a pneumococcal vaccine shot. If you have had one before, ask your doctor whether you need a second dose. Get a flu vaccine every year, as soon as it's available. If you must be around people with colds or the flu, wash your hands often. Here are some ways to avoid a few common triggers.   · Do not smoke or allow others to smoke around you. If you need help quitting, talk to your doctor about stop-smoking programs and medicines. These can increase your chances of quitting for good. · If there is a lot of pollution, pollen, or dust outside, stay at home and keep your windows closed. Use an air conditioner or air filter in your home. Check your local weather report or newspaper for air quality and pollen reports. What else should you know? · Take your controller medicine every day, not just when you have symptoms. It helps prevent problems before they occur. · Your doctor may suggest that you check how well your lungs are working by measuring your peak expiratory flow (PEF) throughout the day. Your PEF may drop when you are near things that trigger symptoms. Where can you learn more? Go to http://fernando-chriss.info/. Enter H182 in the search box to learn more about \"Learning About Asthma Triggers. \"  Current as of: March 25, 2017  Content Version: 11.3  © 9340-1265 [employer]. Care instructions adapted under license by [employer] (which disclaims liability or warranty for this information). If you have questions about a medical condition or this instruction, always ask your healthcare professional. [place] [address] any warranty or liability for your use of this information.

## 2017-10-12 NOTE — ED PROVIDER NOTES
[address]  EMERGENCY DEPARTMENT HISTORY AND PHYSICAL EXAM       Date: 10/12/2017   Patient Name: Sarah Wiley   YOB: 1970  Medical Record Number: [id number]    History of Presenting Illness     Chief Complaint   Patient presents with    Wheezing        History Provided By:  patient    Additional History: 6:58 AM  Sarah Wiley is a 51 y.o. female who presents to the emergency department C/O wheezing, onset 2 days. Associated sxs include productive cough. Pt has been diagnosed with bronchial asthma. Pt has an inhaler but not a nebulizer machine. Pt endorses smoking. Pt denies fever, and any other symptoms or complaints.     Primary Care Provider: Jillian Burgess MD   Specialist:    Past History     Past Medical History:   Past Medical History:   Diagnosis Date    Anxiety     Arthritis     Asthma     pt states she does not have asthma    Chronic pain     Chronic Back Pain     Depression     Gastrointestinal disorder     GERD (gastroesophageal reflux disease)     well controlled w/ meds    Hypertension     no medications currently; off eyabo2o    IBS (irritable bowel syndrome)     Other ill-defined conditions(799.89)     bulging disc    Other ill-defined conditions(799.89)     sinusitis    Tattoo     TATTOO        Past Surgical History:   Past Surgical History:   Procedure Laterality Date    ENDOSCOPY, COLON, DIAGNOSTIC      HX BREAST BIOPSY      left    HX CHOLECYSTECTOMY  2016    HX CYST INCISION AND DRAINAGE      left    HX GYN  2011    cysts removed from both ovaries    HX HYSTERECTOMY      partial     LAP,CHOLECYSTECTOMY  2-25-16    Dr. Katalina Castro        Family History:   Family History   Problem Relation Age of Onset    Breast Cancer Mother     Coronary Artery Disease Other     Breast Cancer Sister         Social History:   Social History   Substance Use Topics    Smoking status: Current Every Day Smoker     Packs/day: 0.00     Last attempt to quit: 10/1/2010    Smokeless tobacco: Never Used    Alcohol use No        Allergies: Allergies   Allergen Reactions    Percocet [Oxycodone-Acetaminophen] Nausea and Vomiting    Advair Diskus [Fluticasone-Salmeterol] Nausea and Vomiting    Ibuprofen Nausea and Vomiting    Tylenol [Acetaminophen] Other (comments)     Causes upset stomach        Review of Systems   Review of Systems   Constitutional: Negative for fever. HENT: Negative for congestion and rhinorrhea. Respiratory: Positive for cough and wheezing. Cardiovascular: Negative for chest pain and leg swelling. All other systems reviewed and are negative. Physical Exam  Vitals:    10/12/17 0644   BP: 136/64   Pulse: 83   Resp: 18   Temp: 98.6 °F (37 °C)   SpO2: 99%   Weight: 70.3 kg (155 lb)   Height: 5' 4\" (1.626 m)       Physical Exam   Constitutional: She is oriented to person, place, and time. She appears well-developed and well-nourished. No distress. HENT:   Head: Normocephalic and atraumatic. Eyes: Conjunctivae and EOM are normal. Pupils are equal, round, and reactive to light. Neck: Normal range of motion. Neck supple. Cardiovascular: Normal rate and regular rhythm. Pulmonary/Chest: Effort normal. No tachypnea. No respiratory distress. She has wheezes. Musculoskeletal: Normal range of motion. She exhibits no edema or tenderness. Neurological: She is alert and oriented to person, place, and time. Skin: Skin is warm and dry. Psychiatric: She has a normal mood and affect. Her behavior is normal.   Nursing note and vitals reviewed. Diagnostic Study Results     Labs -    No results found for this or any previous visit (from the past 12 hour(s)). Radiologic Studies -  The following have been ordered and reviewed:  No orders to display           Medical Decision Making   I am the first provider for this patient.      I reviewed the vital signs, available nursing notes, past medical history, past surgical history, family history and social history. Vital Signs-Reviewed the patient's vital signs. Patient Vitals for the past 12 hrs:   Temp Pulse Resp BP SpO2   10/12/17 0644 98.6 °F (37 °C) 83 18 136/64 99 %       Pulse Oximetry Analysis - Normal 99% on room air       Old Medical Records: Nursing notes. Procedures:   Procedures    ED Course:  6:58 AM  Initial assessment performed. The patients presenting problems have been discussed, and they are in agreement with the care plan formulated and outlined with them. I have encouraged them to ask questions as they arise throughout their visit. PROGRESS NOTE:   7:24 AM  Pt has been re-examined by Krzysztof Chowdhury PA-C. Pt is feeling better. Still has expiratory wheezes. Second nebulizer ordered. Stats are 100% on room air. PROGRESS NOTE:   7:57 AM  Pt has been re-examined by Krzysztof Chowdhury PA-C. Pt is feeling much better with minimal wheezing and is ready to go home. Medications Given in the ED:  Medications   albuterol-ipratropium (DUO-NEB) 2.5 MG-0.5 MG/3 ML (3 mL Nebulization Given 10/12/17 0726)   albuterol-ipratropium (DUO-NEB) 2.5 MG-0.5 MG/3 ML (3 mL Nebulization Given 10/12/17 0658)   predniSONE (DELTASONE) tablet 60 mg (60 mg Oral Given 10/12/17 0719)       Discharge Note:  8:00AM  Pt has been reexamined. Patient has no new complaints, changes, or physical findings. Care plan outlined and precautions discussed. Results were reviewed with the patient. All medications were reviewed with the patient; will d/c home with albuterol, nebulizer & compressor machine, nubulizer kit, sterapred, phenergan Vc with codeine. All of pt's questions and concerns were addressed. Patient was instructed and agrees to follow up with PCP, as well as to return to the ED upon further deterioration. Patient is ready to go home. Diagnosis   Clinical Impression:   1.  Acute bronchitis with asthma with acute exacerbation           Follow-up Information     Follow up With Details Comments Contact Info    Vibha Wong MD Call in 2 days For primary care follow up [address] [facility] [address]      THE [facility] EMERGENCY DEPT Go to As needed, If symptoms worsen [address] [phone]  [phone]          Current Discharge Medication List      START taking these medications    Details   albuterol (ACCUNEB) 1.25 mg/3 mL nebu Take 3 mL by inhalation every four (4) hours as needed (wheezing). Qty: 25 Each, Refills: 0      Nebulizer & Compressor machine DX: Bronchial Asthma  Qty: 1 Each, Refills: 0      Nebulizer Accessories kit Use as directed  Qty: 1 Kit, Refills: 0      predniSONE (STERAPRED DS) 10 mg dose pack Use as directed  Qty: 21 Tab, Refills: 0      promethazine-phenyleph-codeine (PHENERGAN VC WITH CODEINE) 6.25-5-10 mg/5 mL syrp Take 5 mL by mouth every six (6) hours as needed. Max Daily Amount: 20 mL. Qty: 120 mL, Refills: 0         CONTINUE these medications which have CHANGED    Details   albuterol (PROVENTIL HFA, VENTOLIN HFA, PROAIR HFA) 90 mcg/actuation inhaler Take 2 Puffs by inhalation every four (4) hours as needed for Wheezing. Qty: 1 Inhaler, Refills: 0             _______________________________   Attestations: This note is prepared by Vicente Jean Baptiste, acting as a Scribe for Dipika Toscano PA-C on 7:00 AM on 10/12/2017 . Dipika Toscano PA-C: The scribe's documentation has been prepared under my direction and personally reviewed by me in its entirety.

## 2017-10-13 ENCOUNTER — PATIENT OUTREACH (OUTPATIENT)
Dept: OTHER | Age: 47
End: 2017-10-13

## 2017-10-13 NOTE — PROGRESS NOTES
Patient identified as eligible for [address] [place] [address] services. Second telephone outreach attempted. Left discreet voicemail with this CM confidential contact information. Will send UTR letter.

## 2017-10-13 NOTE — LETTER
10/13/2017 2:36 PM 
 
 
Ms. Carmelina Hong 
[address] [facility] [address] Dear Ms Bernarda Corcoran My name is Nakul Layton , Employee Care Manager for [employer], and I have been trying to reach you. The Employee Care  is a free-of-charge, confidential service provided to our employees and their family members covered by the [employer]. Part of my job is to follow up with members who have recently been in the hospital or emergency room, to help them coordinate their care and answer questions they may have about their visit. I am able to provide assistance with medication questions, scheduling needed follow-up appointments, and arranging services like home health or home medical equipment. I can also provide education regarding your hospital or ER visit as well as your medical conditions. As healthcare providers, we know that patients do better when they have close follow up with a primary care provider (PCP), especially after a hospital or emergency department visit. If you do not have a PCP, I can help you find one that is convenient to you and covered by your insurance. I can also help you understand any after visit instructions, such as what symptoms to watch out for, or any new or changed medications. Remember that you can access your After Visit Summary by logging into your [employer] account. If you do not have a [employer] account, I can help you request access. Our program is designed to provide you with the opportunity to have a [employer] care manager partner with you for your healthcare needs. Please contact me at the below number if I can provide you with assistance for any of the above services. Sincerely, Sincerely, Padmini Avendaño, SHAHEEN 
[phone]

## 2017-10-26 ENCOUNTER — PATIENT OUTREACH (OUTPATIENT)
Dept: OTHER | Age: 47
End: 2017-10-26

## 2017-11-01 ENCOUNTER — HOSPITAL ENCOUNTER (OUTPATIENT)
Age: 47
Setting detail: OUTPATIENT SURGERY
Discharge: HOME | End: 2017-11-01
Attending: PHYSICAL MEDICINE & REHABILITATION | Admitting: PHYSICAL MEDICINE & REHABILITATION
Payer: COMMERCIAL

## 2017-11-01 ENCOUNTER — APPOINTMENT (OUTPATIENT)
Dept: GENERAL RADIOLOGY | Age: 47
End: 2017-11-01
Attending: PHYSICAL MEDICINE & REHABILITATION
Payer: COMMERCIAL

## 2017-11-01 VITALS
SYSTOLIC BLOOD PRESSURE: 91 MMHG | HEART RATE: 82 BPM | HEIGHT: 64 IN | TEMPERATURE: 98.3 F | OXYGEN SATURATION: 100 % | DIASTOLIC BLOOD PRESSURE: 69 MMHG | BODY MASS INDEX: 25.95 KG/M2 | RESPIRATION RATE: 18 BRPM | WEIGHT: 152 LBS

## 2017-11-01 PROCEDURE — 74011636320 HC RX REV CODE- 636/320

## 2017-11-01 PROCEDURE — 74011636320 HC RX REV CODE- 636/320: Performed by: PHYSICAL MEDICINE & REHABILITATION

## 2017-11-01 PROCEDURE — 77030003676 HC NDL SPN MPRI -A: Performed by: PHYSICAL MEDICINE & REHABILITATION

## 2017-11-01 PROCEDURE — 76010000009 HC PAIN MGT 0 TO 30 MIN PROC: Performed by: PHYSICAL MEDICINE & REHABILITATION

## 2017-11-01 PROCEDURE — 74011250637 HC RX REV CODE- 250/637: Performed by: PHYSICAL MEDICINE & REHABILITATION

## 2017-11-01 PROCEDURE — 77030003669 HC NDL SPN COOK -B: Performed by: PHYSICAL MEDICINE & REHABILITATION

## 2017-11-01 PROCEDURE — 74011000250 HC RX REV CODE- 250: Performed by: PHYSICAL MEDICINE & REHABILITATION

## 2017-11-01 PROCEDURE — 74011000250 HC RX REV CODE- 250

## 2017-11-01 PROCEDURE — 74011250636 HC RX REV CODE- 250/636: Performed by: PHYSICAL MEDICINE & REHABILITATION

## 2017-11-01 PROCEDURE — 74011250636 HC RX REV CODE- 250/636

## 2017-11-01 RX ORDER — DIAZEPAM 5 MG/1
5-20 TABLET ORAL ONCE
Status: COMPLETED | OUTPATIENT
Start: 2017-11-01 | End: 2017-11-01

## 2017-11-01 RX ORDER — LIDOCAINE HYDROCHLORIDE 10 MG/ML
INJECTION, SOLUTION EPIDURAL; INFILTRATION; INTRACAUDAL; PERINEURAL AS NEEDED
Status: DISCONTINUED | OUTPATIENT
Start: 2017-11-01 | End: 2017-11-01 | Stop reason: HOSPADM

## 2017-11-01 RX ORDER — DEXAMETHASONE SODIUM PHOSPHATE 100 MG/10ML
INJECTION INTRAMUSCULAR; INTRAVENOUS AS NEEDED
Status: DISCONTINUED | OUTPATIENT
Start: 2017-11-01 | End: 2017-11-01 | Stop reason: HOSPADM

## 2017-11-01 RX ADMIN — DIAZEPAM 10 MG: 5 TABLET ORAL at 12:46

## 2017-11-01 NOTE — DISCHARGE INSTRUCTIONS
[facility]   ([facility])  Dr. Lesli Yi, Dr. George Babb, Dr. Lynn Raines not drive a car, operate heavy machinery or dangerous equipment for 24 hours. * Activity as tolerated; rest for the remainder of the day. * Resume pre-block medications including those for your family doctor. * Do not drink alcoholic beverages for 24 hours. Alcohol and the medications you have received may interact and cause an adverse reaction. * You may feel better this evening and worse tomorrow, as the numbing medications wears off and the steroid has yet to begin to work. After 48 hrs the steroid should begin to release bringing you relief. * You may shower this evening and remove any bandages. * Avoid hot tubs and heating pads for 24 hours. You may use cold packs on the procedure site as tolerated for the first 24 hours. * If a headache develops, drink plenty of fluids and rest.  Take over the counter medications for headache if needed. If the headache continues longer than 24 hours, call MD at the [address] [place] [address]. [phone]    * Continue taking pain medications as needed. * You may resume your regular diet if tolerated. Otherwise, start with sips of water and advance slowly. * If Diabetic: check your blood sugar three times a day for the next 3 days. If your sugar is greater than 300 call your family doctor. If your sugar is greater than 400, have someone transport you to the nearest Emergency Room. * If you experience any of the following problems, Please Call the [address] [place] [address] at [phone].         * Shortness of Breath    * Fever of 101 or higher    * Nausea / Vomiting    * Severe Headache    * Weakness or numbness in arms or legs that is not      resolving    * Prolonged increase in pain greater than 4 days      DISCHARGE SUMMARY from Nurse      PATIENT INSTRUCTIONS:    After oral sedation, for 24 hours or while taking prescription Narcotics:  · Limit your activities  · Do not drive and operate hazardous machinery  · Do not make important personal or business decisions  · Do  not drink alcoholic beverages  · If you have not urinated within 8 hours after discharge, please contact your surgeon on call. Report the following to your surgeon:  · Excessive pain, swelling, redness or odor of or around the surgical area  · Temperature over 101  · Nausea and vomiting lasting longer than 4 hours or if unable to take medications  · Any signs of decreased circulation or nerve impairment to extremity: change in color, persistent  numbness, tingling, coldness or increase pain  · Any questions            What to do at Home:  Recommended activity: Activity as tolerated, NO DRIVING FOR 12 Hours post injection          *  Please give a list of your current medications to your Primary Care Provider. *  Please update this list whenever your medications are discontinued, doses are      changed, or new medications (including over-the-counter products) are added. *  Please carry medication information at all times in case of emergency situations. These are general instructions for a healthy lifestyle:    No smoking/ No tobacco products/ Avoid exposure to second hand smoke    Surgeon General's Warning:  Quitting smoking now greatly reduces serious risk to your health. Obesity, smoking, and sedentary lifestyle greatly increases your risk for illness    A healthy diet, regular physical exercise & weight monitoring are important for maintaining a healthy lifestyle    You may be retaining fluid if you have a history of heart failure or if you experience any of the following symptoms:  Weight gain of 3 pounds or more overnight or 5 pounds in a week, increased swelling in our hands or feet or shortness of breath while lying flat in bed.   Please call your doctor as soon as you notice any of these symptoms; do not wait until your next office visit. Recognize signs and symptoms of STROKE:    F-face looks uneven    A-arms unable to move or move unevenly    S-speech slurred or non-existent    T-time-call 911 as soon as signs and symptoms begin-DO NOT go       Back to bed or wait to see if you get better-TIME IS BRAIN.

## 2017-11-01 NOTE — H&P
Date of Surgery Update:  Billy Kim was seen and examined. History and physical has been reviewed. The patient has been examined. There have been no significant clinical changes since the completion of the last office visit.       Signed By: Gardenia Martinez MD     November 1, 2017 12:41 PM

## 2017-11-01 NOTE — PROCEDURES
PROCEDURE NOTE      Patient Name: Vito De Anda    Date of Procedure: November 1, 2017    Preoperative Diagnosis:  Acute neuritis [M79.2]  Bulge of lumbar disc without myelopathy [M51.26]    Post Operative Diagnosis:  Acute neuritis [M79.2]  Bulge of lumbar disc without myelopathy [M51.26]    Procedure :    right L5 Selective Nerve Root Block  right S1 Selective Nerve Root Block    Consent:  Informed consent was obtained prior to the procedure. The patient was given the opportunity to ask questions regarding the procedure and its associated risks. In addition to the potential risks associated with the procedure itself, the patient was informed both verbally and in writing of the potential side effects of the use of glucocorticoid. The patient appeared to comprehend the informed consent and desired to have the procedure performed. Procedure: The patient was placed in the prone position on the fluoroscopy table and the back was prepped and draped in the usual sterile manner. The exact spinal level was  identified using fluoroscopy, and Lidocaine 1 % was injected locally, a # 22 gauge spinal needle was passed to the transverse process. The depth was noted and the needle redirected to pass inferior and approximately one cm anterior to the transverse process. YES  1 cc of Isovue M-200 was used to verify positioning in the epidural and paravertebral space and outlined the course of the spinal nerve into the epidural space. The same procedure was repeated at each spinal level indicated above. A total of 30 mg of preservative free Dexamethasone and 5 cc of Lidocaine was slowly injected. The patient tolerated the procedure well. The injection area was cleaned and bandaids applied. Not excessive bleeding was noted. Patient dressed and discharged to home with instructions. Discussion: The patient tolerated the procedure well.                                               Сергей Alexis MD  November 1, 2017

## 2017-11-27 ENCOUNTER — OFFICE VISIT (OUTPATIENT)
Dept: ORTHOPEDIC SURGERY | Age: 47
End: 2017-11-27

## 2017-11-27 VITALS
BODY MASS INDEX: 26.29 KG/M2 | HEART RATE: 90 BPM | TEMPERATURE: 97.6 F | WEIGHT: 154 LBS | HEIGHT: 64 IN | SYSTOLIC BLOOD PRESSURE: 124 MMHG | RESPIRATION RATE: 18 BRPM | OXYGEN SATURATION: 100 % | DIASTOLIC BLOOD PRESSURE: 78 MMHG

## 2017-11-27 DIAGNOSIS — M79.2 NEURITIS: ICD-10-CM

## 2017-11-27 DIAGNOSIS — M51.36 BULGING LUMBAR DISC: Primary | ICD-10-CM

## 2017-11-27 DIAGNOSIS — M25.552 PAIN OF LEFT HIP JOINT: ICD-10-CM

## 2017-11-27 DIAGNOSIS — M47.816 LUMBAR FACET ARTHROPATHY: ICD-10-CM

## 2017-11-27 NOTE — MR AVS SNAPSHOT
Visit Information Date & Time Provider Department Dept. Phone Encounter #  
 11/27/2017  9:00 AM Mann Clarke NP [facility] [phone] [id number] Follow-up Instructions Return in about 2 months (around 1/27/2018). Upcoming Health Maintenance Date Due Pneumococcal 19-64 Medium Risk (1 of 1 - PPSV23) 11/10/1988 DTaP/Tdap/Td series (1 - Tdap) 11/10/1990 PAP AKA CERVICAL CYTOLOGY 11/10/1990 Influenza Age 5 to Adult 8/1/2017 Allergies as of 11/27/2017  Review Complete On: 11/27/2017 By: Leah Pillai LPN Severity Noted Reaction Type Reactions Percocet [Oxycodone-acetaminophen] Medium 01/16/2013   Side Effect Nausea and Vomiting Advair Diskus [Fluticasone-salmeterol]  10/07/2015    Nausea and Vomiting Ibuprofen  11/14/2013    Nausea and Vomiting Tylenol [Acetaminophen]  09/27/2017    Other (comments) Causes upset stomach Current Immunizations  Never Reviewed No immunizations on file. Not reviewed this visit You Were Diagnosed With   
  
 Codes Comments Bulging lumbar disc    -  Primary ICD-10-CM: M51.26 
ICD-9-CM: 722.10 Lumbar facet arthropathy     ICD-10-CM: M12.88 ICD-9-CM: 721.3 Neuritis     ICD-10-CM: M79.2 ICD-9-CM: 729.2 Pain of left hip joint     ICD-10-CM: M25.552 ICD-9-CM: 719.45 Vitals BP Pulse Temp Resp Height(growth percentile) Weight(growth percentile) 124/78 90 97.6 °F (36.4 °C) (Oral) 18 5' 4\" (1.626 m) 154 lb (69.9 kg) LMP SpO2 BMI OB Status Smoking Status 01/01/2013 100% 26.43 kg/m2 Hysterectomy Current Every Day Smoker BMI and BSA Data Body Mass Index Body Surface Area  
 26.43 kg/m 2 1.78 m 2 Preferred Pharmacy Pharmacy Name Phone [id number] [place], [address] Your Updated Medication List  
  
   
 This list is accurate as of: 11/27/17  9:26 AM.  Always use your most recent med list.  
  
  
  
  
 * albuterol 1.25 mg/3 mL Nebu Commonly known as:  Jovanna Larach Take 3 mL by inhalation every four (4) hours as needed (wheezing). * albuterol 90 mcg/actuation inhaler Commonly known as:  PROVENTIL HFA, VENTOLIN HFA, PROAIR HFA Take 2 Puffs by inhalation every four (4) hours as needed for Wheezing. ALPRAZolam 0.5 mg tablet Commonly known as:  Morales Leaks Take 1 Tab by mouth nightly as needed for Anxiety. carisoprodol 250 mg tablet Commonly known as:  SOMA Take 250 mg by mouth as needed for Muscle Spasm(s). celecoxib 100 mg capsule Commonly known as:  CELEBREX Take  by mouth two (2) times a day. dicyclomine 10 mg capsule Commonly known as:  BENTYL  
take 1 capsule by mouth three times a day  
  
 famotidine 40 mg tablet Commonly known as:  PEPCID Take 40 mg by mouth daily. HYDROmorphone 2 mg tablet Commonly known as:  DILAUDID Take 1 - 2 tablets every 4 -6 hours as needed for pain  
  
 losartan 100 mg tablet Commonly known as:  COZAAR Take 100 mg by mouth daily. Nebulizer & Compressor machine DX: Bronchial Asthma Nebulizer Accessories Kit Use as directed NORVASC 5 mg tablet Generic drug:  amLODIPine Take 5 mg by mouth daily. promethazine-phenyleph-codeine 6.25-5-10 mg/5 mL Syrp Commonly known as:  PHENERGAN VC WITH CODEINE Take 5 mL by mouth every six (6) hours as needed. Max Daily Amount: 20 mL. SYMBICORT 160-4.5 mcg/actuation Hfaa Generic drug:  budesonide-formoterol  
inhale 2 puffs by mouth twice a day IN THE MORNING AND EVENING  
  
 VITAMIN D3 1,000 unit tablet Generic drug:  cholecalciferol Take 3,000 Units by mouth daily. ZyrTEC 10 mg tablet Generic drug:  cetirizine Take  by mouth daily as needed. * Notice:   This list has 2 medication(s) that are the same as other medications prescribed for you. Read the directions carefully, and ask your doctor or other care provider to review them with you. We Performed the Following REFERRAL TO ORTHOPEDICS [VUS330 Custom] Follow-up Instructions Return in about 2 months (around 1/27/2018). To-Do List   
 12/04/2017 Neurology:  EMG ONE EXTREMITY LOWER LT Referral Information Referral ID Referred By Referred To  
  
 [id number] Poncho LYN MD   
   [address] [place] [address] [facility], [address] Phone: [phone] Fax: [phone] Visits Status Start Date End Date 1 New Request 11/27/17 11/27/18 If your referral has a status of pending review or denied, additional information will be sent to support the outcome of this decision. Patient Instructions Electromyogram (EMG) and Nerve Conduction Studies: About These Tests What are they? An electromyogram (EMG) measures the electrical activity of your muscles when you are not using them (at rest) and when you tighten them (muscle contraction). Nerve conduction studies (NCS) measure how well and how fast the nerves can send electrical signals. EMG and nerve conduction studies are often done together. If they are done together, the nerve conduction studies are done before the EMG. Why are they done? You may need an EMG to find diseases that damage your muscles or nerves or to find why you cannot move your muscles (paralysis), why they feel weak, or why they twitch. You may need nerve conduction studies to find damage to the nerves that lead from the brain and spinal cord to the rest of the body (peripheral nervous system). Nerve conduction studies are often used to help find nerve disorders, such as carpal tunnel syndrome. How can you prepare for these tests?  
 · Tell your doctors ALL the medicines, vitamins, supplements, and herbal remedies you take. Some medicines can affect the test results. You may need to stop taking some medicines before you have this test.  
? · If you take aspirin or some other blood thinner, be sure to talk to your doctor. He or she will tell you if you should stop taking it before your test. Make sure that you understand exactly what your doctor wants you to do. ? · Wear loose-fitting clothing. You may be given a hospital gown to wear. ? · The electrodes for the test are attached to your skin. Your skin needs to be clean and free of sprays, oils, creams, and lotions. What happens during the tests? You lie on a table or bed or sit in a reclining chair so your muscles are relaxed. For an EMG: 
· Your doctor will insert a needle electrode into a muscle. This will record the electrical activity while the muscle is at rest. You may feel a quick, sharp pain when the needle electrode is put into a muscle. · Your doctor will ask you to tighten the same muscle slowly and steadily while the electrical activity is recorded. · Your doctor may move the electrode to a different area of the muscle or a different muscle. For nerve conduction studies: 
· Your doctor will attach two types of electrodes to your skin. ¨ One type of electrode is placed over a nerve and will give the nerve an electrical pulse. ¨ The other type of electrode is placed over the muscle that the nerve controls. It will record how long it takes the muscle to react to the electrical pulse. · You will be able to feel the electrical pulses. They are small shocks and are safe. What else should you know about these tests? · After an EMG, you may be sore and have a tingling feeling in your muscles for up to 2 days. You may have small bruises or swelling at the needle site. · For an EMG, you may be asked to sign a consent form.  Talk to your doctor about any concerns you have about the need for the test, its risks, how it will be done, or what the results will mean. How long do they take? · An EMG may take 30 to 60 minutes. · Nerve conduction tests may take from 15 minutes to 1 hour or more. It depends on how many nerves and muscles your doctor tests. What happens after these tests? · If any of the test areas are sore: ¨ Put ice or a cold pack on the area for 10 to 20 minutes at a time. Put a thin cloth between the ice and your skin. ¨ Take an over-the-counter pain medicine, such as acetaminophen (Tylenol), ibuprofen (Advil, Motrin), or naproxen (Aleve). Be safe with medicines. Read and follow all instructions on the label. · You will probably be able to go home right away. · You can go back to your usual activities right away. When should you call for help? Watch closely for changes in your health, and be sure to contact your doctor if: · Muscle pain from an EMG test gets worse or you have swelling, tenderness, or pus at any of the needle sites. · You have any problems that you think may be from the test. 
· You have any questions about the test or have not received your results. Follow-up care is a key part of your treatment and safety. Be sure to make and go to all appointments, and call your doctor if you are having problems. It's also a good idea to keep a list of the medicines you take. Ask your doctor when you can expect to have your test results. Where can you learn more? Go to http://fernando-chriss.info/. Enter M207 in the search box to learn more about \"Electromyogram (EMG) and Nerve Conduction Studies: About These Tests. \" Current as of: October 14, 2016 Content Version: 11.4 © 4494-1364 [employer]. Care instructions adapted under license by [employer] (which disclaims liability or warranty for this information).  If you have questions about a medical condition or this instruction, always ask your healthcare professional. [employer] disclaims any warranty or liability for your use of this information. Introducing [facility] & HEALTH [facility]! Dear Umu Second: Thank you for requesting a Giiv account. Our records indicate that you already have an active Giiv account. You can access your account anytime at [email or web address] Did you know that you can access your hospital and ER discharge instructions at any time in Giiv? You can also review all of your test results from your hospital stay or ER visit. Additional Information If you have questions, please visit the Frequently Asked Questions section of the Giiv website at [email or web address] Remember, Giiv is NOT to be used for urgent needs. For medical emergencies, dial 911. Now available from your iPhone and Android! Please provide this summary of care documentation to your next provider. Your primary care clinician is listed as Gavin Carpio. If you have any questions after today's visit, please call [phone].

## 2017-11-27 NOTE — PATIENT INSTRUCTIONS
Electromyogram (EMG) and Nerve Conduction Studies: About These Tests  What are they? An electromyogram (EMG) measures the electrical activity of your muscles when you are not using them (at rest) and when you tighten them (muscle contraction). Nerve conduction studies (NCS) measure how well and how fast the nerves can send electrical signals. EMG and nerve conduction studies are often done together. If they are done together, the nerve conduction studies are done before the EMG. Why are they done? You may need an EMG to find diseases that damage your muscles or nerves or to find why you cannot move your muscles (paralysis), why they feel weak, or why they twitch. You may need nerve conduction studies to find damage to the nerves that lead from the brain and spinal cord to the rest of the body (peripheral nervous system). Nerve conduction studies are often used to help find nerve disorders, such as carpal tunnel syndrome. How can you prepare for these tests? · Tell your doctors ALL the medicines, vitamins, supplements, and herbal remedies you take. Some medicines can affect the test results. You may need to stop taking some medicines before you have this test.   ? · If you take aspirin or some other blood thinner, be sure to talk to your doctor. He or she will tell you if you should stop taking it before your test. Make sure that you understand exactly what your doctor wants you to do. ? · Wear loose-fitting clothing. You may be given a hospital gown to wear. ? · The electrodes for the test are attached to your skin. Your skin needs to be clean and free of sprays, oils, creams, and lotions. What happens during the tests? You lie on a table or bed or sit in a reclining chair so your muscles are relaxed. For an EMG:  · Your doctor will insert a needle electrode into a muscle.  This will record the electrical activity while the muscle is at rest. You may feel a quick, sharp pain when the needle electrode is put into a muscle. · Your doctor will ask you to tighten the same muscle slowly and steadily while the electrical activity is recorded. · Your doctor may move the electrode to a different area of the muscle or a different muscle. For nerve conduction studies:  · Your doctor will attach two types of electrodes to your skin. ¨ One type of electrode is placed over a nerve and will give the nerve an electrical pulse. ¨ The other type of electrode is placed over the muscle that the nerve controls. It will record how long it takes the muscle to react to the electrical pulse. · You will be able to feel the electrical pulses. They are small shocks and are safe. What else should you know about these tests? · After an EMG, you may be sore and have a tingling feeling in your muscles for up to 2 days. You may have small bruises or swelling at the needle site. · For an EMG, you may be asked to sign a consent form. Talk to your doctor about any concerns you have about the need for the test, its risks, how it will be done, or what the results will mean. How long do they take? · An EMG may take 30 to 60 minutes. · Nerve conduction tests may take from 15 minutes to 1 hour or more. It depends on how many nerves and muscles your doctor tests. What happens after these tests? · If any of the test areas are sore:  ¨ Put ice or a cold pack on the area for 10 to 20 minutes at a time. Put a thin cloth between the ice and your skin. ¨ Take an over-the-counter pain medicine, such as acetaminophen (Tylenol), ibuprofen (Advil, Motrin), or naproxen (Aleve). Be safe with medicines. Read and follow all instructions on the label. · You will probably be able to go home right away. · You can go back to your usual activities right away. When should you call for help?   Watch closely for changes in your health, and be sure to contact your doctor if:  · Muscle pain from an EMG test gets worse or you have swelling, tenderness, or pus at any of the needle sites. · You have any problems that you think may be from the test.  · You have any questions about the test or have not received your results. Follow-up care is a key part of your treatment and safety. Be sure to make and go to all appointments, and call your doctor if you are having problems. It's also a good idea to keep a list of the medicines you take. Ask your doctor when you can expect to have your test results. Where can you learn more? Go to http://fernando-chriss.info/. Enter S644 in the search box to learn more about \"Electromyogram (EMG) and Nerve Conduction Studies: About These Tests. \"  Current as of: October 14, 2016  Content Version: 11.4  © 9118-8374 [employer]. Care instructions adapted under license by [employer] (which disclaims liability or warranty for this information). If you have questions about a medical condition or this instruction, always ask your healthcare professional. [address] any warranty or liability for your use of this information.

## 2017-11-27 NOTE — PROGRESS NOTES
[address]  [place], [address] [place] [address] Street  Phone: [phone]  Fax: [phone]  PROGRESS NOTE  Patient: Georgie Alston                MRN: [id number]       SSN: [id number]  YOB: 1970        AGE: 49 y.o. SEX: female  Body mass index is 26.43 kg/(m^2). PCP: Luz Marina Unger MD  11/27/17    Chief Complaint   Patient presents with    Back Pain     block follow up     She reports   HISTORY OF PRESENT ILLNESS:  Georgie Alston is a 49 y.o.  female with history of chronic low back, BLE, neck and LUE pain. Her neck pain radiates, to LUE from her neck to her anterior arm and to her entire hand. Her neck and LUE pain is very intermittent and comes and goes. Her LBP radiates into the LLE in the L4/5 distribution. Her LLE pain resolved after her two SNRBs, but has since come back in the last four weeks and is now radiating all the way down to her foot. She is also reporting Left anterior thigh numbness and pain, it is consistent with Left hip pain. She has pain with ROM of that left hip. Dr. Jennie Flower did a left hip bursa injection without benefit. Her RLE pain is resolved. Prior history of back and neck problems: recurrent self limited episodes of low back pain in the past, previous osteoarthritis of lumbar spine and previous herniated disc. Pain is aching, burning, numbing and tingling. Pain is worse with looking up, looking down, walking, riding in a car, lifting, pushing, pulling, and standing and affects sleep, work and recreational activities. She comes in today following a Right L5/S1 SNRB, with benefit. She reports that her RLE has improved a lot with the SNRB and the start of the Topamax. She reports continued LLE pain, it is radiating all the down to the foot. She also has left anterior thigh pain and numbness. She has been to PT and she has maxed out on steroid injections. She would like to avoid surgery.     States she has been using Topamax 75mg Daily with moderate, relief. . Denies bladder/bowel dysfunction, saddle paresthesia, weakness, gait disturbance, or other neurological deficits. Denies chills, fever,night sweats, unexplained weight loss/weight gain, chest pain, sob or anxiety. Reports no new medical issues or hospitalizations since the last visit. Pt at this time desires to  continue with current care/proceed with medication evaluation/proceed with evaluation of LBP and LLE pain. ASSESSMENT   Diagnoses and all orders for this visit:    1. Bulging lumbar disc    2. Lumbar facet arthropathy    3. Neuritis  -     EMG ONE EXTREMITY LOWER LT; Future    4. Pain of left hip joint  -     REFERRAL TO ORTHOPEDICS       IMPRESSION AND PLAN:  This is a pt with chronic LBP and BLE who is doing about the same since last OV. Her RLE pain, neck pain and LUE have almost completely resolved. She reports increased LLE pain over the last four weeks, she denies any injury or weakness. She also has left hip and anterior thigh pain. She denies any SEs of the Topamax. 1) Pt was given information on EMG   2) Taper up on Topamax to 75mg BID,   3) LLE EMG  4) Ortho for left hip pain  4) Ms. Fernanda Collins has a reminder for a \"due or due soon\" health maintenance. I have asked that she contact her primary care provider, Omer Salazar MD, for follow-up on this health maintenance. 5) We have informed patient to notify us for immediate appointment if he has any worsening neurogical symptoms or if an emergency situation presents, then call 911  6)  has been reviewed and is appropriate  7) Pt will follow-up in 2-3 months after EMG w/ me.     Subjective    Work [employer]    Smoking Status Smoker    Pain Scale: 6/10    Pain Assessment  11/27/2017   Location of Pain -   Severity of Pain 6   Quality of Pain Aching   Quality of Pain Comment numbness, tingling, burning, weakness   Duration of Pain -   Frequency of Pain Constant   Aggravating Factors (No Data)   Aggravating Factors Comment Lying down to long   Relieving Factors (No Data)   Relieving Factors Comment Constant Movement   Result of Injury -         REVIEW OF SYSTEMS  Constitutional: Negative for fever, chills, or weight change. Respiratory: Negative for cough or shortness of breath. Cardiovascular: Negative for chest pain or palpitations. Gastrointestinal: Negative for incontinence, acid reflux, change in bowel habits, or constipation. Genitourinary: Negative for incontinence, dysuria and flank pain. Musculoskeletal: Positive for LBP and LLE pain. See HPI. Skin: Negative for rash. Neurological:LLE L5 and left anterior thigh pain. See HPI. Endo/Heme/Allergies: Negative. Psychiatric/Behavioral: Negative. PHYSICAL EXAMINATION  Visit Vitals    /78    Pulse 90    Temp 97.6 °F (36.4 °C) (Oral)    Resp 18    Ht 5' 4\" (1.626 m)    Wt 154 lb (69.9 kg)    LMP 01/01/2013    SpO2 100%    BMI 26.43 kg/m2         Accompanied by self. Constitutional:  Well developed, well nourished, in no acute distress. Psychiatric: Affect and mood are appropriate. Integumentary: No rashes or abrasions noted on exposed areas. Cardiovascular/Peripheral Vascular: +2 radial & pedal pulses. No peripheral edema is noted. Lymphatic:  No evidence of lymphedema. No cervical lymphadenopathy. SPINE/MUSCULOSKELETAL EXAM     Lumbar spine:  No rash, ecchymosis, or gross obliquity. No fasciculations. No focal atrophy is noted. Range of motion is decreased and pain with extension, turning left. Tenderness to palpation left sciatic notch. SI joints non-tender. Trochanters non tender. Straight leg raise negative  Hip Impingement positive on left    Sensation grossly intact to light touch. MOTOR:     Hip Flex Quads Hamstrings Ankle DF EHL Ankle PF   Right 5/5 5/5 5/5 5/5 5/5 5/5   Left 5/5 5/5 5/5 5/5 5/5 5/5       Ambulation without assistive device. FWB.     Favors LLE, improves with time        PAST MEDICAL HISTORY   Past Medical History:   Diagnosis Date    Anxiety     Arthritis     Asthma     pt states she does not have asthma    Chronic pain     Chronic Back Pain     Depression     Gastrointestinal disorder     GERD (gastroesophageal reflux disease)     well controlled w/ meds    Hypertension     no medications currently; off [id number]    IBS (irritable bowel syndrome)     Other ill-defined conditions(799.89)     bulging disc    Other ill-defined conditions(799.89)     sinusitis    Tattoo     TATTOO       Past Surgical History:   Procedure Laterality Date    ENDOSCOPY, COLON, DIAGNOSTIC      HX BREAST BIOPSY      left    HX CHOLECYSTECTOMY  2016    HX CYST INCISION AND DRAINAGE      left    HX GYN  2011    cysts removed from both ovaries    HX HYSTERECTOMY      partial     LAP,CHOLECYSTECTOMY  2-25-16    Dr. Ronal Glasgow   . MEDICATIONS      Current Outpatient Prescriptions   Medication Sig Dispense Refill    albuterol (ACCUNEB) 1.25 mg/3 mL nebu Take 3 mL by inhalation every four (4) hours as needed (wheezing). 25 Each 0    albuterol (PROVENTIL HFA, VENTOLIN HFA, PROAIR HFA) 90 mcg/actuation inhaler Take 2 Puffs by inhalation every four (4) hours as needed for Wheezing. 1 Inhaler 0    promethazine-phenyleph-codeine (PHENERGAN VC WITH CODEINE) 6.25-5-10 mg/5 mL syrp Take 5 mL by mouth every six (6) hours as needed. Max Daily Amount: 20 mL. 120 mL 0    Nebulizer & Compressor machine DX: Bronchial Asthma 1 Each 0    Nebulizer Accessories kit Use as directed 1 Kit 0    SYMBICORT 160-4.5 mcg/actuation HFA inhaler inhale 2 puffs by mouth twice a day IN THE MORNING AND EVENING  0    dicyclomine (BENTYL) 10 mg capsule take 1 capsule by mouth three times a day  0    HYDROmorphone (DILAUDID) 2 mg tablet Take 1 - 2 tablets every 4 -6 hours as needed for pain 40 Tab 0    carisoprodol (SOMA) 250 mg tablet Take 250 mg by mouth as needed for Muscle Spasm(s).       famotidine (PEPCID) 40 mg tablet Take 40 mg by mouth daily.  celecoxib (CELEBREX) 100 mg capsule Take  by mouth two (2) times a day.  losartan (COZAAR) 100 mg tablet Take 100 mg by mouth daily.  amLODIPine (NORVASC) 5 mg tablet Take 5 mg by mouth daily.  ALPRAZolam (XANAX) 0.5 mg tablet Take 1 Tab by mouth nightly as needed for Anxiety. 7 Tab 0    cholecalciferol (VITAMIN D3) 1,000 unit tablet Take 3,000 Units by mouth daily.  cetirizine (ZYRTEC) 10 mg tablet Take  by mouth daily as needed. ALLERGIES    Allergies   Allergen Reactions    Percocet [Oxycodone-Acetaminophen] Nausea and Vomiting    Advair Diskus [Fluticasone-Salmeterol] Nausea and Vomiting    Ibuprofen Nausea and Vomiting    Tylenol [Acetaminophen] Other (comments)     Causes upset stomach          SOCIAL HISTORY    Social History     Social History    Marital status:      Spouse name: N/A    Number of children: N/A    Years of education: N/A     Occupational History    Not on file.      Social History Main Topics    Smoking status: Current Every Day Smoker     Packs/day: 0.00     Last attempt to quit: 10/1/2010    Smokeless tobacco: Never Used    Alcohol use No    Drug use: No    Sexual activity: Not on file     Other Topics Concern    Not on file     Social History Narrative       FAMILY HISTORY    Family History   Problem Relation Age of Onset    Breast Cancer Mother     Coronary Artery Disease Other     Breast Cancer Sister          Magy Wright NP

## 2017-11-30 ENCOUNTER — TELEPHONE (OUTPATIENT)
Dept: ORTHOPEDIC SURGERY | Age: 47
End: 2017-11-30

## 2017-11-30 NOTE — TELEPHONE ENCOUNTER
EMG scheduled with Dr. Jan Young for 01-17-18 @ 11:00am @ [facility] office  ,I left a message for patient on voice mail

## 2017-11-30 NOTE — TELEPHONE ENCOUNTER
Left message for patient RE: appt scheduled for Dr. Monique Norman for 01-12-18 to arrive @ 10:00am @ [address] [place] [address]

## 2017-12-11 ENCOUNTER — TELEPHONE (OUTPATIENT)
Dept: ORTHOPEDIC SURGERY | Age: 47
End: 2017-12-11

## 2018-01-25 ENCOUNTER — TELEPHONE (OUTPATIENT)
Dept: ORTHOPEDIC SURGERY | Age: 48
End: 2018-01-25

## 2018-01-25 ENCOUNTER — OFFICE VISIT (OUTPATIENT)
Dept: ORTHOPEDIC SURGERY | Age: 48
End: 2018-01-25

## 2018-01-25 VITALS
OXYGEN SATURATION: 97 % | HEART RATE: 104 BPM | TEMPERATURE: 97.9 F | BODY MASS INDEX: 25.33 KG/M2 | HEIGHT: 64 IN | DIASTOLIC BLOOD PRESSURE: 81 MMHG | WEIGHT: 148.4 LBS | SYSTOLIC BLOOD PRESSURE: 123 MMHG | RESPIRATION RATE: 17 BRPM

## 2018-01-25 DIAGNOSIS — M21.372 LEFT FOOT DROP: ICD-10-CM

## 2018-01-25 DIAGNOSIS — M79.2 NEURITIS: Primary | ICD-10-CM

## 2018-01-25 DIAGNOSIS — M51.36 BULGING LUMBAR DISC: ICD-10-CM

## 2018-01-25 DIAGNOSIS — M25.552 PAIN OF LEFT HIP JOINT: ICD-10-CM

## 2018-01-25 RX ORDER — MONTELUKAST SODIUM 10 MG/1
TABLET ORAL
Refills: 3 | COMMUNITY
Start: 2017-12-27

## 2018-01-25 RX ORDER — CITALOPRAM 10 MG/1
TABLET ORAL
Refills: 5 | COMMUNITY
Start: 2017-12-19

## 2018-01-25 RX ORDER — BACLOFEN 10 MG/1
10 TABLET ORAL
Qty: 60 TAB | Refills: 1 | Status: SHIPPED | OUTPATIENT
Start: 2018-01-25

## 2018-01-25 RX ORDER — HYDROCODONE BITARTRATE AND ACETAMINOPHEN 7.5; 325 MG/1; MG/1
1 TABLET ORAL
Qty: 28 TAB | Refills: 0 | Status: SHIPPED | OUTPATIENT
Start: 2018-01-25

## 2018-01-25 NOTE — PROGRESS NOTES
[address]  [place], [address] [place] [address] Street  Phone: [phone]  Fax: [phone]  PROGRESS NOTE  Patient: Carlene Lanier                MRN: [id number]       SSN: [id number]  YOB: 1970        AGE: 49 y.o. SEX: female  Body mass index is 25.47 kg/(m^2). PCP: Symone Garcia MD  01/25/18    Chief Complaint   Patient presents with    Neck Pain     FU    Back Pain    Hip Pain    Leg Pain       HISTORY OF PRESENT ILLNESS:  Carmelina Hong is a 49 y.o.  female with history of chronic low back, BLE, neck and LUE pain. Her neck pain radiates, to LUE from her neck to her anterior arm and to her entire hand.  Her neck and LUE pain is very intermittent and comes and goes.  Her LBP radiates into the LLE in the L4/5 distribution. Her LLE pain resolved after her two SNRBs, but has since come back in the last three months and is now radiating all the way down to her foot. She has also been having Left anterior thigh numbness and hip pain in which I referred her to ortho for, they have determined that she has arthritis and needs surgery for it.  Prior history of back and neck problems: recurrent self limited episodes of low back pain in the past, previous osteoarthritis of lumbar spine and previous herniated disc. Pain is aching, burning, numbing and tingling. Pain is worse with looking up, looking down, walking, riding in a car, lifting, pushing, pulling, and standing and affects sleep, work and recreational activities. She comes in today with new onset Left foot drop since last exam it is a 3/5 at rest and is not functional.  Her LLE EMG was Negative and recommended a LS MRI. She has a known disc protrusion on the left at L4. I am going to order a STAT LS MRI to see what is going on with her Left foot drop and have her f/o with Dr Yandel Vo. She reports that her foot drop is due to her hip, I do not believe this is the case.  She cannot remember when this started. She denies any injury since last OV. She denies any other neurological deficit other than pain, including saddle paresthesia, bladder/bowel dysfunction. She reports that her RLE has improved a lot with the SNRB and the start of the Topamax. She reports BUE hand numbness consistent with carpal tunnel, she has no radiating arm pain and denies dexterity issues. States she has been using Topamax 75mg BID with minimal, relief. . Denies bladder/bowel dysfunction, saddle paresthesia, weakness, gait disturbance, or other neurological deficits. Denies chills, fever,night sweats, unexplained weight loss/weight gain, chest pain, sob or anxiety. Reports no new medical issues or hospitalizations since the last visit. Pt at this time desires to  continue with current care/proceed with medication evaluation/proceed with evaluation of neck and LBP and LLE pain. ASSESSMENT   Diagnoses and all orders for this visit:    1. Neuritis  -     HYDROcodone-acetaminophen (NORCO) 7.5-325 mg per tablet; Take 1 Tab by mouth every six (6) hours as needed for Pain (for severe pain). Max Daily Amount: 4 Tabs. 2. Pain of left hip joint    3. Bulging lumbar disc  -     MRI LUMB SPINE WO CONT; Future  -     HYDROcodone-acetaminophen (NORCO) 7.5-325 mg per tablet; Take 1 Tab by mouth every six (6) hours as needed for Pain (for severe pain). Max Daily Amount: 4 Tabs. 4. Left foot drop  -     MRI LUMB SPINE WO CONT; Future    Other orders  -     baclofen (LIORESAL) 10 mg tablet; Take 1 Tab by mouth three (3) times daily as needed. IMPRESSION AND PLAN:  This is a pt with chronic LBP and LLE L4/5 radicular pain who has a known Left-sided disc protrusion at L4 who is doing has significantly worsened since last OV. She has a new onset foot drop at rest, it is not functional. I have ordered a STAT LS MRI and f/o with Dr. Soriano Certain.      1) Pt was given information on back urgent symptoms   2) STAT LS MRI  3) Continue Topamax  4) 1 week of Norco  5) Trial of Baclofen  6) Ms. Arron Earl has a reminder for a \"due or due soon\" health maintenance. I have asked that she contact her primary care provider, Sunni Maravilla MD, for follow-up on this health maintenance. 7) We have informed patient to notify us for immediate appointment if he has any worsening neurogical symptoms or if an emergency situation presents, then call 911  8)  has been reviewed and is appropriate  9) Pt will follow-up in next available w/ Dr. Jany Love after MRI. Subjective    Work Desk work    Smoking Status Smoker, trying to quit    Pain Scale: 8/10    Pain Assessment  1/25/2018   Location of Pain Neck;Back;Hip;Leg   Severity of Pain 8   Quality of Pain Aching   Quality of Pain Comment -   Duration of Pain -   Frequency of Pain Constant   Aggravating Factors Walking   Aggravating Factors Comment -   Relieving Factors (No Data)   Relieving Factors Comment Pain Medication   Result of Injury -         REVIEW OF SYSTEMS  Constitutional: Negative for fever, chills, or weight change. Respiratory: Negative for cough or shortness of breath. Cardiovascular: Negative for chest pain or palpitations. Gastrointestinal: Negative for incontinence, acid reflux, change in bowel habits, or constipation. Genitourinary: Negative for incontinence, dysuria and flank pain. Musculoskeletal: Positive for Neck, LBP, Left Hip, and LLE pain. See HPI. Skin: Negative for rash. Neurological: LLE L4/5  radiculopathy. See HPI. Endo/Heme/Allergies: Negative. Psychiatric/Behavioral: Negative. PHYSICAL EXAMINATION  Visit Vitals    /81    Pulse (!) 104    Temp 97.9 °F (36.6 °C) (Oral)    Resp 17    Ht 5' 4\" (1.626 m)    Wt 148 lb 6.4 oz (67.3 kg)    LMP 01/01/2013    SpO2 97%    BMI 25.47 kg/m2         Accompanied by Self. Constitutional:  Well developed, well nourished, in no acute distress. Psychiatric: Affect and mood are appropriate.    Integumentary: No rashes or abrasions noted on exposed areas. Cardiovascular/Peripheral Vascular: +2 radial & pedal pulses. No peripheral edema is noted. Lymphatic:  No evidence of lymphedema. No cervical lymphadenopathy. SPINE/MUSCULOSKELETAL EXAM  Cervical spine:  Neck is midline. Normal muscle tone. No focal atrophy is noted. Neck ROM discomfort with extension. Shoulder ROM intact. Tenderness to palpation bilateral trap. Negative Spurling's sign. Negative Tinel's sign. Negative Bingham's sign. Sensation grossly intact to light touch. Lumbar spine:  No rash, ecchymosis, or gross obliquity. No fasciculations. No focal atrophy is noted. Range of motion is decreased and pain with turning left. Tenderness to palpation left sciatic notch. SI joints non-tender. Trochanters non tender. Straight leg raise positive on left  Hip Impingement positive on left    Sensation grossly intact to light touch. MOTOR:     Biceps Triceps Deltoids Wrist Ext Wrist Flex Hand Intrin   Right 5/5 5/5 5/5 5/5 5/5 5/5   Left 5/5 5/5 5/5 5/5 5/5 5/5        Hip Flex Quads Hamstrings Ankle DF EHL Ankle PF   Right 5/5 5/5 5/5 5/5 5/5 5/5   Left 5/5 5/5 5/5 3/5 4/5 3/5       Ambulation without assistive device. FWB.     Left foot drop and antalgic gait        PAST MEDICAL HISTORY   Past Medical History:   Diagnosis Date    Anxiety     Arthritis     Asthma     pt states she does not have asthma    Chronic pain     Chronic Back Pain     Depression     Gastrointestinal disorder     GERD (gastroesophageal reflux disease)     well controlled w/ meds    Hypertension     no medications currently; off [id number]    IBS (irritable bowel syndrome)     Other ill-defined conditions(799.89)     bulging disc    Other ill-defined conditions(799.89)     sinusitis    Tattoo     TATTOO       Past Surgical History:   Procedure Laterality Date    ENDOSCOPY, COLON, DIAGNOSTIC      HX BREAST BIOPSY      left    HX CHOLECYSTECTOMY  2016    HX CYST INCISION AND DRAINAGE      left    HX GYN  2011    cysts removed from both ovaries    HX HYSTERECTOMY      partial     LAP,CHOLECYSTECTOMY  2-25-16    Dr. Marbella Fatima MEDICATIONS      Current Outpatient Prescriptions   Medication Sig Dispense Refill    citalopram (CELEXA) 10 mg tablet TAKE 1 TABLET BY ORAL ROUTE EVERY DAY  5    montelukast (SINGULAIR) 10 mg tablet TAKE 1 TABLET BY ORAL ROUTE EVERY DAY IN THE EVENING  3    baclofen (LIORESAL) 10 mg tablet Take 1 Tab by mouth three (3) times daily as needed. 60 Tab 1    HYDROcodone-acetaminophen (NORCO) 7.5-325 mg per tablet Take 1 Tab by mouth every six (6) hours as needed for Pain (for severe pain). Max Daily Amount: 4 Tabs. 28 Tab 0    topiramate (TOPAMAX) 25 mg tablet Take 3 Tabs by mouth two (2) times daily (with meals). 180 Tab 1    albuterol (ACCUNEB) 1.25 mg/3 mL nebu Take 3 mL by inhalation every four (4) hours as needed (wheezing). 25 Each 0    albuterol (PROVENTIL HFA, VENTOLIN HFA, PROAIR HFA) 90 mcg/actuation inhaler Take 2 Puffs by inhalation every four (4) hours as needed for Wheezing. 1 Inhaler 0    Nebulizer & Compressor machine DX: Bronchial Asthma 1 Each 0    Nebulizer Accessories kit Use as directed 1 Kit 0    SYMBICORT 160-4.5 mcg/actuation HFA inhaler inhale 2 puffs by mouth twice a day IN THE MORNING AND EVENING  0    dicyclomine (BENTYL) 10 mg capsule take 1 capsule by mouth three times a day  0    famotidine (PEPCID) 40 mg tablet Take 40 mg by mouth daily.  celecoxib (CELEBREX) 100 mg capsule Take  by mouth two (2) times a day.  losartan (COZAAR) 100 mg tablet Take 100 mg by mouth daily.  amLODIPine (NORVASC) 5 mg tablet Take 5 mg by mouth daily.  ALPRAZolam (XANAX) 0.5 mg tablet Take 1 Tab by mouth nightly as needed for Anxiety. 7 Tab 0    cholecalciferol (VITAMIN D3) 1,000 unit tablet Take 3,000 Units by mouth daily.  cetirizine (ZYRTEC) 10 mg tablet Take  by mouth daily as needed. ALLERGIES    Allergies   Allergen Reactions    Percocet [Oxycodone-Acetaminophen] Nausea and Vomiting    Advair Diskus [Fluticasone-Salmeterol] Nausea and Vomiting    Ibuprofen Nausea and Vomiting    Tylenol [Acetaminophen] Other (comments)     Causes upset stomach          SOCIAL HISTORY    Social History     Social History    Marital status:      Spouse name: N/A    Number of children: N/A    Years of education: N/A     Occupational History    Not on file.      Social History Main Topics    Smoking status: Current Every Day Smoker     Packs/day: 0.00     Last attempt to quit: 10/1/2010    Smokeless tobacco: Never Used    Alcohol use No    Drug use: No    Sexual activity: Not on file     Other Topics Concern    Not on file     Social History Narrative       FAMILY HISTORY    Family History   Problem Relation Age of Onset    Breast Cancer Mother     Coronary Artery Disease Other     Breast Cancer Sister          Renetta Gant NP

## 2018-01-25 NOTE — TELEPHONE ENCOUNTER
No auth required for STAT Mri L-spine scheduled for 01-26-18 @ [facility] reference # [id number] per Lynn Wilson from [place], notified [employer] scheduling.

## 2018-01-25 NOTE — PATIENT INSTRUCTIONS
Sciatica: Care Instructions  Your Care Instructions    Sciatica (say \"fwr-QX-sp-kuh\") is an irritation of one of the sciatic nerves, which come from the spinal cord in the lower back. The sciatic nerves and their branches extend down through the buttock to the foot. Sciatica can develop when an injured disc in the back presses against a spinal nerve root. Its main symptom is pain, numbness, or weakness that is often worse in the leg or foot than in the back. Sciatica often will improve and go away with time. Early treatment usually includes medicines and exercises to relieve pain. Follow-up care is a key part of your treatment and safety. Be sure to make and go to all appointments, and call your doctor if you are having problems. It's also a good idea to know your test results and keep a list of the medicines you take. How can you care for yourself at home? · Take pain medicines exactly as directed. ¨ If the doctor gave you a prescription medicine for pain, take it as prescribed. ¨ If you are not taking a prescription pain medicine, ask your doctor if you can take an over-the-counter medicine. · Use heat or ice to relieve pain. ¨ To apply heat, put a warm water bottle, heating pad set on low, or warm cloth on your back. Do not go to sleep with a heating pad on your skin. ¨ To use ice, put ice or a cold pack on the area for 10 to 20 minutes at a time. Put a thin cloth between the ice and your skin. · Avoid sitting if possible, unless it feels better than standing. · Alternate lying down with short walks. Increase your walking distance as you are able to without making your symptoms worse. · Do not do anything that makes your symptoms worse. When should you call for help? Call 911 anytime you think you may need emergency care. For example, call if:  · You are unable to move a leg at all.   Call your doctor now or seek immediate medical care if:  · You have new or worse symptoms in your legs or buttocks. Symptoms may include:  ¨ Numbness or tingling. ¨ Weakness. ¨ Pain. · You lose bladder or bowel control. Watch closely for changes in your health, and be sure to contact your doctor if:  · You are not getting better as expected. Where can you learn more? Go to http://fernando-chriss.info/. Enter [phone] in the search box to learn more about \"Sciatica: Care Instructions. \"  Current as of: March 21, 2017  Content Version: 11.4  © 7195-5005 [employer]. Care instructions adapted under license by [employer] (which disclaims liability or warranty for this information). If you have questions about a medical condition or this instruction, always ask your healthcare professional. [address] any warranty or liability for your use of this information. Back Pain, Emergency or Urgent Symptoms: Care Instructions  Your Care Instructions    Many people have back pain at one time or another. In most cases, pain gets better with self-care that includes over-the-counter pain medicine, ice, heat, and exercises. Unless you have symptoms of a severe injury or heart attack, you may be able to give yourself a few days before you call a doctor. But some back problems are very serious. Do not ignore symptoms that need to be checked right away. Follow-up care is a key part of your treatment and safety. Be sure to make and go to all appointments, and call your doctor if you are having problems. It's also a good idea to know your test results and keep a list of the medicines you take. How can you care for yourself at home? · Sit or lie in positions that are most comfortable and that reduce your pain. Try one of these positions when you lie down:  ¨ Lie on your back with your knees bent and supported by large pillows. ¨ Lie on the floor with your legs on the seat of a sofa or chair.   Jenae Hoof on your side with your knees and hips bent and a pillow between your legs.  Shorty Roys on your stomach if it does not make pain worse. · Do not sit up in bed, and avoid soft couches and twisted positions. Bed rest can help relieve pain at first, but it delays healing. Avoid bed rest after the first day. · Change positions every 30 minutes. If you must sit for long periods of time, take breaks from sitting. Get up and walk around, or lie flat. · Try using a heating pad on a low or medium setting, for 15 to 20 minutes every 2 or 3 hours. Try a warm shower in place of one session with the heating pad. You can also buy single-use heat wraps that last up to 8 hours. You can also try ice or cold packs on your back for 10 to 20 minutes at a time, several times a day. (Put a thin cloth between the ice pack and your skin.) This reduces pain and makes it easier to be active and exercise. · Take pain medicines exactly as directed. ¨ If the doctor gave you a prescription medicine for pain, take it as prescribed. ¨ If you are not taking a prescription pain medicine, ask your doctor if you can take an over-the-counter medicine. When should you call for help? Call 911 anytime you think you may need emergency care. For example, call if:  ? · You are unable to move a leg at all. ? · You have back pain with severe belly pain. ? · You have symptoms of a heart attack. These may include:  ¨ Chest pain or pressure, or a strange feeling in the chest.  ¨ Sweating. ¨ Shortness of breath. ¨ Nausea or vomiting. ¨ Pain, pressure, or a strange feeling in the back, neck, jaw, or upper belly or in one or both shoulders or arms. ¨ Lightheadedness or sudden weakness. ¨ A fast or irregular heartbeat. After you call 911, the  may tell you to chew 1 adult-strength or 2 to 4 low-dose aspirin. Wait for an ambulance. Do not try to drive yourself. ?Call your doctor now or seek immediate medical care if:  ? · You have new or worse symptoms in your arms, legs, chest, belly, or buttocks.  Symptoms may include:  ¨ Numbness or tingling. ¨ Weakness. ¨ Pain. ? · You lose bladder or bowel control. ? · You have back pain and:  ¨ You have injured your back while lifting or doing some other activity. Call if the pain is severe, has not gone away after 1 or 2 days, and you cannot do your normal daily activities. ¨ You have had a back injury before that needed treatment. ¨ Your pain has lasted longer than 4 weeks. ¨ You have had weight loss you cannot explain. ¨ You are age 48 or older. ¨ You have cancer now or have had it before. ? Watch closely for changes in your health, and be sure to contact your doctor if you are not getting better as expected. Where can you learn more? Go to http://fernando-chriss.info/. Enter A209 in the search box to learn more about \"Back Pain, Emergency or Urgent Symptoms: Care Instructions. \"  Current as of: March 20, 2017  Content Version: 11.4  © 5504-5610 [employer]. Care instructions adapted under license by [employer] (which disclaims liability or warranty for this information). If you have questions about a medical condition or this instruction, always ask your healthcare professional. [address] any warranty or liability for your use of this information.

## 2018-01-25 NOTE — MR AVS SNAPSHOT
[address] [place] [place] [address] [address] [facility] [address] 
[phone] Patient: Marisel Hsieh MRN: [id number] :1970 Visit Information Date & Time Provider Department Dept. Phone Encounter #  
 2018  3:00 PM Luis Antonio Koenig NP [facility] [phone] [id number] Follow-up Instructions Follow-up and Disposition History Upcoming Health Maintenance Date Due Pneumococcal 19-64 Medium Risk (1 of 1 - PPSV23) 11/10/1988 DTaP/Tdap/Td series (1 - Tdap) 11/10/1990 PAP AKA CERVICAL CYTOLOGY 11/10/1990 Influenza Age 5 to Adult 2017 Allergies as of 2018  Review Complete On: 2018 By: Luis Antonio Koenig NP Severity Noted Reaction Type Reactions Percocet [Oxycodone-acetaminophen] Medium 2013   Side Effect Nausea and Vomiting Advair Diskus [Fluticasone-salmeterol]  10/07/2015    Nausea and Vomiting Ibuprofen  2013    Nausea and Vomiting Tylenol [Acetaminophen]  2017    Other (comments) Causes upset stomach Current Immunizations  Never Reviewed No immunizations on file. Not reviewed this visit You Were Diagnosed With   
  
 Codes Comments Neuritis    -  Primary ICD-10-CM: M79.2 ICD-9-CM: 729.2 Pain of left hip joint     ICD-10-CM: M25.552 ICD-9-CM: 719.45 Bulging lumbar disc     ICD-10-CM: M51.26 
ICD-9-CM: 722.10 Left foot drop     ICD-10-CM: R49.677 ICD-9-CM: 736.79 Vitals BP Pulse Temp Resp Height(growth percentile) Weight(growth percentile) 123/81 (!) 104 97.9 °F (36.6 °C) (Oral) 17 5' 4\" (1.626 m) 148 lb 6.4 oz (67.3 kg) LMP SpO2 BMI OB Status Smoking Status 2013 97% 25.47 kg/m2 Hysterectomy Current Every Day Smoker BMI and BSA Data Body Mass Index Body Surface Area  
 25.47 kg/m 2 1.74 m 2 Preferred Pharmacy Pharmacy Name Phone [facility][id number]- [place] [address] [place], [address] Your Updated Medication List  
  
   
This list is accurate as of: 1/25/18  4:29 PM.  Always use your most recent med list.  
  
  
  
  
 * albuterol 1.25 mg/3 mL Nebu Commonly known as:  Ty Lujan Take 3 mL by inhalation every four (4) hours as needed (wheezing). * albuterol 90 mcg/actuation inhaler Commonly known as:  PROVENTIL HFA, VENTOLIN HFA, PROAIR HFA Take 2 Puffs by inhalation every four (4) hours as needed for Wheezing. ALPRAZolam 0.5 mg tablet Commonly known as:  Anais Bull Take 1 Tab by mouth nightly as needed for Anxiety. baclofen 10 mg tablet Commonly known as:  LIORESAL Take 1 Tab by mouth three (3) times daily as needed. celecoxib 100 mg capsule Commonly known as:  CELEBREX Take  by mouth two (2) times a day. citalopram 10 mg tablet Commonly known as:  CELEXA  
TAKE 1 TABLET BY ORAL ROUTE EVERY DAY  
  
 dicyclomine 10 mg capsule Commonly known as:  BENTYL  
take 1 capsule by mouth three times a day  
  
 famotidine 40 mg tablet Commonly known as:  PEPCID Take 40 mg by mouth daily. HYDROcodone-acetaminophen 7.5-325 mg per tablet Commonly known as:  Mertie Vieques Take 1 Tab by mouth every six (6) hours as needed for Pain (for severe pain). Max Daily Amount: 4 Tabs. losartan 100 mg tablet Commonly known as:  COZAAR Take 100 mg by mouth daily. montelukast 10 mg tablet Commonly known as:  SINGULAIR  
TAKE 1 TABLET BY ORAL ROUTE EVERY DAY IN THE EVENING Nebulizer & Compressor machine DX: Bronchial Asthma Nebulizer Accessories Kit Use as directed NORVASC 5 mg tablet Generic drug:  amLODIPine Take 5 mg by mouth daily. SYMBICORT 160-4.5 mcg/actuation Hfaa Generic drug:  budesonide-formoterol  
inhale 2 puffs by mouth twice a day IN THE MORNING AND EVENING  
  
 topiramate 25 mg tablet Commonly known as:  TOPAMAX Take 3 Tabs by mouth two (2) times daily (with meals). VITAMIN D3 1,000 unit tablet Generic drug:  cholecalciferol Take 3,000 Units by mouth daily. ZyrTEC 10 mg tablet Generic drug:  cetirizine Take  by mouth daily as needed. * Notice: This list has 2 medication(s) that are the same as other medications prescribed for you. Read the directions carefully, and ask your doctor or other care provider to review them with you. Prescriptions Printed Refills HYDROcodone-acetaminophen (NORCO) 7.5-325 mg per tablet 0 Sig: Take 1 Tab by mouth every six (6) hours as needed for Pain (for severe pain). Max Daily Amount: 4 Tabs. Class: Print Route: Oral  
  
Prescriptions Sent to Pharmacy Refills  
 baclofen (LIORESAL) 10 mg tablet 1 Sig: Take 1 Tab by mouth three (3) times daily as needed. Class: Normal  
 Pharmacy: [facility]/pharmacy #[id number]- [place], [address] [place] [address] [address] [facility] #: [phone] Route: Oral  
  
To-Do List   
 01/26/2018 8:15 AM  
  Appointment with THE [facility] RM 1 at THE [facility] ([phone]) GENERAL INSTRUCTIONS  Bring information (ID card) if you have any medically implanted devices. You will be required to lie still while the procedure is being performed. Remove any jewelry (including body piercing, hairpins) prior to MRI. If you have had a creatinine level drawn within the past 30 days, please bring most recent results to your appt. Bring any films, CD's, and reports related to your study with you on the day of your exam.  This only includes studies done outside of [address] [place] [address], [facility], [place], and [facility]. Bring a complete list of all medications you are currently taking to include prescriptions, over-the-counter meds, herbals, vitamins & any dietary supplements. If you were given medications for claustrophobia or anxiety, please arrange to have someone drive you to your appointment.   QUESTIONS Notify the MRI Department if you have any questions concerning your study. [address] [phone]  
  
 02/01/2018 Imaging:  MRI LUMB SPINE WO CONT Referral Information Referral ID Referred By Referred To  
  
 [id number] Christie Montano Not Available Visits Status Start Date End Date 1 New Request 1/25/18 1/25/19 If your referral has a status of pending review or denied, additional information will be sent to support the outcome of this decision. Patient Instructions Sciatica: Care Instructions Your Care Instructions Sciatica (say \"vof-GW-gy-kuh\") is an irritation of one of the sciatic nerves, which come from the spinal cord in the lower back. The sciatic nerves and their branches extend down through the buttock to the foot. Sciatica can develop when an injured disc in the back presses against a spinal nerve root. Its main symptom is pain, numbness, or weakness that is often worse in the leg or foot than in the back. Sciatica often will improve and go away with time. Early treatment usually includes medicines and exercises to relieve pain. Follow-up care is a key part of your treatment and safety. Be sure to make and go to all appointments, and call your doctor if you are having problems. It's also a good idea to know your test results and keep a list of the medicines you take. How can you care for yourself at home? · Take pain medicines exactly as directed. ¨ If the doctor gave you a prescription medicine for pain, take it as prescribed. ¨ If you are not taking a prescription pain medicine, ask your doctor if you can take an over-the-counter medicine. · Use heat or ice to relieve pain. ¨ To apply heat, put a warm water bottle, heating pad set on low, or warm cloth on your back. Do not go to sleep with a heating pad on your skin. ¨ To use ice, put ice or a cold pack on the area for 10 to 20 minutes at a time. Put a thin cloth between the ice and your skin. · Avoid sitting if possible, unless it feels better than standing. · Alternate lying down with short walks. Increase your walking distance as you are able to without making your symptoms worse. · Do not do anything that makes your symptoms worse. When should you call for help? Call 911 anytime you think you may need emergency care. For example, call if: 
· You are unable to move a leg at all. Call your doctor now or seek immediate medical care if: 
· You have new or worse symptoms in your legs or buttocks. Symptoms may include: ¨ Numbness or tingling. ¨ Weakness. ¨ Pain. · You lose bladder or bowel control. Watch closely for changes in your health, and be sure to contact your doctor if: 
· You are not getting better as expected. Where can you learn more? Go to http://fernando-chriss.info/. Enter [phone] in the search box to learn more about \"Sciatica: Care Instructions. \" Current as of: March 21, 2017 Content Version: 11.4 © 6511-7186 [employer]. Care instructions adapted under license by [employer] (which disclaims liability or warranty for this information). If you have questions about a medical condition or this instruction, always ask your healthcare professional. [place] [address] any warranty or liability for your use of this information. Back Pain, Emergency or Urgent Symptoms: Care Instructions Your Care Instructions Many people have back pain at one time or another. In most cases, pain gets better with self-care that includes over-the-counter pain medicine, ice, heat, and exercises. Unless you have symptoms of a severe injury or heart attack, you may be able to give yourself a few days before you call a doctor. But some back problems are very serious.  Do not ignore symptoms that need to be checked right away. Follow-up care is a key part of your treatment and safety. Be sure to make and go to all appointments, and call your doctor if you are having problems. It's also a good idea to know your test results and keep a list of the medicines you take. How can you care for yourself at home? · Sit or lie in positions that are most comfortable and that reduce your pain. Try one of these positions when you lie down: ¨ Lie on your back with your knees bent and supported by large pillows. ¨ Lie on the floor with your legs on the seat of a sofa or chair. Danny Foil on your side with your knees and hips bent and a pillow between your legs. ¨ Lie on your stomach if it does not make pain worse. · Do not sit up in bed, and avoid soft couches and twisted positions. Bed rest can help relieve pain at first, but it delays healing. Avoid bed rest after the first day. · Change positions every 30 minutes. If you must sit for long periods of time, take breaks from sitting. Get up and walk around, or lie flat. · Try using a heating pad on a low or medium setting, for 15 to 20 minutes every 2 or 3 hours. Try a warm shower in place of one session with the heating pad. You can also buy single-use heat wraps that last up to 8 hours. You can also try ice or cold packs on your back for 10 to 20 minutes at a time, several times a day. (Put a thin cloth between the ice pack and your skin.) This reduces pain and makes it easier to be active and exercise. · Take pain medicines exactly as directed. ¨ If the doctor gave you a prescription medicine for pain, take it as prescribed. ¨ If you are not taking a prescription pain medicine, ask your doctor if you can take an over-the-counter medicine. When should you call for help? Call 911 anytime you think you may need emergency care. For example, call if: 
? · You are unable to move a leg at all. ? · You have back pain with severe belly pain. ? · You have symptoms of a heart attack. These may include: ¨ Chest pain or pressure, or a strange feeling in the chest. 
¨ Sweating. ¨ Shortness of breath. ¨ Nausea or vomiting. ¨ Pain, pressure, or a strange feeling in the back, neck, jaw, or upper belly or in one or both shoulders or arms. ¨ Lightheadedness or sudden weakness. ¨ A fast or irregular heartbeat. After you call 911, the  may tell you to chew 1 adult-strength or 2 to 4 low-dose aspirin. Wait for an ambulance. Do not try to drive yourself. ?Call your doctor now or seek immediate medical care if: 
? · You have new or worse symptoms in your arms, legs, chest, belly, or buttocks. Symptoms may include: ¨ Numbness or tingling. ¨ Weakness. ¨ Pain. ? · You lose bladder or bowel control. ? · You have back pain and: 
¨ You have injured your back while lifting or doing some other activity. Call if the pain is severe, has not gone away after 1 or 2 days, and you cannot do your normal daily activities. ¨ You have had a back injury before that needed treatment. ¨ Your pain has lasted longer than 4 weeks. ¨ You have had weight loss you cannot explain. ¨ You are age 48 or older. ¨ You have cancer now or have had it before. ? Watch closely for changes in your health, and be sure to contact your doctor if you are not getting better as expected. Where can you learn more? Go to http://fernando-chriss.info/. Enter L755 in the search box to learn more about \"Back Pain, Emergency or Urgent Symptoms: Care Instructions. \" Current as of: March 20, 2017 Content Version: 11.4 © 7359-0961 [employer]. Care instructions adapted under license by [employer] (which disclaims liability or warranty for this information).  If you have questions about a medical condition or this instruction, always ask your healthcare professional. [place] [address] any warranty or liability for your use of this information. Introducing [facility]! Dear Raman Cancino: Thank you for requesting a Dunwello account. Our records indicate that you already have an active Dunwello account. You can access your account anytime at [email or web address]. [employer]ekStream Solutions Did you know that you can access your hospital and ER discharge instructions at any time in Dunwello? You can also review all of your test results from your hospital stay or ER visit. Additional Information If you have questions, please visit the Frequently Asked Questions section of the Dunwello website at [email or web address] Remember, Dunwello is NOT to be used for urgent needs. For medical emergencies, dial 911. Now available from your iPhone and Android! Please provide this summary of care documentation to your next provider. Your primary care clinician is listed as [place] Everett. If you have any questions after today's visit, please call [phone].

## 2018-01-26 ENCOUNTER — HOSPITAL ENCOUNTER (OUTPATIENT)
Dept: MRI IMAGING | Age: 48
Discharge: HOME | End: 2018-01-26
Attending: NURSE PRACTITIONER
Payer: COMMERCIAL

## 2018-01-26 DIAGNOSIS — M21.372 LEFT FOOT DROP: ICD-10-CM

## 2018-01-26 DIAGNOSIS — M51.36 BULGING LUMBAR DISC: ICD-10-CM

## 2018-01-26 PROCEDURE — 72148 MRI LUMBAR SPINE W/O DYE: CPT

## 2018-01-30 ENCOUNTER — OFFICE VISIT (OUTPATIENT)
Dept: ORTHOPEDIC SURGERY | Age: 48
End: 2018-01-30

## 2018-01-30 VITALS
BODY MASS INDEX: 24.89 KG/M2 | DIASTOLIC BLOOD PRESSURE: 76 MMHG | OXYGEN SATURATION: 98 % | WEIGHT: 145.8 LBS | HEART RATE: 88 BPM | TEMPERATURE: 98.1 F | RESPIRATION RATE: 20 BRPM | SYSTOLIC BLOOD PRESSURE: 133 MMHG | HEIGHT: 64 IN

## 2018-01-30 DIAGNOSIS — M16.12 PRIMARY OSTEOARTHRITIS OF LEFT HIP: Primary | ICD-10-CM

## 2018-01-30 DIAGNOSIS — M47.816 LUMBAR FACET ARTHROPATHY: ICD-10-CM

## 2018-01-30 DIAGNOSIS — M51.26 HNP (HERNIATED NUCLEUS PULPOSUS), LUMBAR: ICD-10-CM

## 2018-01-30 NOTE — PROGRESS NOTES
[address]  [address], [address]  [facility], [address]  Phone: [phone]  Fax: [phone]  INITIAL CONSULTATION  Patient: Camron Ibrahim                MRN: [id number]       SSN: [id number]  YOB: 1970        AGE: 49 y.o. SEX: female  Body mass index is 25.03 kg/(m^2). PCP: Faith Dias MD  01/30/18    Chief Complaint   Patient presents with    Back Pain     surgery consult          HISTORY OF PRESENT ILLNESS, RADIOGRAPHS, and PLAN:         Dictation #1  [id number]  TCL:[id number]        Past Medical History:   Diagnosis Date    Anxiety     Arthritis     Asthma     pt states she does not have asthma    Chronic pain     Chronic Back Pain     Depression     Gastrointestinal disorder     GERD (gastroesophageal reflux disease)     well controlled w/ meds    Hypertension     no medications currently; off [id number]    IBS (irritable bowel syndrome)     Other ill-defined conditions(799.89)     bulging disc    Other ill-defined conditions(799.89)     sinusitis    Tattoo     TATTOO       Family History   Problem Relation Age of Onset    Breast Cancer Mother     Coronary Artery Disease Other     Breast Cancer Sister        Current Outpatient Prescriptions   Medication Sig Dispense Refill    citalopram (CELEXA) 10 mg tablet TAKE 1 TABLET BY ORAL ROUTE EVERY DAY  5    montelukast (SINGULAIR) 10 mg tablet TAKE 1 TABLET BY ORAL ROUTE EVERY DAY IN THE EVENING  3    baclofen (LIORESAL) 10 mg tablet Take 1 Tab by mouth three (3) times daily as needed. 60 Tab 1    HYDROcodone-acetaminophen (NORCO) 7.5-325 mg per tablet Take 1 Tab by mouth every six (6) hours as needed for Pain (for severe pain). Max Daily Amount: 4 Tabs. 28 Tab 0    topiramate (TOPAMAX) 25 mg tablet Take 3 Tabs by mouth two (2) times daily (with meals). 180 Tab 1    albuterol (ACCUNEB) 1.25 mg/3 mL nebu Take 3 mL by inhalation every four (4) hours as needed (wheezing). 25 Each 0    albuterol (PROVENTIL HFA, VENTOLIN HFA, PROAIR HFA) 90 mcg/actuation inhaler Take 2 Puffs by inhalation every four (4) hours as needed for Wheezing. 1 Inhaler 0    Nebulizer & Compressor machine DX: Bronchial Asthma 1 Each 0    Nebulizer Accessories kit Use as directed 1 Kit 0    SYMBICORT 160-4.5 mcg/actuation HFA inhaler inhale 2 puffs by mouth twice a day IN THE MORNING AND EVENING  0    dicyclomine (BENTYL) 10 mg capsule take 1 capsule by mouth three times a day  0    famotidine (PEPCID) 40 mg tablet Take 40 mg by mouth daily.  celecoxib (CELEBREX) 100 mg capsule Take  by mouth two (2) times a day.  losartan (COZAAR) 100 mg tablet Take 100 mg by mouth daily.  amLODIPine (NORVASC) 5 mg tablet Take 5 mg by mouth daily.  ALPRAZolam (XANAX) 0.5 mg tablet Take 1 Tab by mouth nightly as needed for Anxiety. 7 Tab 0    cholecalciferol (VITAMIN D3) 1,000 unit tablet Take 3,000 Units by mouth daily.  cetirizine (ZYRTEC) 10 mg tablet Take  by mouth daily as needed.          Allergies   Allergen Reactions    Percocet [Oxycodone-Acetaminophen] Nausea and Vomiting    Advair Diskus [Fluticasone-Salmeterol] Nausea and Vomiting    Ibuprofen Nausea and Vomiting    Tylenol [Acetaminophen] Other (comments)     Causes upset stomach       Past Surgical History:   Procedure Laterality Date    ENDOSCOPY, COLON, DIAGNOSTIC      HX BREAST BIOPSY      left    HX CHOLECYSTECTOMY  2016    HX CYST INCISION AND DRAINAGE      left    HX GYN  2011    cysts removed from both ovaries    HX HYSTERECTOMY      partial     LAP,CHOLECYSTECTOMY  2-25-16    Dr. Julianna Jones       Past Medical History:   Diagnosis Date    Anxiety     Arthritis     Asthma     pt states she does not have asthma    Chronic pain     Chronic Back Pain     Depression     Gastrointestinal disorder     GERD (gastroesophageal reflux disease)     well controlled w/ meds    Hypertension     no medications currently; off [id number]    IBS (irritable bowel syndrome)     Other ill-defined conditions(799.89)     bulging disc    Other ill-defined conditions(799.89)     sinusitis    Tattoo     TATTOO       Social History     Social History    Marital status:      Spouse name: N/A    Number of children: N/A    Years of education: N/A     Occupational History    Not on file. Social History Main Topics    Smoking status: Current Every Day Smoker     Packs/day: 0.00     Last attempt to quit: 10/1/2010    Smokeless tobacco: Never Used    Alcohol use No    Drug use: No    Sexual activity: Not on file     Other Topics Concern    Not on file     Social History Narrative           REVIEW OF SYSTEMS:   CONSTITUTIONAL SYMPTOMS:  Negative. EYES:  Negative. EARS, NOSE, THROAT AND MOUTH:  Negative. CARDIOVASCULAR:  Negative. RESPIRATORY:  Negative. GENITOURINARY: Negative. GASTROINTESTINAL:  Negative. INTEGUMENTARY (SKIN AND/OR BREAST):  Negative. MUSCULOSKELETAL: Per HPI.   ENDOCRINE/RHEUMATOLOGIC:  Negative. NEUROLOGICAL:  Per HPI. HEMATOLOGIC/LYMPHATIC:  Negative. ALLERGIC/IMMUNOLOGIC:  Negative. PSYCHIATRIC:  Negative. PHYSICAL EXAMINATION:   Visit Vitals    /76    Pulse 88    Temp 98.1 °F (36.7 °C) (Oral)    Resp 20    Ht 5' 4\" (1.626 m)    Wt 145 lb 12.8 oz (66.1 kg)    LMP 01/01/2013    SpO2 98%    BMI 25.03 kg/m2    PAIN SCALE: 6/10    CONSTITUTIONAL: The patient is in no apparent distress and is alert and oriented x 3. HEENT: Normocephalic. Hearing grossly intact. NECK: Supple and symmetric. no tenderness, or masses were felt. RESPIRATORY: No labored breathing. CARDIOVASCULAR: The carotid pulses were normal. Peripheral pulses were 2+. CHEST: Normal AP diameter and normal contour without any kyphoscoliosis. LYMPHATIC: No lymphadenopathy was appreciated in the neck, axillae or groin.   SKIN:  Negative for scars, rashes, lesions, or ulcers on the right upper, right lower, left upper, left lower and trunk. NEUROLOGICAL: Alert and oriented x 3. Ambulation {Blank Single Select Template:20061::\"without assistive device\",\"with single point cane\",\"with quad cane\",\"with walker\",\"with crutches\"}. FWB. EXTREMITIES:  See musculoskeletal.  MUSCULOSKELETAL:   Head and Neck:  Negative for misalignment, asymmetry, crepitation, defects, tenderness masses or effusions.  Left Upper Extremity: Inspection, percussion and palpation preformed. Binghams sign is negative.  Right Upper Extremity: Inspection, percussion and palpation preformed. Binghams sign is negative.  Spine, Ribs and Pelvis: Inspection, percussion and palpation preformed. Negative for misalignment, asymmetry, crepitation, defects, tenderness masses or effusions.  Left Lower Extremity: Inspection, percussion and palpation preformed. Negative straight leg raise.  Right Lower Extremity: Inspection, percussion and palpation preformed. Negative straight leg raise. SPINE EXAM:     Lumbar spine: No rash, ecchymosis, or gross obliquity. No focal atrophy is noted. ASSESSMENT    ICD-10-CM ICD-9-CM    1. HNP (herniated nucleus pulposus), lumbar M51.26 722.10    2. Lumbar facet arthropathy M12.88 721.3        Written by Yuki Murdock, as dictated by Rafal Lopez MD.    I, Dr. Rafal Lopez MD, confirm that all documentation is accurate.

## 2018-01-30 NOTE — PROGRESS NOTES
[address]  [address], [address]  [address]  Phone: [phone]  Fax: [phone]  INITIAL CONSULTATION  Patient: Francesca Huang                MRN: [id number]       SSN: [id number]  YOB: 1970        AGE: 49 y.o. SEX: female  Body mass index is 25.03 kg/(m^2). PCP: Lisa Garrett MD  01/30/18    Chief Complaint   Patient presents with    Back Pain     surgery consult          HISTORY OF PRESENT ILLNESS, RADIOGRAPHS, and PLAN:         HISTORY:  Ms. Keyonna Oropeza returns today. She is continuing to have numbness in her left leg, no objective weakness in the left leg, though she says she feels some weakness when she walks a while. Today, she is mainly complaining about pain around her left hip and her buttock and her groin and her lateral thigh. She tells me she went to see Dr. Andreina Santiago, a hip specialist on the [facility], who says she is bone-on-bone in her left hip and gave her an injection in her left hip in the office that did not provide him any relief, but she is going to see him in consultation for possible hip replacement. RADIOGRAPHS:  I reviewed an MRI of her spine that demonstrates a right paracentral disc protrusion compressing the right S1 nerve root. There is no evidence of any left-sided, nerve compressive pathology. The radiologist feels in some ways the disc pathology has improved. ASSESSMENT/PLAN: I explained to her that I cannot see any pathology in her spine that would explain her left-sided symptoms. Her EMG on her left lower extremity is normal and the disc herniation is right-sided. She has no nerve tension signs. She has no objective weakness. The symptomatology we have seen varies. I have not evidence of any surgical pathology. At this time, her pain is mainly in her hip and the numbness in her leg varies, and she has had normal nerve conduction study.   At this point, my recommendation would be to follow up with Dr. Fabien Andino, if he, as an experienced orthopedic surgeon feels her pain generator is in her hip, it is something she may wish to take seriously and have further treatments for it. If there are further issues with numbness in her leg, I see nothing in her peripheral nerves given her normal EMG or in her lumbar spine that explains it. She may wish to discuss the matter further with a neurologist and see if further investigations are in order, but I cannot explain it from her lumbar spine. I will see her back again as needed. Past Medical History:   Diagnosis Date    Anxiety     Arthritis     Asthma     pt states she does not have asthma    Chronic pain     Chronic Back Pain     Depression     Gastrointestinal disorder     GERD (gastroesophageal reflux disease)     well controlled w/ meds    Hypertension     no medications currently; off [id number]    IBS (irritable bowel syndrome)     Other ill-defined conditions(799.89)     bulging disc    Other ill-defined conditions(799.89)     sinusitis    Tattoo     TATTOO       Family History   Problem Relation Age of Onset    Breast Cancer Mother     Coronary Artery Disease Other     Breast Cancer Sister        Current Outpatient Prescriptions   Medication Sig Dispense Refill    citalopram (CELEXA) 10 mg tablet TAKE 1 TABLET BY ORAL ROUTE EVERY DAY  5    montelukast (SINGULAIR) 10 mg tablet TAKE 1 TABLET BY ORAL ROUTE EVERY DAY IN THE EVENING  3    baclofen (LIORESAL) 10 mg tablet Take 1 Tab by mouth three (3) times daily as needed. 60 Tab 1    HYDROcodone-acetaminophen (NORCO) 7.5-325 mg per tablet Take 1 Tab by mouth every six (6) hours as needed for Pain (for severe pain). Max Daily Amount: 4 Tabs. 28 Tab 0    topiramate (TOPAMAX) 25 mg tablet Take 3 Tabs by mouth two (2) times daily (with meals). 180 Tab 1    albuterol (ACCUNEB) 1.25 mg/3 mL nebu Take 3 mL by inhalation every four (4) hours as needed (wheezing).  25 Each 0    albuterol (PROVENTIL HFA, VENTOLIN HFA, PROAIR HFA) 90 mcg/actuation inhaler Take 2 Puffs by inhalation every four (4) hours as needed for Wheezing. 1 Inhaler 0    Nebulizer & Compressor machine DX: Bronchial Asthma 1 Each 0    Nebulizer Accessories kit Use as directed 1 Kit 0    SYMBICORT 160-4.5 mcg/actuation HFA inhaler inhale 2 puffs by mouth twice a day IN THE MORNING AND EVENING  0    dicyclomine (BENTYL) 10 mg capsule take 1 capsule by mouth three times a day  0    famotidine (PEPCID) 40 mg tablet Take 40 mg by mouth daily.  celecoxib (CELEBREX) 100 mg capsule Take  by mouth two (2) times a day.  losartan (COZAAR) 100 mg tablet Take 100 mg by mouth daily.  amLODIPine (NORVASC) 5 mg tablet Take 5 mg by mouth daily.  ALPRAZolam (XANAX) 0.5 mg tablet Take 1 Tab by mouth nightly as needed for Anxiety. 7 Tab 0    cholecalciferol (VITAMIN D3) 1,000 unit tablet Take 3,000 Units by mouth daily.  cetirizine (ZYRTEC) 10 mg tablet Take  by mouth daily as needed.          Allergies   Allergen Reactions    Percocet [Oxycodone-Acetaminophen] Nausea and Vomiting    Advair Diskus [Fluticasone-Salmeterol] Nausea and Vomiting    Ibuprofen Nausea and Vomiting    Tylenol [Acetaminophen] Other (comments)     Causes upset stomach       Past Surgical History:   Procedure Laterality Date    ENDOSCOPY, COLON, DIAGNOSTIC      HX BREAST BIOPSY      left    HX CHOLECYSTECTOMY  2016    HX CYST INCISION AND DRAINAGE      left    HX GYN  2011    cysts removed from both ovaries    HX HYSTERECTOMY      partial     LAP,CHOLECYSTECTOMY  2-25-16    Dr. Prerna Ashley       Past Medical History:   Diagnosis Date    Anxiety     Arthritis     Asthma     pt states she does not have asthma    Chronic pain     Chronic Back Pain     Depression     Gastrointestinal disorder     GERD (gastroesophageal reflux disease)     well controlled w/ meds    Hypertension     no medications currently; off [id number]    IBS (irritable bowel syndrome)     Other ill-defined conditions(799.89)     bulging disc    Other ill-defined conditions(799.89)     sinusitis    Tattoo     TATTOO       Social History     Social History    Marital status:      Spouse name: N/A    Number of children: N/A    Years of education: N/A     Occupational History    Not on file. Social History Main Topics    Smoking status: Current Every Day Smoker     Packs/day: 0.00     Last attempt to quit: 10/1/2010    Smokeless tobacco: Never Used    Alcohol use No    Drug use: No    Sexual activity: Not on file     Other Topics Concern    Not on file     Social History Narrative           REVIEW OF SYSTEMS:   CONSTITUTIONAL SYMPTOMS:  Negative. EYES:  Negative. EARS, NOSE, THROAT AND MOUTH:  Negative. CARDIOVASCULAR:  Negative. RESPIRATORY:  Negative. GENITOURINARY: Negative. GASTROINTESTINAL:  Negative. INTEGUMENTARY (SKIN AND/OR BREAST):  Negative. MUSCULOSKELETAL: Per HPI.   ENDOCRINE/RHEUMATOLOGIC:  Negative. NEUROLOGICAL:  Per HPI. HEMATOLOGIC/LYMPHATIC:  Negative. ALLERGIC/IMMUNOLOGIC:  Negative. PSYCHIATRIC:  Negative. PHYSICAL EXAMINATION:   Visit Vitals    /76    Pulse 88    Temp 98.1 °F (36.7 °C) (Oral)    Resp 20    Ht 5' 4\" (1.626 m)    Wt 145 lb 12.8 oz (66.1 kg)    LMP 01/01/2013    SpO2 98%    BMI 25.03 kg/m2    PAIN SCALE: 6/10    CONSTITUTIONAL: The patient is in no apparent distress and is alert and oriented x 3. HEENT: Normocephalic. Hearing grossly intact. NECK: Supple and symmetric. no tenderness, or masses were felt. RESPIRATORY: No labored breathing. CARDIOVASCULAR: The carotid pulses were normal. Peripheral pulses were 2+. CHEST: Normal AP diameter and normal contour without any kyphoscoliosis. LYMPHATIC: No lymphadenopathy was appreciated in the neck, axillae or groin.   SKIN:  Negative for scars, rashes, lesions, or ulcers on the right upper, right lower, left upper, left lower and trunk. NEUROLOGICAL: Alert and oriented x 3. Headaches. Ambulation without assistive device. FWB. EXTREMITIES:  See musculoskeletal.  MUSCULOSKELETAL:   Head and Neck: Negative for misalignment, asymmetry, crepitation, defects, tenderness masses or effusions.  Left Upper Extremity: Inspection, percussion and palpation preformed. Binghams sign is negative.  Right Upper Extremity: Inspection, percussion and palpation preformed. Binghams sign is negative.  Spine, Ribs and Pelvis: Inspection, percussion and palpation preformed. Negative for misalignment, asymmetry, crepitation, defects, tenderness masses or effusions.  Left Lower Extremity: Pain in hip, groin, and thigh. Numbness in foot. Inspection, percussion and palpation preformed. Negative straight leg raise.  Right Lower Extremity: Some pain. Inspection, percussion and palpation preformed. Negative straight leg raise. SPINE EXAM:     Lumbar spine: No rash, ecchymosis, or gross obliquity. No focal atrophy is noted. ASSESSMENT    ICD-10-CM ICD-9-CM    1. Primary osteoarthritis of left hip M16.12 715.15    2. Lumbar facet arthropathy M12.88 721.3    3. HNP (herniated nucleus pulposus), lumbar M51.26 722.10     R L5-S1       Written by Jennie Zuniga, as dictated by Elba Cuadra MD.    I, Dr. Elba Cuadra MD, confirm that all documentation is accurate.

## 2018-01-30 NOTE — MR AVS SNAPSHOT
[address] [place] [address] [facility] [address] [place] [address] 
[phone] Patient: Alex Alonso MRN: [id number] :1970 Visit Information Date & Time Provider Department Dept. Phone Encounter #  
 2018  9:00 AM William Zuniga MD 4 [facility], [address] [facility] [phone] [id number] Follow-up Instructions Return if symptoms worsen or fail to improve. Upcoming Health Maintenance Date Due Pneumococcal 19-64 Medium Risk (1 of 1 - PPSV23) 11/10/1988 DTaP/Tdap/Td series (1 - Tdap) 11/10/1990 PAP AKA CERVICAL CYTOLOGY 11/10/1990 Influenza Age 5 to Adult 2017 Allergies as of 2018  Review Complete On: 2018 By: William Zuniga MD  
  
 Severity Noted Reaction Type Reactions Percocet [Oxycodone-acetaminophen] Medium 2013   Side Effect Nausea and Vomiting Advair Diskus [Fluticasone-salmeterol]  10/07/2015    Nausea and Vomiting Ibuprofen  2013    Nausea and Vomiting Tylenol [Acetaminophen]  2017    Other (comments) Causes upset stomach Current Immunizations  Never Reviewed No immunizations on file. Not reviewed this visit You Were Diagnosed With   
  
 Codes Comments Primary osteoarthritis of left hip    -  Primary ICD-10-CM: M16.12 
ICD-9-CM: 715.15 Lumbar facet arthropathy     ICD-10-CM: M12.88 ICD-9-CM: 721.3 HNP (herniated nucleus pulposus), lumbar     ICD-10-CM: M51.26 
ICD-9-CM: 722.10 R L5-S1 Vitals BP Pulse Temp Resp Height(growth percentile) Weight(growth percentile) 133/76 88 98.1 °F (36.7 °C) (Oral) 20 5' 4\" (1.626 m) 145 lb 12.8 oz (66.1 kg) LMP SpO2 BMI OB Status Smoking Status 2013 98% 25.03 kg/m2 Hysterectomy Current Every Day Smoker Vitals History BMI and BSA Data Body Mass Index Body Surface Area 25.03 kg/m 2 1.73 m 2 Preferred Pharmacy Pharmacy Name Phone [id number] [place], [address] Your Updated Medication List  
  
   
This list is accurate as of: 1/30/18 10:06 AM.  Always use your most recent med list.  
  
  
  
  
 * albuterol 1.25 mg/3 mL Nebu Commonly known as:  Diantha Mages Take 3 mL by inhalation every four (4) hours as needed (wheezing). * albuterol 90 mcg/actuation inhaler Commonly known as:  PROVENTIL HFA, VENTOLIN HFA, PROAIR HFA Take 2 Puffs by inhalation every four (4) hours as needed for Wheezing. ALPRAZolam 0.5 mg tablet Commonly known as:  Rohini Jerrod Take 1 Tab by mouth nightly as needed for Anxiety. baclofen 10 mg tablet Commonly known as:  LIORESAL Take 1 Tab by mouth three (3) times daily as needed. celecoxib 100 mg capsule Commonly known as:  CELEBREX Take  by mouth two (2) times a day. citalopram 10 mg tablet Commonly known as:  CELEXA  
TAKE 1 TABLET BY ORAL ROUTE EVERY DAY  
  
 dicyclomine 10 mg capsule Commonly known as:  BENTYL  
take 1 capsule by mouth three times a day  
  
 famotidine 40 mg tablet Commonly known as:  PEPCID Take 40 mg by mouth daily. HYDROcodone-acetaminophen 7.5-325 mg per tablet Commonly known as:  Clemetine Qi Take 1 Tab by mouth every six (6) hours as needed for Pain (for severe pain). Max Daily Amount: 4 Tabs. losartan 100 mg tablet Commonly known as:  COZAAR Take 100 mg by mouth daily. montelukast 10 mg tablet Commonly known as:  SINGULAIR  
TAKE 1 TABLET BY ORAL ROUTE EVERY DAY IN THE EVENING Nebulizer & Compressor machine DX: Bronchial Asthma Nebulizer Accessories Kit Use as directed NORVASC 5 mg tablet Generic drug:  amLODIPine Take 5 mg by mouth daily. SYMBICORT 160-4.5 mcg/actuation Hfaa Generic drug:  budesonide-formoterol  
inhale 2 puffs by mouth twice a day IN THE MORNING AND EVENING  
  
 topiramate 25 mg tablet Commonly known as:  TOPAMAX Take 3 Tabs by mouth two (2) times daily (with meals). VITAMIN D3 1,000 unit tablet Generic drug:  cholecalciferol Take 3,000 Units by mouth daily. ZyrTEC 10 mg tablet Generic drug:  cetirizine Take  by mouth daily as needed. * Notice: This list has 2 medication(s) that are the same as other medications prescribed for you. Read the directions carefully, and ask your doctor or other care provider to review them with you. Follow-up Instructions Return if symptoms worsen or fail to improve. Introducing [facility]! Dear Yan Ortiz: Thank you for requesting a [employer] account. Our records indicate that you already have an active [employer] account. You can access your account anytime at [email or web address] Did you know that you can access your hospital and ER discharge instructions at any time in [employer]? You can also review all of your test results from your hospital stay or ER visit. Additional Information If you have questions, please visit the Frequently Asked Questions section of the [employer] website at [email or web address] Remember, [employer] is NOT to be used for urgent needs. For medical emergencies, dial 911. Now available from your iPhone and Android! Please provide this summary of care documentation to your next provider. Your primary care clinician is listed as Kyle Parks. If you have any questions after today's visit, please call [phone].

## 2018-02-09 ENCOUNTER — HOSPITAL ENCOUNTER (OUTPATIENT)
Dept: PHYSICAL THERAPY | Age: 48
Discharge: HOME | End: 2018-02-09
Payer: COMMERCIAL

## 2018-02-09 PROCEDURE — 97530 THERAPEUTIC ACTIVITIES: CPT | Performed by: PHYSICAL THERAPIST

## 2018-02-09 PROCEDURE — 97161 PT EVAL LOW COMPLEX 20 MIN: CPT | Performed by: PHYSICAL THERAPIST

## 2018-02-09 NOTE — PROGRESS NOTES
PT DAILY TREATMENT NOTE/HIP [id number]    Patient Name: Teresa Golden  Date:2018  : 1970  [x]  Patient  Verified  Payor: Rahel Tolliver / Plan: Yandel Moment / Product Type: PPO /    In time:10:30  Out time:11:30  Total Treatment Time (min): 60  Total Timed Codes (min): 30  1:1 Treatment Time (MC only): 60   Visit #: 1 of 8    Treatment Area: Trochanteric bursitis, left hip [M70.62]    SUBJECTIVE  Pain Level (0-10 scale): 7  []constant []intermittent []improving []worsening []no change since onset    Any medication changes, allergies to medications, adverse drug reactions, diagnosis change, or new procedure performed?: [x] No    [] Yes (see summary sheet for update)  Subjective functional status/changes:     Patient has CC of L hip and and anterior L hip pain for 6-8months. SVETLANA: no distinct injury, thinks it was because of poor posture Patient describes pain as vice , tight, sharp, pinching. Pain is worse in PM. Reports numbness/tingling down to toes. Reports popping/clicking. Aggravating factors: laying down, sitting, twisting on hip while standing,. Alleviating factors: nothing. Denies red flags: SOB, chest pain, dizziness/lightheadedness, blurred/double vision, HA, chills/fevers, night sweats, change in bowel/bladder control, abdominal pain, difficulty swallowing, slurred speech, unexplained weight gain/loss. PMHx: osteoporosis, HTN, asthma, scoliosis, tobacco use. Surgical Hx: unremarkable. Social Hx: 1 level home, alcohol, tobacco: since 12yrsold quit down to 3/day; 1 pk/day, work status: works at [employer]. PLOF: independent with ADLs. CLOF: same. Diagnostic Imaging: L hip OA, multiple low back issues.        OBJECTIVE/EXAMINATION    30 min [x]Eval                  []Re-Eval       30 min Therapeutic Activity:  [x]  See flow sheet :   Rationale: increase ROM, increase strength and decrease pain  to improve the patients ability to complete ADLs           With   [] TE   [] TA   [] neuro   [] other: Patient Education: [x] Review HEP    [] Progressed/Changed HEP based on:   [] positioning   [] body mechanics   [] transfers   [] heat/ice application    [] other:        Physical Therapy Evaluation- Hip    Posture:    Gait:  [] Normal    [] Abnormal    [] Antalgic    [] NWB    Device:    Describe:         ROM/Strength        AROM        Strength (1-5)  Hip Left Right Left Right   Flexion 100 120 4 5   Extension 0 10 3- 4   Abduction   4- 5   Knee Left Right Left Right   Extension WNL WNL 4+ 5   Flexion WNL WNL 5 5     Lumbar Repeated motions: no effect on LE symptoms  Palpation  [] Min  [x] Mod  [] Severe    Location: L hip abductors  [] Min  [] Mod  [] Severe    Location:  [] Min  [] Mod  [] Severe    Location:    Optional Tests  Abhishek/ Gabino Test: [] Neg    [x] Pos  Jed Test:  [] Neg    [] Pos  Scouring Test: [x] Neg    [] Pos  Trendelenberg:  [] Neg    [] Pos [] Left    [] Right  OberTest:   [] Neg    [] Pos  Ely's Test:  [] Neg    [] Pos  Piriformis Test:  [] Neg    [] Pos    Other tests/ comments: Lumbar SLR, slump negative       Pain Level (0-10 scale) post treatment: 6    ASSESSMENT/Changes in Function: Patient presents with signs/symptoms of L hip abductor tendinopathy, with possible radicular symptoms present as well. Patient will continue to benefit from skilled PT services to modify and progress therapeutic interventions, address functional mobility deficits, address ROM deficits, address strength deficits, analyze and address soft tissue restrictions, analyze and cue movement patterns, analyze and modify body mechanics/ergonomics and assess and modify postural abnormalities to attain remaining goals. [x]  See Plan of Care  []  See progress note/recertification  []  See Discharge Summary         Progress towards goals / Updated goals:  Short Term Goals:  To be accomplished in 2 weeks:   Patient will report compliance with HEP 1x/day to aid in rehabilitation program.   Status at IE: NA   Current:      Patient will increase hip ROM to 100% in all planes to aid in completion of ADLs   Status at IE: pain limited into ER   Current:       Long Term Goals: To be accomplished in 4-6 weeks:   Patient will increase B LE strength to 5/5 MMT throughout to aid in completion of ADLs. Status at IE: L LE grossly 4/5   Current:     Patient will report pain no greater than 1-2/10 throughout entire day to aid in completion of ADLs   Status at IE: 10at worst   Current:     Patent will perform 5 sit<>stands pain-free to demonstrate improvement in status and aid and completion of ADLs. Status at IE: pain with 1 sit<>stand   Current:     Patient will improve FOTO score to TBD points to demonstrate improvement in functional status.    Status at IE:TBD   Current:          PLAN  []  Upgrade activities as tolerated     [x]  Continue plan of care  []  Update interventions per flow sheet       []  Discharge due to:_  []  Other:_      Janine Felder PT, DPT 2/9/2018  10:40 AM

## 2018-02-09 NOTE — PROGRESS NOTES
[facility]  [address] [place], [address]  Ph [phone]  Fx [phone]    Plan of Care/ Statement of Necessity for Physical Therapy Services    Patient name: Cherrie Galeas Start of Care: 2018   Referral source: Chino Castillo MD : 1970    Medical Diagnosis: Trochanteric bursitis, left hip [M70.62]   Onset Date:6-8months    Treatment Diagnosis: L hip pain   Prior Hospitalization: see medical history Provider#: [id number]   Medications: Verified on Patient summary List    Comorbidities: osteoporosis, HTN, asthma, scoliosis, tobacco use   Prior Level of Function: independent with ADLs      The Plan of Care and following information is based on the information from the initial evaluation. Assessment/ key information: Patient presents with signs/symptoms of L hip abductor tendinopathy, with possible radicular symptoms present as well. Patient will continue to benefit from skilled PT services to modify and progress therapeutic interventions, address functional mobility deficits, address ROM deficits, address strength deficits, analyze and address soft tissue restrictions, analyze and cue movement patterns, analyze and modify body mechanics/ergonomics and assess and modify postural abnormalities to attain remaining goals.     Evaluation Complexity History HIGH Complexity :3+ comorbidities / personal factors will impact the outcome/ POC ; Examination MEDIUM Complexity : 3 Standardized tests and measures addressing body structure, function, activity limitation and / or participation in recreation  ;Presentation LOW Complexity : Stable, uncomplicated  ;Clinical Decision Making MEDIUM Complexity : FOTO score of 26-74  Overall Complexity Rating: LOW   Problem List: pain affecting function, decrease ROM, decrease strength, edema affecting function, impaired gait/ balance, decrease ADL/ functional abilitiies, decrease activity tolerance, decrease flexibility/ joint mobility and decrease transfer abilities   Treatment Plan may include any combination of the following: Therapeutic exercise, Therapeutic activities, Neuromuscular re-education, Physical agent/modality, Manual therapy, Aquatic therapy, Patient education, Self Care training and Functional mobility training  Patient / Family readiness to learn indicated by: asking questions, trying to perform skills and interest  Persons(s) to be included in education: patient (P)  Barriers to Learning/Limitations: None  Patient Goal (s): release, stretch, muscle tightness & knots  Patient Self Reported Health Status: good  Rehabilitation Potential: good    Short Term Goals: To be accomplished in 2 weeks:                        Patient will report compliance with HEP 1x/day to aid in rehabilitation program.                        Status at IE: NA                        Current:                            Patient will increase hip ROM to 100% in all planes to aid in completion of ADLs                        Status at IE: pain limited into ER                        Current:         Long Term Goals: To be accomplished in 4-6 weeks:                        Patient will increase B LE strength to 5/5 MMT throughout to aid in completion of ADLs. Status at IE: L LE grossly 4/5                        Current:                           Patient will report pain no greater than 1-2/10 throughout entire day to aid in completion of ADLs                        Status at IE: 10at worst                        Current:                           Patent will perform 5 sit<>stands pain-free to demonstrate improvement in status and aid and completion of ADLs. Status at IE: pain with 1 sit<>stand                        Current:                           Patient will improve FOTO score to TBD points to demonstrate improvement in functional status.                         Status at IE:TBD Current:  Frequency / Duration: Patient to be seen 2 times per week for 6 weeks. Patient/ Caregiver education and instruction: Diagnosis, prognosis, self care, activity modification and exercises   [x]  Plan of care has been reviewed with TERENCE Solares PT, DPT 2/9/2018 1:42 PM    ________________________________________________________________________    I certify that the above Therapy Services are being furnished while the patient is under my care. I agree with the treatment plan and certify that this therapy is necessary.     Physician's Signature:____________________  Date:____________Time: _________    Please sign and return to [facility]  [address], [address]  Ph [phone]  Fx [phone]

## 2018-02-15 DIAGNOSIS — M79.2 NEURITIS: ICD-10-CM

## 2018-02-16 ENCOUNTER — HOSPITAL ENCOUNTER (OUTPATIENT)
Dept: PHYSICAL THERAPY | Age: 48
Discharge: HOME | End: 2018-02-16
Payer: COMMERCIAL

## 2018-02-16 PROCEDURE — 97110 THERAPEUTIC EXERCISES: CPT

## 2018-02-16 PROCEDURE — 97530 THERAPEUTIC ACTIVITIES: CPT

## 2018-02-16 NOTE — PROGRESS NOTES
PT DAILY TREATMENT NOTE     Patient Name: Cherrie Galeas  Date:2018  : 1970  [x]  Patient  Verified  Payor: Farhana Elizabeth / Plan: Dorna Spurling / Product Type: PPO /    In time:11:00  Out time:11:50  Total Treatment Time (min): 50  Visit #: 2 of 8    Treatment Area: Trochanteric bursitis, left hip [M70.62]    SUBJECTIVE  Pain Level (0-10 scale): 8/10  Any medication changes, allergies to medications, adverse drug reactions, diagnosis change, or new procedure performed?: [x] No    [] Yes (see summary sheet for update)  Subjective functional status/changes:   [] No changes reported  \"I have pain all the time. \"    OBJECTIVE       30 min Therapeutic Exercise:  [x] See flow sheet :   Rationale: increase ROM, increase strength and improve coordination to improve the patients ability to return to prior level of physical activity. 20 min Therapeutic Activity:  [x]  See flow sheet :   Rationale: increase ROM, increase strength and improve coordination  to improve the patients ability to return to prior level of physical activity. With   [] TE   [] TA   [] neuro   [] other: Patient Education: [x] Review HEP    [] Progressed/Changed HEP based on:   [] positioning   [] body mechanics   [] transfers   [] heat/ice application    [] other:      Other Objective/Functional Measures:      Pain Level (0-10 scale) post treatment: 6/10    ASSESSMENT/Changes in Function: Pt tolerated ex well. Pt did not report any increases in pain throughout tx. HEP distributed to pt. Patient will continue to benefit from skilled PT services to modify and progress therapeutic interventions, address functional mobility deficits, address ROM deficits, address strength deficits, analyze and address soft tissue restrictions, analyze and cue movement patterns and analyze and modify body mechanics/ergonomics to attain remaining goals.      []  See Plan of Care  []  See progress note/recertification  [] See Discharge Summary         Progress towards goals / Updated goals:  Short Term Goals: To be accomplished in 2 weeks:                        UKUQWVJ will report compliance with HEP 1x/day to aid in rehabilitation program.                        PXVXYS at IE: NA                        Current: HEP distributed                            Patient will increase hip ROM to 100% in all planes to aid in completion of ADLs                        Status at IE: pain limited into ER                        Current: NT          Long Term Goals: To be accomplished in 4-6 weeks:                        Patient will increase B LE strength to 5/5 MMT throughout to aid in completion of ADLs.                       HZWLXF at IE: L LE grossly 4/5                        Current: NT                            Patient will report pain no greater than 1-2/10 throughout entire day to aid in completion of ADLs                        Status at IE: 10at worst                        Current: NT                            Patent will perform 5 sit<>stands pain-free to demonstrate improvement in status and aid and completion of ADLs.                       SZIPII at IE: pain with 1 sit<>stand                        Current: NT                            Patient will improve FOTO score to TBD points to demonstrate improvement in functional status.                         HXVIOT at IE:TBD                        Current: NT    PLAN  [x]  Upgrade activities as tolerated     [x]  Continue plan of care  []  Update interventions per flow sheet       []  Discharge due to:_  []  Other:_      [facility] Dmitry 2/16/2018  11:09 AM    Future Appointments  Date Time Provider Poncho Dias   2/20/2018 9:00 AM Mara Flynn PT [facility]   2/23/2018 11:00  [address] [facility]   3/2/2018 1:30  [address] [facility]   3/5/2018 9:00 AM [facility]   3/9/2018 10:00 AM [facility]

## 2018-02-20 ENCOUNTER — HOSPITAL ENCOUNTER (OUTPATIENT)
Dept: PHYSICAL THERAPY | Age: 48
Discharge: HOME | End: 2018-02-20
Payer: COMMERCIAL

## 2018-02-20 PROCEDURE — 97112 NEUROMUSCULAR REEDUCATION: CPT | Performed by: PHYSICAL THERAPIST

## 2018-02-20 PROCEDURE — 97110 THERAPEUTIC EXERCISES: CPT | Performed by: PHYSICAL THERAPIST

## 2018-02-20 NOTE — PROGRESS NOTES
PT DAILY TREATMENT NOTE     Patient Name: Orlando Tineo  Date:2018  : 1970  [x]  Patient  Verified  Payor: Kenzie Grade / Plan: Alessandra Wells / Product Type: PPO /    In time:9:00  Out time:40  Total Treatment Time (min): 40  Visit #: 3 of 8    Treatment Area: Trochanteric bursitis, left hip [M70.62]    SUBJECTIVE  Pain Level (0-10 scale): 5 leg; 8 back  Any medication changes, allergies to medications, adverse drug reactions, diagnosis change, or new procedure performed?: [x] No    [] Yes (see summary sheet for update)  Subjective functional status/changes:   [] No changes reported  The back was really hurting after last visit. The bridges were likely what did it, but states she was stubborn and added to it. OBJECTIVE    32 min Therapeutic Exercise:  [x] See flow sheet :   Rationale: increase ROM, increase strength and decrease pain to improve the patients ability to complete ADLs    8 min Neuromuscular Re-education:  [x]  See flow sheet :   Rationale: increase strength, improve coordination and increase proprioception  to improve the patients ability to complete ADLs and exercise program effectively    With   [] TE   [] TA   [] neuro   [] other: Patient Education: [x] Review HEP    [] Progressed/Changed HEP based on:   [] positioning   [] body mechanics   [] transfers   [] heat/ice application    [] other:         Pain Level (0-10 scale) post treatment: 5/10    ASSESSMENT/Changes in Function: Patient responds well to treatment session. Does not appear to have a directional preference for increasing or decreasing radicular symptoms. Does respond well to VC for decreasing PPT during bridging exercise to enhance glute involvement. Has less pain with this. . No adverse effects were noted from today's treatment session      Patient will continue to benefit from skilled PT services to modify and progress therapeutic interventions, address functional mobility deficits, address ROM deficits, address strength deficits, analyze and address soft tissue restrictions, analyze and cue movement patterns, analyze and modify body mechanics/ergonomics and assess and modify postural abnormalities to attain remaining goals. []  See Plan of Care  []  See progress note/recertification  []  See Discharge Summary         Progress towards goals / Updated goals:  Short Term Goals: To be accomplished in 2 weeks:                        Patient will report compliance with HEP 1x/day to aid in rehabilitation program.                        RZTXON at IE: NA                        Current: HEP distributed                            Patient will increase hip ROM to 100% in all planes to aid in completion of ADLs                        Status at IE: pain limited into ER                        Current: NT          Long Term Goals: To be accomplished in 4-6 weeks:                        Patient will increase B LE strength to 5/5 MMT throughout to aid in completion of ADLs.                       ITMHCK at IE: L LE grossly 4/5                        Current: NT                            Patient will report pain no greater than 1-2/10 throughout entire day to aid in completion of ADLs                        Status at IE: 10at worst                        Current: NT                            Patent will perform 5 sit<>stands pain-free to demonstrate improvement in status and aid and completion of ADLs.                       FXQPVL at IE: pain with 1 sit<>stand                        Current: NT                            Patient will improve FOTO score to TBD points to demonstrate improvement in functional status.                         FHYCTA at IE:TBD                        Current: NT       PLAN  [x]  Upgrade activities as tolerated     [x]  Continue plan of care  []  Update interventions per flow sheet       []  Discharge due to:_  []  Other:_      Sylvia Barfield, PT, DPT 2/20/2018  9:00 AM    Future Appointments  Date Time Provider Poncho Dias   2/23/2018 11:00  [address] [facility]   3/2/2018 1:30 PM [facility]   3/5/2018 9:00 AM [facility]   3/9/2018 10:00 AM [facility]

## 2018-02-23 ENCOUNTER — HOSPITAL ENCOUNTER (OUTPATIENT)
Dept: PHYSICAL THERAPY | Age: 48
Discharge: HOME | End: 2018-02-23
Payer: COMMERCIAL

## 2018-02-23 PROCEDURE — 97530 THERAPEUTIC ACTIVITIES: CPT

## 2018-02-23 PROCEDURE — 97110 THERAPEUTIC EXERCISES: CPT

## 2018-02-23 NOTE — PROGRESS NOTES
PT DAILY TREATMENT NOTE     Patient Name: Rene Durham  PJUN:  : 1970  [x]  Patient  Verified  Payor: Sophia Titus / Plan: Liz Penny / Product Type: PPO /    In time:11:00  Out time:11:53  Total Treatment Time (min): 48  Visit #: 4 of 8    Treatment Area: Trochanteric bursitis, left hip [M70.62]    SUBJECTIVE  Pain Level (0-10 scale): 4/10  Any medication changes, allergies to medications, adverse drug reactions, diagnosis change, or new procedure performed?: [x] No    [] Yes (see summary sheet for update)  Subjective functional status/changes:   [] No changes reported  \"I have a little less pain today. \"    OBJECTIVE    33 min Therapeutic Exercise:  [x] See flow sheet :   Rationale: increase ROM, increase strength and improve coordination to improve the patients ability to perform ADLs with less pain. 20 min Therapeutic Activity:  [x]  See flow sheet :   Rationale: increase ROM, increase strength and improve coordination  to improve the patients ability to perform ADLs with less pain. With   [] TE   [] TA   [] neuro   [] other: Patient Education: [x] Review HEP    [] Progressed/Changed HEP based on:   [] positioning   [] body mechanics   [] transfers   [] heat/ice application    [] other:      Other Objective/Functional Measures:      Pain Level (0-10 scale) post treatment: 4/10    ASSESSMENT/Changes in Function: Pt tolerated trx well. Pt continues to be moderately fatigue by therex involving glute strenthging. Pt denied modalities post tx. Patient will continue to benefit from skilled PT services to modify and progress therapeutic interventions, address functional mobility deficits, address ROM deficits, address strength deficits, analyze and address soft tissue restrictions, analyze and cue movement patterns and analyze and modify body mechanics/ergonomics to attain remaining goals.      []  See Plan of Care  []  See progress note/recertification  []  See Discharge Summary         Progress towards goals / Updated goals:  Short Term Goals: To be accomplished in 2 weeks:                        DVSSEMO will report compliance with HEP 1x/day to aid in rehabilitation program.                        KCNYIR at IE: NA                        Current: HEP distributed                            Patient will increase hip ROM to 100% in all planes to aid in completion of ADLs                        Status at IE: pain limited into ER                        Current: NT          Long Term Goals: To be accomplished in 4-6 weeks:                        Patient will increase B LE strength to 5/5 MMT throughout to aid in completion of ADLs.                       LWYZPY at IE: L LE grossly 4/5                        Current: NT                            Patient will report pain no greater than 1-2/10 throughout entire day to aid in completion of ADLs                        Status at IE: 10at worst                        Current: NT                            Patent will perform 5 sit<>stands pain-free to demonstrate improvement in status and aid and completion of ADLs.                       JSCCEW at IE: pain with 1 sit<>stand                        Current: NT                            Patient will improve FOTO score to TBD points to demonstrate improvement in functional status.                         DZXBBD at IE:TBD                        Current: NT          PLAN  [x]  Upgrade activities as tolerated     [x]  Continue plan of care  []  Update interventions per flow sheet       []  Discharge due to:_  []  Other:_      Justin Tamayo 2/23/2018  11:39 AM    Future Appointments  Date Time Provider Poncho Dias   3/2/2018 1:30 PM [facility]   3/5/2018 9:00 AM [facility]   3/9/2018 10:00 AM [facility]

## 2018-03-02 ENCOUNTER — HOSPITAL ENCOUNTER (OUTPATIENT)
Dept: PHYSICAL THERAPY | Age: 48
Discharge: HOME | End: 2018-03-02
Payer: COMMERCIAL

## 2018-03-02 PROCEDURE — 97530 THERAPEUTIC ACTIVITIES: CPT

## 2018-03-02 PROCEDURE — 97110 THERAPEUTIC EXERCISES: CPT

## 2018-03-02 NOTE — PROGRESS NOTES
PT DAILY TREATMENT NOTE     Patient Name: Pool Nazario  Date:3/2/2018  : 1970  [x]  Patient  Verified  Payor: Juan Fajardo / Plan: Domingo Benson / Product Type: PPO /    In time:1:30  Out time:2:30  Total Treatment Time (min): 60  Visit #: 5 of 8    Treatment Area: Trochanteric bursitis, left hip [M70.62]    SUBJECTIVE  Pain Level (0-10 scale): 4/10  Any medication changes, allergies to medications, adverse drug reactions, diagnosis change, or new procedure performed?: [x] No    [] Yes (see summary sheet for update)  Subjective functional status/changes:   [] No changes reported  \"I always have pain. Just a little less today. \"      OBJECTIVE      40 min Therapeutic Exercise:  [x] See flow sheet :   Rationale: increase ROM, increase strength and improve coordination to improve the patients ability to perform ADLs with less pain. 20 min Therapeutic Activity:  [x]  See flow sheet :   Rationale: increase ROM, increase strength and improve coordination  to improve the patients ability to perform ADLs with less pain. With   [] TE   [] TA   [] neuro   [] other: Patient Education: [x] Review HEP    [] Progressed/Changed HEP based on:   [] positioning   [] body mechanics   [] transfers   [] heat/ice application    [] other:      Other Objective/Functional Measures:      Pain Level (0-10 scale) post treatment: 3/10    ASSESSMENT/Changes in Function: Pt fatigued by therex post tx. Pt performed gluteal/abdominal ex well with very little VC. Pt denied modalities post tx. Patient will continue to benefit from skilled PT services to modify and progress therapeutic interventions, address functional mobility deficits, address ROM deficits, address strength deficits, analyze and address soft tissue restrictions, analyze and cue movement patterns and analyze and modify body mechanics/ergonomics to attain remaining goals.      []  See Plan of Care  []  See progress note/recertification  []  See Discharge Summary         Progress towards goals / Updated goals:  Short Term Goals: To be accomplished in 2 weeks:                         OZXZUHY will report compliance with HEP 1x/day to aid in rehabilitation program.                        ZEFDLW at IE: NA                        Current: Pt reports compliance every other day                            Patient will increase hip ROM to 100% in all planes to aid in completion of ADLs                        Status at IE: pain limited into ER                        Current: NT          Long Term Goals: To be accomplished in 4-6 weeks:                        Patient will increase B LE strength to 5/5 MMT throughout to aid in completion of ADLs.                       GIPWCY at IE: L LE grossly 4/5                        Current: NT                            Patient will report pain no greater than 1-2/10 throughout entire day to aid in completion of ADLs                        Status at IE: 10at worst                        Current: 5/10 at worst                            Patent will perform 5 sit<>stands pain-free to demonstrate improvement in status and aid and completion of ADLs.                       WVHBQK at IE: pain with 1 sit<>stand                        Current: NT                            Patient will improve FOTO score to TBD points to demonstrate improvement in functional status.                         JQXKDK at IE:TBD                        Current: NT              PLAN  [x]  Upgrade activities as tolerated     [x]  Continue plan of care  []  Update interventions per flow sheet       []  Discharge due to:_  []  Other:_      Beronica Mcleod 3/2/2018  1:44 PM    Future Appointments  Date Time Provider Poncho Dias   3/5/2018 9:00 [facility]   3/9/2018 10:00  W White  [facility]

## 2018-03-05 ENCOUNTER — HOSPITAL ENCOUNTER (OUTPATIENT)
Dept: PHYSICAL THERAPY | Age: 48
Discharge: HOME | End: 2018-03-05
Payer: COMMERCIAL

## 2018-03-05 PROCEDURE — 97110 THERAPEUTIC EXERCISES: CPT

## 2018-03-05 PROCEDURE — 97530 THERAPEUTIC ACTIVITIES: CPT

## 2018-03-05 NOTE — PROGRESS NOTES
PT DAILY TREATMENT NOTE     Patient Name: Loki Angusing  Date:3/5/2018  : 1970  [x]  Patient  Verified  Payor: Uri Dunn / Plan: Merlin Reel / Product Type: PPO /    In time:9:00  Out time:9:48  Total Treatment Time (min): 48  Visit #: 6 of 8    Treatment Area: Trochanteric bursitis, left hip [M70.62]    SUBJECTIVE  Pain Level (0-10 scale): 5/10  Any medication changes, allergies to medications, adverse drug reactions, diagnosis change, or new procedure performed?: [x] No    [] Yes (see summary sheet for update)  Subjective functional status/changes:   [] No changes reported  \"I got pain in my hip and back. \"    OBJECTIVE      30 min Therapeutic Exercise:  [x] See flow sheet :   Rationale: increase ROM, increase strength and improve coordination to improve the patients ability to perform ADLs with less pain. 18 min Therapeutic Activity:  [x]  See flow sheet :   Rationale: increase ROM, increase strength and improve coordination  to improve the patients ability to perform ADLs with less pain. With   [] TE   [] TA   [] neuro   [] other: Patient Education: [x] Review HEP    [] Progressed/Changed HEP based on:   [] positioning   [] body mechanics   [] transfers   [] heat/ice application    [] other:      Other Objective/Functional Measures:      Pain Level (0-10 scale) post treatment: 5/10    ASSESSMENT/Changes in Function: Pt reports increased pain in hip today. Pt tolerated tx but requested hold with squats. Pt identified squats as increased cause of pain. Pt denied modalities post tx. Patient will continue to benefit from skilled PT services to modify and progress therapeutic interventions, address functional mobility deficits, address ROM deficits, address strength deficits, analyze and address soft tissue restrictions, analyze and cue movement patterns and analyze and modify body mechanics/ergonomics to attain remaining goals.      []  See Plan of Care  [] See progress note/recertification  []  See Discharge Summary         Progress towards goals / Updated goals:  Short Term Goals: To be accomplished in 2 weeks:                         NRDRAJP will report compliance with HEP 1x/day to aid in rehabilitation program.                        HACGYE at IE: NA                        Current: Pt reports compliance every other day                            Patient will increase hip ROM to 100% in all planes to aid in completion of ADLs                        Status at IE: pain limited into ER                        Current: NT          Long Term Goals: To be accomplished in 4-6 weeks:                        Patient will increase B LE strength to 5/5 MMT throughout to aid in completion of ADLs.                       KFAPPZ at IE: L LE grossly 4/5                        Current: NT                            Patient will report pain no greater than 1-2/10 throughout entire day to aid in completion of ADLs                        Status at IE: 10at worst                        Current: 5/10 at worst                            Patent will perform 5 sit<>stands pain-free to demonstrate improvement in status and aid and completion of ADLs.                       HISFYM at IE: pain with 1 sit<>stand                        Current: 6/10 pain when performing sit-stand from home couch                            Patient will improve FOTO score to TBD points to demonstrate improvement in functional status.                         OPHKZA at IE:TBD                        Current: [address]  [x]  Upgrade activities as tolerated     [x]  Continue plan of care  []  Update interventions per flow sheet       []  Discharge due to:_  []  Other:_      Keke Diaz 3/5/2018  9:15 AM    Future Appointments  Date Time Provider Poncho Dias   3/9/2018 10:00 [address] [facility]

## 2018-03-09 ENCOUNTER — HOSPITAL ENCOUNTER (OUTPATIENT)
Dept: PHYSICAL THERAPY | Age: 48
Discharge: HOME | End: 2018-03-09
Payer: COMMERCIAL

## 2018-03-09 PROCEDURE — 97110 THERAPEUTIC EXERCISES: CPT

## 2018-03-09 NOTE — PROGRESS NOTES
PT DAILY TREATMENT NOTE     Patient Name: Ruddy Box  Date:3/9/2018  : 1970   [x]  Patient  Verified  Payor: Anthony Cancino / Plan: Judy Butler / Product Type: PPO /    In time:10:00  Out time:10:31  Total Treatment Time (min): 31  Visit #: 7 of 8    Treatment Area: Trochanteric bursitis, left hip [M70.62]    SUBJECTIVE  Pain Level (0-10 scale): 5/10  Any medication changes, allergies to medications, adverse drug reactions, diagnosis change, or new procedure performed?: [x] No    [] Yes (see summary sheet for update)  Subjective functional status/changes:   [] No changes reported  \"I have pain today. \"    OBJECTIVE      31 min Therapeutic Exercise:  [x] See flow sheet :   Rationale: increase ROM, increase strength and improve coordination to improve the patients ability to perform ADLs with less pain. With   [] TE   [] TA   [] neuro   [] other: Patient Education: [x] Review HEP    [] Progressed/Changed HEP based on:   [] positioning   [] body mechanics   [] transfers   [] heat/ice application    [] other:      Other Objective/Functional Measures:      Pain Level (0-10 scale) post treatment: 0/10    ASSESSMENT/Changes in Function: Pt continues to be challenged by therex. Pt reported required abbreviated session due to time constraints with work. Pt denied modalities due to time constraints. Patient will continue to benefit from skilled PT services to modify and progress therapeutic interventions, address functional mobility deficits, address ROM deficits, address strength deficits, analyze and address soft tissue restrictions, analyze and cue movement patterns, analyze and modify body mechanics/ergonomics and assess and modify postural abnormalities to attain remaining goals.      []  See Plan of Care  []  See progress note/recertification  []  See Discharge Summary         Progress towards goals / Updated goals:  Short Term Goals: To be accomplished in 2 weeks:                         HNNUJIA will report compliance with HEP 1x/day to aid in rehabilitation program.                        NTDXBX at IE: NA                        Current: Pt reports compliance every other day                            Patient will increase hip ROM to 100% in all planes to aid in completion of ADLs                        Status at IE: pain limited into ER                        Current: NT          Long Term Goals: To be accomplished in 4-6 weeks:                        Patient will increase B LE strength to 5/5 MMT throughout to aid in completion of ADLs.                       KXUSTY at IE: L LE grossly 4/5                        Current: NT                            Patient will report pain no greater than 1-2/10 throughout entire day to aid in completion of ADLs                        Status at IE: 10at worst                        Current: 5/10 at worst                            Patent will perform 5 sit<>stands pain-free to demonstrate improvement in status and aid and completion of ADLs.                       EICZWK at IE: pain with 1 sit<>stand                        Current: 6/10 pain when performing sit-stand from home couch                            Patient will improve FOTO score to TBD points to demonstrate improvement in functional status.                       XUPXJV at IE:TBD                        Current: [address]  [x]  Upgrade activities as tolerated     [x]  Continue plan of care  []  Update interventions per flow sheet       []  Discharge due to:_  []  Other:_      Beronica Mcleod 3/9/2018  10:30 AM    No future appointments.

## 2018-03-30 ENCOUNTER — HOSPITAL ENCOUNTER (OUTPATIENT)
Dept: PHYSICAL THERAPY | Age: 48
End: 2018-03-30
Payer: COMMERCIAL

## 2018-04-06 ENCOUNTER — HOSPITAL ENCOUNTER (OUTPATIENT)
Dept: PHYSICAL THERAPY | Age: 48
Discharge: HOME | End: 2018-04-06
Payer: COMMERCIAL

## 2018-04-06 PROCEDURE — 97110 THERAPEUTIC EXERCISES: CPT | Performed by: PHYSICAL THERAPIST

## 2018-04-06 NOTE — PROGRESS NOTES
[facility] at [address] [place], [address] [place]  Ph [phone]  Fx [phone]    Physical Therapy Progress Note  Patient name: Sukh Delgado Start of Care: 18   Referral source: Caterina Anton MD : 1970   Medical/Treatment Diagnosis: Trochanteric bursitis, left hip [M70.62] Onset Date:7-8months     Prior Hospitalization: see medical history Provider#: [id number]   Medications: Verified on Patient Summary List    Comorbidities: osteoporosis, HTN, asthma, scoliosis, tobacco use  Prior Level of Function:independent w/ ADLs    Visits from Start of Care: 8    Missed Visits: 0    Established Goals:         Excellent           Good         Limited           None  [] Increased ROM   []  []  [x]  []  [x] Increased Strength   []  []  [x]  []  [x] Increased Mobility   []  []  [x]  []   [x] Decreased Pain   []  []  []  []  [] Decreased Swelling  []  []  []  []    Key Functional Changes: NA  Updated Goals: to be achieved in 4 weeks:   Short Term Goals: To be accomplished in 2 weeks:                         Patient will report compliance with HEP 1x/day to aid in rehabilitation program.                        IUWQKY at IE: NA                        Current: Pt reports compliance every other day                            Patient will increase hip ROM to 100% in all planes to aid in completion of ADLs                        Status at IE: pain limited into ER                        Current: NT          Long Term Goals: To be accomplished in 4 weeks:                        Patient will increase B LE strength to 5/5 MMT throughout to aid in completion of ADLs.                         HPSWIX at IE: L LE grossly 4/5                        Current: NT                            Patient will report pain no greater than 1-2/10 throughout entire day to aid in completion of ADLs                        Status at IE: 10at worst                        Current: 5/10 at worst                            Patent will perform 5 sit<>stands pain-free to demonstrate improvement in status and aid and completion of ADLs.                       YWOLLS at IE: pain with 1 sit<>stand                        Current: 6/10 pain when performing sit-stand from home couch                            Patient will improve FOTO score to TBD points to demonstrate improvement in functional status.                       EGGQXS at IE:TBD                        Current: 48    ASSESSMENT/RECOMMENDATIONS:  Patient symptoms appear much the same as initial evaluation date. Has minimal evidence of nerve involvement, symptoms appear related to gluteal and other soft tissue structures. Was originially making progress, but it appears the program was accelerated too rapidly, and caused the patient to back track. We have scheduled the patient with the evaluating therapist from this point forward and will ensure a more calculated progression of exercise. Patient is agreeable to this, pending physician approval.    Patient will continue to benefit from skilled PT services to modify and progress therapeutic interventions, address functional mobility deficits, address ROM deficits, address strength deficits, analyze and address soft tissue restrictions, analyze and cue movement patterns, analyze and modify body mechanics/ergonomics and assess and modify postural abnormalities to attain remaining goals.      [x]Continue therapy per initial plan/protocol at a frequency of  2 x per week for 4 weeks  []Continue therapy with the following recommended changes:_____________________      _____________________________________________________________________  []Discontinue therapy progressing towards or have reached established goals  []Discontinue therapy due to lack of appreciable progress towards goals  []Discontinue therapy due to lack of attendance or compliance  []Await Physician's recommendations/decisions regarding therapy  []Other:________________________________________________________________    Thank you for this referral.   Triny Villagomez PT, DPT 4/6/2018 2:50 PM    NOTE TO PHYSICIAN:  PLEASE COMPLETE THE ORDERS BELOW AND   FAX TO [facility]: [phone]  If you are unable to process this request in 24 hours please contact our office: [phone]      ____ I have read the above report and request that my patient continue therapy with the following changes/special instructions:  ____ I have read the above report and request that my patient be discharged from therapy    Physician's Signature:_____________________ Date:___________Time:__________

## 2018-04-06 NOTE — PROGRESS NOTES
PT DAILY TREATMENT NOTE     Patient Name: Tim Khan  Date:2018  : 1970  [x]  Patient  Verified  Payor: Hill Erazo / Plan: Kevin Trimble / Product Type: PPO /    In time:9:32  Out time:10:06  Total Treatment Time (min): 34  Visit #: 8 of 8 (add 8 to = 16)    Treatment Area: Trochanteric bursitis, left hip [M70.62]    SUBJECTIVE  Pain Level (0-10 scale): 5  Any medication changes, allergies to medications, adverse drug reactions, diagnosis change, or new procedure performed?: [x] No    [] Yes (see summary sheet for update)  Subjective functional status/changes:   [] No changes reported  Feels about the same. States everything was going well, but then things were increased and changed a bit and the pain started back up. Even the numbness had gotten better, but that returned. OBJECTIVE    34 min Therapeutic Exercise:  [x] See flow sheet :   Rationale: increase ROM, increase strength and decrease pain to improve the patients ability to complete ADLs        With   [] TE   [] TA   [] neuro   [] other: Patient Education: [x] Review HEP    [] Progressed/Changed HEP based on:   [] positioning   [] body mechanics   [] transfers   [] heat/ice application    [] other:      Other Objective/Functional Measures:   MMT: 4/5 in B hips, LE 5/5 remainder,  Pain with sacral springing     Pain Level (0-10 scale) post treatment: 5    ASSESSMENT/Changes in Function: Patient responds well to treatment session. Patient symptoms appear much the same as initial evaluation date. Has minimal evidence of nerve involvement, symptoms appear related to gluteal and other soft tissue structures. Was originially making progress, but it appears the program was accelerated too rapidly, and caused the patient to back track. We have scheduled the patient with the evaluating therapist from this point forward and will ensure a more calculated progression of exercise. Patient is agreeable to this. Patient . No adverse effects were noted from today's treatment session      Patient will continue to benefit from skilled PT services to modify and progress therapeutic interventions, address functional mobility deficits, address ROM deficits, address strength deficits, analyze and address soft tissue restrictions, analyze and cue movement patterns, analyze and modify body mechanics/ergonomics and assess and modify postural abnormalities to attain remaining goals. []  See Plan of Care  []  See progress note/recertification  []  See Discharge Summary         Progress towards goals / Updated goals:  Short Term Goals: To be accomplished in 2 weeks:                         Patient will report compliance with HEP 1x/day to aid in rehabilitation program.                        KCHTYX at IE: NA                        Current: Pt reports compliance every other day                            Patient will increase hip ROM to 100% in all planes to aid in completion of ADLs                        Status at IE: pain limited into ER                        Current: NT          Long Term Goals: To be accomplished in 4-6 weeks:                        Patient will increase B LE strength to 5/5 MMT throughout to aid in completion of ADLs.                       LCNIDV at IE: L LE grossly 4/5                        Current: NT                            Patient will report pain no greater than 1-2/10 throughout entire day to aid in completion of ADLs                        Status at IE: 10at worst                        Current: 5/10 at worst                            Patent will perform 5 sit<>stands pain-free to demonstrate improvement in status and aid and completion of ADLs.                         PHCCMY at IE: pain with 1 sit<>stand                        Current: 6/10 pain when performing sit-stand from home couch                            Patient will improve FOTO score to TBD points to demonstrate improvement in functional status.                         YQAFDO at IE:TBD                        Current: 50    PLAN  []  Upgrade activities as tolerated     [x]  Continue plan of care  []  Update interventions per flow sheet       []  Discharge due to:_  []  Other:_      Cristiana Mack PT, DPT 4/6/2018  10:21 AM    Future Appointments  Date Time Provider Poncho Dias   4/12/2018 8:00 AM Cristiana Mack PT [facility]   4/13/2018 11:00 AM Cristiana Mack PT [facility]   4/18/2018 1:00 PM Cristiana Mack PT [facility]   4/20/2018 11:00 AM Cristiana Mack PT [facility]   4/24/2018 1:00 PM Cristiana Mack PT [facility]   4/26/2018 1:00 PM Cristiana Mack, [address] [place] [address] THE [facility]

## 2018-04-12 ENCOUNTER — HOSPITAL ENCOUNTER (OUTPATIENT)
Dept: PHYSICAL THERAPY | Age: 48
Discharge: HOME | End: 2018-04-12
Payer: COMMERCIAL

## 2018-04-12 PROCEDURE — 97110 THERAPEUTIC EXERCISES: CPT | Performed by: PHYSICAL THERAPIST

## 2018-04-12 PROCEDURE — 97530 THERAPEUTIC ACTIVITIES: CPT | Performed by: PHYSICAL THERAPIST

## 2018-04-12 NOTE — PROGRESS NOTES
PT DAILY TREATMENT NOTE     Patient Name: Venita Mathur  Date:2018  : 1970  [x]  Patient  Verified  Payor: Melanie Song / Plan: Sandy Bird / Product Type: PPO /    In time:8:00  Out time:8:27  Total Treatment Time (min): 27  Visit #: 9 of 16    Treatment Area: Trochanteric bursitis, left hip [M70.62]    SUBJECTIVE  Pain Level (0-10 scale): 4  Any medication changes, allergies to medications, adverse drug reactions, diagnosis change, or new procedure performed?: [x] No    [] Yes (see summary sheet for update)  Subjective functional status/changes:   [] No changes reported  Is tired. Just getting off a shift. Has pain all the time    OBJECTIVE    15 min Therapeutic Exercise:  [x] See flow sheet :   Rationale: increase ROM, increase strength and decrease pain to improve the patients ability to complete ADLs    12 min Therapeutic Activity:  [x]  See flow sheet :   Rationale: increase ROM, increase strength and improve coordination  to improve the patients ability to complete ADLs          With   [] TE   [] TA   [] neuro   [] other: Patient Education: [x] Review HEP    [] Progressed/Changed HEP based on:   [] positioning   [] body mechanics   [] transfers   [] heat/ice application    [] other:         Pain Level (0-10 scale) post treatment: 4    ASSESSMENT/Changes in Function: Patient responds well to treatment session. Does not seem to have specific response to lumbar ext exercises, will trial these over subsequent visits. Is challenged by hip abduction an ext exercises. Pain with L S/L with pain on L. Progress as tolerated.  No adverse effects were noted from today's treatment session      Patient will continue to benefit from skilled PT services to modify and progress therapeutic interventions, address functional mobility deficits, address ROM deficits, address strength deficits, analyze and address soft tissue restrictions, analyze and cue movement patterns, analyze and modify body mechanics/ergonomics and assess and modify postural abnormalities to attain remaining goals. []  See Plan of Care  []  See progress note/recertification  []  See Discharge Summary         Progress towards goals / Updated goals:  pdated Goals: to be achieved in 4 weeks:                         Short Term Goals: To be accomplished in 2 weeks:                         Patient will report compliance with HEP 1x/day to aid in rehabilitation program.                        OLVSGQ at IE: NA                        Current: Pt reports compliance every other day                            Patient will increase hip ROM to 100% in all planes to aid in completion of ADLs                        Status at IE: pain limited into ER                        Current: NT          Long Term Goals: To be accomplished in 4 weeks:                        Patient will increase B LE strength to 5/5 MMT throughout to aid in completion of ADLs.                       DBRHRD at IE: L LE grossly 4/5                        Current: NT                            Patient will report pain no greater than 1-2/10 throughout entire day to aid in completion of ADLs                        Status at IE: 10at worst                        Current: 5/10 at worst                            Patent will perform 5 sit<>stands pain-free to demonstrate improvement in status and aid and completion of ADLs.                       VUDTDY at IE: pain with 1 sit<>stand                        Current: 6/10 pain when performing sit-stand from home couch                            Patient will improve FOTO score to TBD points to demonstrate improvement in functional status.                         YNMJEI at IE:TBD                        Current: 50    PLAN  []  Upgrade activities as tolerated     [x]  Continue plan of care  []  Update interventions per flow sheet       []  Discharge due to:_  []  Other:_      Martin Gaxiola, PT, DPT 4/12/2018  8:13 AM    Future Appointments  Date Time Provider Poncho Dias   4/18/2018 1:00 PM Jefferson Medrano, [address] [facility]   4/20/2018 11:00 AM Jefferson Medrano PT [facility]   4/24/2018 1:00 PM Jefferson Medrano PT [facility]   4/26/2018 1:00 PM Jefferson Medrano PT [facility]

## 2018-04-13 ENCOUNTER — APPOINTMENT (OUTPATIENT)
Dept: PHYSICAL THERAPY | Age: 48
End: 2018-04-13
Payer: COMMERCIAL

## 2018-04-18 ENCOUNTER — HOSPITAL ENCOUNTER (OUTPATIENT)
Dept: PHYSICAL THERAPY | Age: 48
Discharge: HOME | End: 2018-04-18
Payer: COMMERCIAL

## 2018-04-18 PROCEDURE — 97110 THERAPEUTIC EXERCISES: CPT | Performed by: PHYSICAL THERAPIST

## 2018-04-18 NOTE — PROGRESS NOTES
PT DAILY TREATMENT NOTE     Patient Name: Venita Mathur  Date:2018  : 1970  [x]  Patient  Verified  Payor: Melanie Song / Plan: Sandy Bird / Product Type: PPO /    In time:10:00  Out time:10:24  Total Treatment Time (min): 24  Visit #: 10 of 16    Treatment Area: Trochanteric bursitis, left hip [M70.62]    SUBJECTIVE  Pain Level (0-10 scale): 5  Any medication changes, allergies to medications, adverse drug reactions, diagnosis change, or new procedure performed?: [x] No    [] Yes (see summary sheet for update)  Subjective functional status/changes:   [] No changes reported  Feels alright. Has pain today. Is adjusting to her new job schedule and taking care of her father. OBJECTIVE    24 min Therapeutic Exercise:  [x] See flow sheet :   Rationale: increase ROM, increase strength and decrease pain to improve the patients ability to complete ADLs      With   [] TE   [] TA   [] neuro   [] other: Patient Education: [x] Review HEP    [] Progressed/Changed HEP based on:   [] positioning   [] body mechanics   [] transfers   [] heat/ice application    [] other:         Pain Level (0-10 scale) post treatment: 5    ASSESSMENT/Changes in Function: Patient responds well to treatment session. Responds well to [facility] for correct form/technique during squats with elevated table. Progress as tolerated. No adverse effects were noted from today's treatment session      Patient will continue to benefit from skilled PT services to modify and progress therapeutic interventions, address functional mobility deficits, address ROM deficits, address strength deficits, analyze and address soft tissue restrictions, analyze and cue movement patterns, analyze and modify body mechanics/ergonomics and assess and modify postural abnormalities to attain remaining goals.      []  See Plan of Care  []  See progress note/recertification  []  See Discharge Summary         Progress towards goals / Updated goals:  Updated Goals: to be achieved in 4 weeks:                         Short Term Goals: To be accomplished in 2 weeks:                         Patient will report compliance with HEP 1x/day to aid in rehabilitation program.                        FGHIDB at IE: NA                        Current: Pt reports compliance every other day                            Patient will increase hip ROM to 100% in all planes to aid in completion of ADLs                        Status at IE: pain limited into ER                        Current: NT          Long Term Goals: To be accomplished in 4 weeks:                        Patient will increase B LE strength to 5/5 MMT throughout to aid in completion of ADLs.                       BJIHKU at IE: L LE grossly 4/5                        Current: NT                            Patient will report pain no greater than 1-2/10 throughout entire day to aid in completion of ADLs                        Status at IE: 10at worst                        Current: 5/10 at worst                            Patent will perform 5 sit<>stands pain-free to demonstrate improvement in status and aid and completion of ADLs.                       EKMNEL at IE: pain with 1 sit<>stand                        Current: 6/10 pain when performing sit-stand from home couch                            Patient will improve FOTO score to TBD points to demonstrate improvement in functional status.                         AVHGYJ at IE:TBD                        Current: 50    PLAN  []  Upgrade activities as tolerated     [x]  Continue plan of care  []  Update interventions per flow sheet       []  Discharge due to:_  []  Other:_      Denny Arambula PT, DPT 4/18/2018  10:10 AM    Future Appointments  Date Time Provider Poncho Dias   4/20/2018 11:00 AM Denny Arambula PT [facility]   4/24/2018 1:00 PM Denny Arambula PT [facility]   4/26/2018 1:00 PM Denny Arambula, [facility]

## 2018-04-20 ENCOUNTER — HOSPITAL ENCOUNTER (OUTPATIENT)
Dept: PHYSICAL THERAPY | Age: 48
Discharge: HOME | End: 2018-04-20
Payer: COMMERCIAL

## 2018-04-20 PROCEDURE — 97110 THERAPEUTIC EXERCISES: CPT | Performed by: PHYSICAL THERAPIST

## 2018-04-20 NOTE — PROGRESS NOTES
PT DAILY TREATMENT NOTE     Patient Name: Tyler Hogan  Date:2018  : 1970  [x]  Patient  Verified  Payor: Melba Boggs / Plan: Monet Carpenter / Product Type: PPO /    In time:11:00  Out time:11:25  Total Treatment Time (min): 25  Visit #: 7 of 24    Treatment Area: Trochanteric bursitis, left hip [M70.62]    SUBJECTIVE  Pain Level (0-10 scale): 0  Any medication changes, allergies to medications, adverse drug reactions, diagnosis change, or new procedure performed?: [x] No    [] Yes (see summary sheet for update)  Subjective functional status/changes:   [] No changes reported  It hurts today. No new issues    OBJECTIVE    25 min Therapeutic Exercise:  [x] See flow sheet :   Rationale: increase ROM, increase strength and decrease pain to improve the patients ability to complete ADLs        With   [] TE   [] TA   [] neuro   [] other: Patient Education: [x] Review HEP    [] Progressed/Changed HEP based on:   [] positioning   [] body mechanics   [] transfers   [] heat/ice application    [] other:        Pain Level (0-10 scale) post treatment: 2    ASSESSMENT/Changes in Function: Patient responds well to treatment session. Patient responds to abd isometrics for decreased pain (in standing). Progress as tolerated. No adverse effects were noted from today's treatment session      Patient will continue to benefit from skilled PT services to modify and progress therapeutic interventions, address functional mobility deficits, address ROM deficits, address strength deficits, analyze and address soft tissue restrictions, analyze and cue movement patterns, analyze and modify body mechanics/ergonomics and assess and modify postural abnormalities to attain remaining goals.      []  See Plan of Care  []  See progress note/recertification  []  See Discharge Summary         Progress towards goals / Updated goals:  Updated Goals: to be achieved in 4 weeks:                         Short Term Goals: To be accomplished in 2 weeks:                         Patient will report compliance with HEP 1x/day to aid in rehabilitation program.                        OOXBSX at IE: NA                        Current: Pt reports compliance every other day                            Patient will increase hip ROM to 100% in all planes to aid in completion of ADLs                        Status at IE: pain limited into ER                        Current: NT          Long Term Goals: To be accomplished in 4 weeks:                        Patient will increase B LE strength to 5/5 MMT throughout to aid in completion of ADLs.                       UZMCYD at IE: L LE grossly 4/5                        Current: NT                            Patient will report pain no greater than 1-2/10 throughout entire day to aid in completion of ADLs                        Status at IE: 10at worst                        Current: 5/10 at worst                            Patent will perform 5 sit<>stands pain-free to demonstrate improvement in status and aid and completion of ADLs.                       DOCDAC at IE: pain with 1 sit<>stand                        Current: 6/10 pain when performing sit-stand from home couch                            Patient will improve FOTO score to TBD points to demonstrate improvement in functional status.                         RHRHMO at IE:TBD                        Current: 50    PLAN  []  Upgrade activities as tolerated     [x]  Continue plan of care  []  Update interventions per flow sheet       []  Discharge due to:_  []  Other:_      Emiliana Hua PT, DPT 4/20/2018  11:13 AM    Future Appointments  Date Time Provider Poncho Dias   4/24/2018 1:00 PM Emiliana Hua PT, DPT [facility] [place] [facility]   4/26/2018 1:00 PM Emiliana Hua PT, DPT [facility]

## 2018-04-24 ENCOUNTER — HOSPITAL ENCOUNTER (OUTPATIENT)
Dept: PHYSICAL THERAPY | Age: 48
Discharge: HOME | End: 2018-04-24
Payer: COMMERCIAL

## 2018-04-24 PROCEDURE — 97110 THERAPEUTIC EXERCISES: CPT | Performed by: PHYSICAL THERAPIST

## 2018-04-24 NOTE — PROGRESS NOTES
PT DAILY TREATMENT NOTE     Patient Name: Tyler Hogan  Date:2018  : 1970  [x]  Patient  Verified  Payor: Melba Boggs / Plan: Monet Carpenter / Product Type: PPO /    In time:1:00  Out time:1:30  Total Treatment Time (min): 30  Visit #: 12 of 16    Treatment Area: Trochanteric bursitis, left hip [M70.62]    SUBJECTIVE  Pain Level (0-10 scale): 7  Any medication changes, allergies to medications, adverse drug reactions, diagnosis change, or new procedure performed?: [x] No    [] Yes (see summary sheet for update)  Subjective functional status/changes:   [] No changes reported  Isn't feeling good today. Didn't sleep well last night. Did try the isometrics for pain relief. OBJECTIVE    30 min Therapeutic Exercise:  [x] See flow sheet :   Rationale: increase ROM, increase strength and decrease pain to improve the patients ability to complete ADLs        With   [] TE   [] TA   [] neuro   [] other: Patient Education: [x] Review HEP    [] Progressed/Changed HEP based on:   [] positioning   [] body mechanics   [] transfers   [] heat/ice application    [] other:        Pain Level (0-10 scale) post treatment: 5    ASSESSMENT/Changes in Function: Patient responds well to treatment session. Is challenged by supine SLR into flexion and abduction, but able to complete with modified set/rep counts. Will progress as tolerated. Advised to perform isometrics for pain relief as needed. No adverse effects were noted from today's treatment session      Patient will continue to benefit from skilled PT services to modify and progress therapeutic interventions, address functional mobility deficits, address ROM deficits, address strength deficits, analyze and address soft tissue restrictions, analyze and cue movement patterns, analyze and modify body mechanics/ergonomics and assess and modify postural abnormalities to attain remaining goals.      []  See Plan of Care  []  See progress note/recertification  []  See Discharge Summary         Progress towards goals / Updated goals:  Updated Goals: to be achieved in 4 weeks:                         Short Term Goals: To be accomplished in 2 weeks:                         Patient will report compliance with HEP 1x/day to aid in rehabilitation program.                        HMZRPQ at IE: NA                        Current: Pt reports compliance every other day                            Patient will increase hip ROM to 100% in all planes to aid in completion of ADLs                        Status at IE: pain limited into ER                        Current: NT          Long Term Goals: To be accomplished in 4 weeks:                        Patient will increase B LE strength to 5/5 MMT throughout to aid in completion of ADLs.                       CLUZFW at IE: L LE grossly 4/5                        Current: NT                            Patient will report pain no greater than 1-2/10 throughout entire day to aid in completion of ADLs                        Status at IE: 10at worst                        Current: 5/10 at worst                            Patent will perform 5 sit<>stands pain-free to demonstrate improvement in status and aid and completion of ADLs.                       MYKFLU at IE: pain with 1 sit<>stand                        Current: 6/10 pain when performing sit-stand from home couch                            Patient will improve FOTO score to TBD points to demonstrate improvement in functional status.                         YCLRPV at IE:TBD                        Current: 50    PLAN  []  Upgrade activities as tolerated     [x]  Continue plan of care  []  Update interventions per flow sheet       []  Discharge due to:_  []  Other:_      Juno Solitario PT, DPT 4/24/2018  1:02 PM    Future Appointments  Date Time Provider Poncho Dias   4/26/2018 1:00 PM Juno Solitario PT, DPT [facility]

## 2018-04-26 ENCOUNTER — HOSPITAL ENCOUNTER (OUTPATIENT)
Dept: PHYSICAL THERAPY | Age: 48
Discharge: HOME | End: 2018-04-26
Payer: COMMERCIAL

## 2018-04-26 PROCEDURE — 97110 THERAPEUTIC EXERCISES: CPT | Performed by: PHYSICAL THERAPIST

## 2018-04-26 NOTE — PROGRESS NOTES
PT DAILY TREATMENT NOTE     Patient Name: Héctor De Oliveira  Date:2018  : 1970  [x]  Patient  Verified  Payor: Denita Montano / Plan: Ignacio Cha / Product Type: PPO /    In time:1:00  Out time:1:25  Total Treatment Time (min): 25  Visit #: 13 of 16    Treatment Area: Trochanteric bursitis, left hip [M70.62]    SUBJECTIVE  Pain Level (0-10 scale): 4  Any medication changes, allergies to medications, adverse drug reactions, diagnosis change, or new procedure performed?: [x] No    [] Yes (see summary sheet for update)  Subjective functional status/changes:   [] No changes reported  Feels good, better than yesterday. OBJECTIVE    25 min Therapeutic Exercise:  [x] See flow sheet :   Rationale: increase ROM, increase strength and decrease pain to improve the patients ability to complete ADLs            With   [] TE   [] TA   [] neuro   [] other: Patient Education: [x] Review HEP    [] Progressed/Changed HEP based on:   [] positioning   [] body mechanics   [] transfers   [] heat/ice application    [] other:        Pain Level (0-10 scale) post treatment: 4    ASSESSMENT/Changes in Function: Patient responds well to treatment session. Is progressing with progressive tendon loading program. No adverse effects were noted from today's treatment session      Patient will continue to benefit from skilled PT services to modify and progress therapeutic interventions, address functional mobility deficits, address ROM deficits, address strength deficits, analyze and address soft tissue restrictions, analyze and cue movement patterns, analyze and modify body mechanics/ergonomics and assess and modify postural abnormalities to attain remaining goals.      []  See Plan of Care  []  See progress note/recertification  []  See Discharge Summary         Progress towards goals / Updated goals:  Updated Goals: to be achieved in 4 weeks:                         Short Term Goals: To be accomplished in 2 weeks:                         XRDXISN will report compliance with HEP 1x/day to aid in rehabilitation program.                        PSXSFN at IE: NA                        Current: Pt reports compliance every other day                            Patient will increase hip ROM to 100% in all planes to aid in completion of ADLs                        Status at IE: pain limited into ER                        Current: NT          Long Term Goals: To be accomplished in 4 weeks:                        Patient will increase B LE strength to 5/5 MMT throughout to aid in completion of ADLs.                       XKTJCR at IE: L LE grossly 4/5                        Current: NT                            Patient will report pain no greater than 1-2/10 throughout entire day to aid in completion of ADLs                        Status at IE: 10at worst                        Current: 5/10 at worst                            Patent will perform 5 sit<>stands pain-free to demonstrate improvement in status and aid and completion of ADLs.                       SXSPYB at IE: pain with 1 sit<>stand                        Current: 6/10 pain when performing sit-stand from home couch                            Patient will improve FOTO score to TBD points to demonstrate improvement in functional status.                       DBQJCJ at IE:TBD                        Current: 50    PLAN  []  Upgrade activities as tolerated     [x]  Continue plan of care  []  Update interventions per flow sheet       []  Discharge due to:_  []  Other:_      Krzysztof Patel PT, DPT 4/26/2018  1:04 PM    No future appointments.

## 2018-04-27 ENCOUNTER — HOSPITAL ENCOUNTER (OUTPATIENT)
Dept: GENERAL RADIOLOGY | Age: 48
Discharge: HOME | End: 2018-04-27
Payer: COMMERCIAL

## 2018-04-27 DIAGNOSIS — J45.909 ASTHMA: ICD-10-CM

## 2018-04-27 PROCEDURE — 71046 X-RAY EXAM CHEST 2 VIEWS: CPT

## 2018-05-02 ENCOUNTER — HOSPITAL ENCOUNTER (OUTPATIENT)
Dept: PHYSICAL THERAPY | Age: 48
Discharge: HOME | End: 2018-05-02
Payer: COMMERCIAL

## 2018-05-02 PROCEDURE — 97110 THERAPEUTIC EXERCISES: CPT

## 2018-05-02 NOTE — PROGRESS NOTES
PT DAILY TREATMENT NOTE     Patient Name: Cris Kaba  Date:2018  : 1970  [x]  Patient  Verified  Payor: Gabriele Felix / Plan: Vikas Arndt / Product Type: PPO /    In time:1:00  Out time:1:24  Total Treatment Time (min): 24  Visit #: 14 of 16    Treatment Area: Trochanteric bursitis, left hip [M70.62]    SUBJECTIVE  Pain Level (0-10 scale): 4/10  Any medication changes, allergies to medications, adverse drug reactions, diagnosis change, or new procedure performed?: [x] No    [] Yes (see summary sheet for update)  Subjective functional status/changes:   [] No changes reported  \"Just the normal pain. I went to [place] last weekend. I didn't really have a lot of pain because I took a bunch of meds before I went. \"    OBJECTIVE      24 min Therapeutic Exercise:  [x] See flow sheet :   Rationale: increase ROM, increase strength and improve coordination to improve the patients ability to return to prior level of physical activity. With   [] TE   [] TA   [] neuro   [] other: Patient Education: [x] Review HEP    [] Progressed/Changed HEP based on:   [] positioning   [] body mechanics   [] transfers   [] heat/ice application    [] other:      Other Objective/Functional Measures:      Pain Level (0-10 scale) post treatment: 4/10    ASSESSMENT/Changes in Function: Pt continues to have discomfort in Left hip with therex. Pt reports decreased pain with standing abd isometric. Patient will continue to benefit from skilled PT services to modify and progress therapeutic interventions, address functional mobility deficits, address ROM deficits, address strength deficits, analyze and address soft tissue restrictions, analyze and cue movement patterns, analyze and modify body mechanics/ergonomics and assess and modify postural abnormalities to attain remaining goals.      []  See Plan of Care  []  See progress note/recertification  []  See Discharge Summary         Progress towards goals / Updated goals:  Short Term Goals: To be accomplished in 2 weeks:                         Patient will report compliance with HEP 1x/day to aid in rehabilitation program.                        KQYCTU at IE: NA                        Current: Pt reports compliance every other day                            Patient will increase hip ROM to 100% in all planes to aid in completion of ADLs                        Status at IE: pain limited into ER                        Current: NT          Long Term Goals: To be accomplished in 4 weeks:                        Patient will increase B LE strength to 5/5 MMT throughout to aid in completion of ADLs.                       GBNZSC at IE: L LE grossly 4/5                        Current: NT                            Patient will report pain no greater than 1-2/10 throughout entire day to aid in completion of ADLs                        Status at IE: 10at worst                        Current: 5/10 at worst                            Patent will perform 5 sit<>stands pain-free to demonstrate improvement in status and aid and completion of ADLs.                       OIIRYY at IE: pain with 1 sit<>stand                        Current: 6/10 pain when performing sit-stand from home couch                            Patient will improve FOTO score to TBD points to demonstrate improvement in functional status.                         BQJXPA at IE:TBD                        Current: 50    PLAN  [x]  Upgrade activities as tolerated     [x]  Continue plan of care  []  Update interventions per flow sheet       []  Discharge due to:_  []  Other:_      Allen Risco 5/2/2018  1:03 PM    Future Appointments  Date Time Provider Poncho Dias   5/4/2018 2:00 PM [facility]   5/8/2018 2:00 PM Mohan Swanson PT, DPT [facility]   5/10/2018 1:00 PM Mohan Swanson PT, DPT [facility]   5/15/2018 4:00 PM Mohan Swanson PT, DPT [facility]   5/17/2018 1:30 PM Cirilo Evans [place] DPT [facility]

## 2018-05-04 ENCOUNTER — HOSPITAL ENCOUNTER (OUTPATIENT)
Dept: PHYSICAL THERAPY | Age: 48
Discharge: HOME | End: 2018-05-04
Payer: COMMERCIAL

## 2018-05-04 PROCEDURE — 97110 THERAPEUTIC EXERCISES: CPT

## 2018-05-04 NOTE — PROGRESS NOTES
PT DAILY TREATMENT NOTE     Patient Name: Merlin Marroquin  Date:2018  : 1970  [x]  Patient  Verified  Payor: Joel Mcguire / Plan: Roshan Sylvester / Product Type: PPO /    In time:1:55  Out time:2:25  Total Treatment Time (min): 30  Visit #: 15 of 16    Treatment Area: Trochanteric bursitis, left hip [M70.62]    SUBJECTIVE  Pain Level (0-10 scale): 4/10  Any medication changes, allergies to medications, adverse drug reactions, diagnosis change, or new procedure performed?: [x] No    [] Yes (see summary sheet for update)  Subjective functional status/changes:   [] No changes reported  \"Same pain. \"    OBJECTIVE      30 min Therapeutic Exercise:  [x] See flow sheet :   Rationale: increase ROM, increase strength and improve coordination to improve the patients ability to return to prior level of physical activity. With   [] TE   [] TA   [] neuro   [] other: Patient Education: [x] Review HEP    [] Progressed/Changed HEP based on:   [] positioning   [] body mechanics   [] transfers   [] heat/ice application    [] other:      Other Objective/Functional Measures:      Pain Level (0-10 scale) post treatment: 4/10    ASSESSMENT/Changes in Function: Pt continues to be challenged by therex. Pt reports difficulty with therex but tolerated hipx3. No adverse from this tx. Patient will continue to benefit from skilled PT services to modify and progress therapeutic interventions, address functional mobility deficits, address ROM deficits, address strength deficits, analyze and address soft tissue restrictions, analyze and cue movement patterns, analyze and modify body mechanics/ergonomics and assess and modify postural abnormalities to attain remaining goals.      []  See Plan of Care  []  See progress note/recertification  []  See Discharge Summary         Progress towards goals / Updated goals:  Short Term Goals: To be accomplished in 2 weeks:                         Patient will report compliance with HEP 1x/day to aid in rehabilitation program.                        EUAWAT at IE: NA                        Current: Pt reports compliance every other day                            Patient will increase hip ROM to 100% in all planes to aid in completion of ADLs                        Status at IE: pain limited into ER                        Current: NT          Long Term Goals: To be accomplished in 4 weeks:                        Patient will increase B LE strength to 5/5 MMT throughout to aid in completion of ADLs.                       YVWNFL at IE: L LE grossly 4/5                        Current: NT                            Patient will report pain no greater than 1-2/10 throughout entire day to aid in completion of ADLs                        Status at IE: 10at worst                        Current: 5/10 at worst                            Patent will perform 5 sit<>stands pain-free to demonstrate improvement in status and aid and completion of ADLs.                       BNLRWI at IE: pain with 1 sit<>stand                        Current: 6/10 pain when performing sit-stand from home couch                            Patient will improve FOTO score to TBD points to demonstrate improvement in functional status.                         USQVSL at IE:TBD                        Current: 50    PLAN  [x]  Upgrade activities as tolerated     [x]  Continue plan of care  []  Update interventions per flow sheet       []  Discharge due to:_  []  Other:_      Abdelrahman Barbosa PTA 5/4/2018  2:53 PM    Future Appointments  Date Time Provider Poncho Dias   5/9/2018 12:00 PM Abdelrahman Barbosa [facility]   5/10/2018 1:00 PM Triny Villagomez PT, DPT [facility]   5/14/2018 10:30 AM Triny Villagomez PT, DPT [facility]   5/17/2018 1:30 PM Triny Villagomez PT, DPT [facility]

## 2018-05-08 ENCOUNTER — APPOINTMENT (OUTPATIENT)
Dept: PHYSICAL THERAPY | Age: 48
End: 2018-05-08
Payer: COMMERCIAL

## 2018-05-09 ENCOUNTER — HOSPITAL ENCOUNTER (OUTPATIENT)
Dept: PHYSICAL THERAPY | Age: 48
Discharge: HOME | End: 2018-05-09
Payer: COMMERCIAL

## 2018-05-09 PROCEDURE — 97110 THERAPEUTIC EXERCISES: CPT

## 2018-05-09 NOTE — PROGRESS NOTES
PT DAILY TREATMENT NOTE     Patient Name: Gray Ahumada  Date:2018  : 1970  [x]  Patient  Verified  Payor: Beatrice Mcnair / Plan: Oleg Shaffer / Product Type: PPO /    In time:11:58  Out time:12:25  Total Treatment Time (min): 27  Visit #: 16 of 16    Treatment Area: Trochanteric bursitis, left hip [M70.62]    SUBJECTIVE  Pain Level (0-10 scale): 5/10  Any medication changes, allergies to medications, adverse drug reactions, diagnosis change, or new procedure performed?: [x] No    [] Yes (see summary sheet for update)  Subjective functional status/changes:   [] No changes reported  \"My back is hurting pretty good today. \"    OBJECTIVE      27 min Therapeutic Exercise:  [x] See flow sheet :   Rationale: increase ROM, increase strength and improve coordination to improve the patients ability to perform ADLs with less pain. With   [] TE   [] TA   [] neuro   [] other: Patient Education: [x] Review HEP    [] Progressed/Changed HEP based on:   [] positioning   [] body mechanics   [] transfers   [] heat/ice application    [] other:      Other Objective/Functional Measures:      Pain Level (0-10 scale) post treatment: 5/10    ASSESSMENT/Changes in Function: Pt reported increased low back pain today. Pt limited in therex today due to back pain. Pt tx lessened and some ex held due to back pain. Pt progressing with hip pain reporting improvement. Patient will continue to benefit from skilled PT services to modify and progress therapeutic interventions, address functional mobility deficits, address ROM deficits and address strength deficits to attain remaining goals.      []  See Plan of Care  []  See progress note/recertification  []  See Discharge Summary         Progress towards goals / Updated goals:  Short Term Goals: To be accomplished in 2 weeks:                         Patient will report compliance with HEP 1x/day to aid in rehabilitation program.                        PYFCJK at IE: NA                        Current: Pt reports compliance every other day                            Patient will increase hip ROM to 100% in all planes to aid in completion of ADLs                        Status at IE: pain limited into ER                        Current: NT          Long Term Goals: To be accomplished in 4 weeks:                        Patient will increase B LE strength to 5/5 MMT throughout to aid in completion of ADLs.                       MQCKYG at IE: L LE grossly 4/5                        Current: NT                            Patient will report pain no greater than 1-2/10 throughout entire day to aid in completion of ADLs                        Status at IE: 10at worst                        Current: 5/10 at worst                            Patent will perform 5 sit<>stands pain-free to demonstrate improvement in status and aid and completion of ADLs.                       YXOTKC at IE: pain with 1 sit<>stand                        Current: 6/10 pain when performing sit-stand from home couch                            Patient will improve FOTO score to TBD points to demonstrate improvement in functional status.                         YFBNLF at IE:TBD                        Current: 50       PLAN  [x]  Upgrade activities as tolerated     [x]  Continue plan of care  []  Update interventions per flow sheet       []  Discharge due to:_  []  Other:_      Shree Raines, TERENCE 5/9/2018  12:05 PM    Future Appointments  Date Time Provider Poncho Dias   5/10/2018 1:00 PM Sean Cota PT, DPT [facility]   5/14/2018 10:30 AM Sean Cota PT, DPT [facility]   5/17/2018 1:30 PM Sean Cota PT, DPT [facility]

## 2018-05-10 ENCOUNTER — HOSPITAL ENCOUNTER (OUTPATIENT)
Dept: PHYSICAL THERAPY | Age: 48
Discharge: HOME | End: 2018-05-10
Payer: COMMERCIAL

## 2018-05-10 PROCEDURE — 97110 THERAPEUTIC EXERCISES: CPT | Performed by: PHYSICAL THERAPIST

## 2018-05-10 PROCEDURE — 97530 THERAPEUTIC ACTIVITIES: CPT | Performed by: PHYSICAL THERAPIST

## 2018-05-10 NOTE — PROGRESS NOTES
PT DAILY TREATMENT NOTE     Patient Name: Rochelle Meier  Date:5/10/2018  : 1970  [x]  Patient  Verified  Payor: Katy Villalba / Plan: [place] / Product Type: PPO /    In time:1:00  Out time:1:30  Total Treatment Time (min): 30  Visit #: 17 of 16 (add  5 today = 21)    Treatment Area: Trochanteric bursitis, left hip [M70.62]    SUBJECTIVE  Pain Level (0-10 scale): 6 in the back  Any medication changes, allergies to medications, adverse drug reactions, diagnosis change, or new procedure performed?: [x] No    [] Yes (see summary sheet for update)  Subjective functional status/changes:   [] No changes reported  Feels better. The hip hasn't really bothered her over the past few days. The pain is reduced. Now it's just the back that hurts. OBJECTIVE    15 min Therapeutic Exercise:  [x] See flow sheet :   Rationale: increase ROM, increase strength and decrease pain to improve the patients ability to complete ADLs    15 min Therapeutic Activity:  [x]  See flow sheet :   Rationale: increase ROM, increase strength and improve coordination  to improve the patients ability to complete ADLs            With   [] TE   [] TA   [] neuro   [] other: Patient Education: [x] Review HEP    [] Progressed/Changed HEP based on:   [] positioning   [] body mechanics   [] transfers   [] heat/ice application    [] other:        Pain Level (0-10 scale) post treatment: 4 In the back    ASSESSMENT/Changes in Function: Patient responds well to treatment session. Patient has made good progress to date. Pain is reduced and patient is able to utilize isometrics for analgesic effect as needed. Will continue to benefit from skilled PT services to address remaining deficits and transition to HEP.  No adverse effects were noted from today's treatment session      Patient will continue to benefit from skilled PT services to modify and progress therapeutic interventions, address functional mobility deficits, address ROM deficits, address strength deficits, analyze and address soft tissue restrictions, analyze and cue movement patterns, analyze and modify body mechanics/ergonomics and assess and modify postural abnormalities to attain remaining goals. []  See Plan of Care  []  See progress note/recertification  []  See Discharge Summary         Progress towards goals / Updated goals:  Short Term Goals: To be accomplished in 2 weeks:                         Patient will report compliance with HEP 1x/day to aid in rehabilitation program.                        YIVKKY at IE: NA                        Current: Pt reports compliance every other day                            Patient will increase hip ROM to 100% in all planes to aid in completion of ADLs                        Status at IE: pain limited into ER                        Current: NT          Long Term Goals: To be accomplished in 4 weeks:                        Patient will increase B LE strength to 5/5 MMT throughout to aid in completion of ADLs.                       VRRUGV at IE: L LE grossly 4/5                        Current: NT                            Patient will report pain no greater than 1-2/10 throughout entire day to aid in completion of ADLs                        Status at IE: 10at worst                        Current: 5/10 at worst                            Patent will perform 5 sit<>stands pain-free to demonstrate improvement in status and aid and completion of ADLs.                       RNQQDV at IE: pain with 1 sit<>stand                        Current: 6/10 pain when performing sit-stand from home couch                            Patient will improve FOTO score to TBD points to demonstrate improvement in functional status.                         TECTEP at IE:TBD                        Current: 50    PLAN  []  Upgrade activities as tolerated     [x]  Continue plan of care  []  Update interventions per flow sheet       []  Discharge due to:_  []  Other:_      Linda Scanlon PT, ISIDORO 5/10/2018  1:10 PM    Future Appointments  Date Time Provider Poncho Dias   5/14/2018 10:30 AM Linda Scanlon PT, DPT [facility]   5/17/2018 1:30 PM Linda Scanlon PT, DPT [facility]

## 2018-05-10 NOTE — PROGRESS NOTES
[facility] at [address] [facility], [address]  Ph [phone]  Fx [phone]    Physical Therapy Progress Note  Patient name: Manuela Tracy Start of Care: 2018   Referral source: Hussain Mora MD : 1970   Medical/Treatment Diagnosis: Trochanteric bursitis, left hip [M70.62] Onset Date:8-9months     Prior Hospitalization: see medical history Provider#: [id number]   Medications: Verified on Patient Summary List    Comorbidities: osteoporosis, HTN, asthma, scoliosis, tobacco use  Prior Level of Function:indepedent w/ ADLs    Visits from Start of Care: 17    Missed Visits: 0    Established Goals:         Excellent           Good         Limited           None  [x] Increased Strength   []  [x]  []  []  [x] Increased Mobility   []  [x]  []  []   [x] Decreased Pain   []  [x]  []  []    Key Functional Changes: MMT: 5/5 in Left hip (from 4/5 with pain)  Updated Goals: to be achieved in 2 weeks, continue unmet goals    Long Term Goals: To be accomplished in 2 weeks:                        Patient will increase B LE strength to 5/5 MMT throughout to aid in completion of ADLs.                       JYWEZN at IE: L LE grossly 4/5                        Current: 5/5  Met 5/10/2018                            Patient will report pain no greater than 1-2/10 throughout entire day to aid in completion of ADLs                        Status at IE: 10at worst                        Current: no pain in hip at rest, but pain with activity progressing 5/10/2018                            Patent will perform 5 sit<>stands pain-free to demonstrate improvement in status and aid and completion of ADLs.                         DJYAVZ at IE: pain with 1 sit<>stand                        Current: 6/10 pain when performing sit-stand from home couch progressing 5/10/2018                            Patient will improve FOTO score to 59 points to demonstrate improvement in functional status.                         YNVCDX at IE:50                        YWOYFRX: 26, progressing 5/10/2018    ASSESSMENT/RECOMMENDATIONS:  [x]Continue therapy per initial plan/protocol at a frequency of  2 x per week for 2 weeks  []Continue therapy with the following recommended changes:_____________________      _____________________________________________________________________  []Discontinue therapy progressing towards or have reached established goals  []Discontinue therapy due to lack of appreciable progress towards goals  []Discontinue therapy due to lack of attendance or compliance  []Await Physician's recommendations/decisions regarding therapy  []Other:________________________________________________________________    Thank you for this referral.   Juno Solitario PT, DPT 5/10/2018 3:44 PM    NOTE TO PHYSICIAN:  PLEASE COMPLETE THE ORDERS BELOW AND   FAX TO [facility]: [phone]  If you are unable to process this request in 24 hours please contact our office: [phone]      ____ I have read the above report and request that my patient continue therapy with the following changes/special instructions:  ____ I have read the above report and request that my patient be discharged from therapy    Physician's Signature:_____________________ Date:___________Time:__________

## 2018-05-14 ENCOUNTER — HOSPITAL ENCOUNTER (OUTPATIENT)
Dept: PHYSICAL THERAPY | Age: 48
Discharge: HOME | End: 2018-05-14
Payer: COMMERCIAL

## 2018-05-14 PROCEDURE — 97110 THERAPEUTIC EXERCISES: CPT

## 2018-05-15 ENCOUNTER — APPOINTMENT (OUTPATIENT)
Dept: PHYSICAL THERAPY | Age: 48
End: 2018-05-15
Payer: COMMERCIAL

## 2018-05-17 ENCOUNTER — HOSPITAL ENCOUNTER (OUTPATIENT)
Dept: PHYSICAL THERAPY | Age: 48
Discharge: HOME | End: 2018-05-17
Payer: COMMERCIAL

## 2018-05-17 PROCEDURE — 97110 THERAPEUTIC EXERCISES: CPT | Performed by: PHYSICAL THERAPIST

## 2018-05-17 NOTE — PROGRESS NOTES
PT DAILY TREATMENT NOTE     Patient Name: Sukh Delgado  Date:2018  : 1970  [x]  Patient  Verified  Payor: Raghav Manuel / Plan: Omega Lucas / Product Type: PPO /    In time:1:28  Out time:1:52  Total Treatment Time (min): 24  Visit #: 23 of 19    Treatment Area: Trochanteric bursitis, left hip [M70.62]    SUBJECTIVE  Pain Level (0-10 scale): 2-3  Any medication changes, allergies to medications, adverse drug reactions, diagnosis change, or new procedure performed?: [x] No    [] Yes (see summary sheet for update)  Subjective functional status/changes:   [] No changes reported  Feeling much better. OBJECTIVE    24 min Therapeutic Exercise:  [x] See flow sheet :   Rationale: increase ROM, increase strength and decrease pain to improve the patients ability to complete ADLs    Access Code: [email or web address]   URL: [email or web address]   Date: 2018   Prepared by: Marshall Rivera     Exercises   Squat with Chair Touch - 10 reps - 2 sets - 3x weekly   Supine Bridge - 10 reps - 2 sets - 3x weekly   Hip Abduction with Resistance Loop - 10 reps - 2 sets - 3x weekly   Standing Hip Extension Kicks - 10 reps - 2 sets - 3x weekly   Clamshell - 10 reps - 2 sets - 3x weekly   Standing Hip Flexion with Resistance Loop - 10 reps - 2 sets - 3x weekly          With   [] TE   [] TA   [] neuro   [] other: Patient Education: [x] Review HEP    [] Progressed/Changed HEP based on:   [] positioning   [] body mechanics   [] transfers   [] heat/ice application    [] other:      Other Objective/Functional Measures:   MMT: 5/5 in B hips     Pain Level (0-10 scale) post treatment: 0    ASSESSMENT/Changes in Function: Patient responds well to treatment session. Patient has made good progress to date. The hip tendon pain is much reduced at this time. Patient is capable of continuing via independent HEP at this time. No adverse effects were noted from today's treatment session    .      []  See Plan of Care  []  See progress note/recertification  [x]  See Discharge Summary         Progress towards goals / Updated goals:  Long Term Goals: To be accomplished in 2 weeks:                        Patient will increase B LE strength to 5/5 MMT throughout to aid in completion of ADLs.                       VHHWBR at IE: L LE grossly 4/5                        Current: 5/5  Met 5/10/2018                            Patient will report pain no greater than 1-2/10 throughout entire day to aid in completion of ADLs                        Status at IE: 10at worst                        Current:1-2. Met 5/17/2018                            Patent will perform 5 sit<>stands pain-free to demonstrate improvement in status and aid and completion of ADLs.                       UJEOMP at IE: pain with 1 sit<>stand                        Current: 5 with no pain, met 5/17/2018                            Patient will improve FOTO score to 59 points to demonstrate improvement in functional status.                       LRGFAJ at IE:50                        WQJXDWO: 45, met 5/17/2018    PLAN  []  Upgrade activities as tolerated     []  Continue plan of care  []  Update interventions per flow sheet       [x]  Discharge due to:_ meeting goals  []  Other:_      Anne Mccollum, PT, DPT 5/17/2018  1:30 PM    No future appointments.

## 2018-05-17 NOTE — PROGRESS NOTES
[facility] at [address] [facility], [address]  Ph [phone]  Fx [phone]    Physical Therapy Discharge Summary    Patient name: Osiel Prince Start of Care: 2019   Referral source: Radha Ackerman MD : 1970   Medical/Treatment Diagnosis: Trochanteric bursitis, left hip [M70.62] Onset Date:7-8months     Prior Hospitalization: see medical history Provider#: [id number]   Medications: Verified on Patient Summary List    Comorbidities: osteoporosis, HTN, asthma, scoliosis, tobacco use  Prior Level of Function:independent w/ ADLs    Visits from Start of Care: 19    Missed Visits: 0    Reporting Period : 2018 to 2018    Summary of Care:  [address] [place] [address], [place] be accomplished in 2 weeks:                        Patient will increase B LE strength to 5/5 MMT throughout to aid in completion of ADLs.                       PFLWUN at IE: L LE grossly 4/5                        Current: 5/5  Met 5/10/2018                            Patient will report pain no greater than 1-2/10 throughout entire day to aid in completion of ADLs                        Status at IE: 10at worst                        Current:1-2. Met 2018                            Patent will perform 5 sit<>stands pain-free to demonstrate improvement in status and aid and completion of ADLs.                       MNWZRW at IE: pain with 1 sit<>stand                        Current: 5 with no pain, met 2018                            Patient will improve FOTO score to 59 points to demonstrate improvement in functional status.                         YZAORX at IE:50                        KRXKCMT: 14, met 2018         ASSESSMENT/RECOMMENDATIONS:  [x]Discontinue therapy: [x]Patient has reached or is progressing toward set goals      []Patient is non-compliant or has abdicated      []Due to lack of appreciable progress towards set goals    Emiliana Hua, PT, DPT 2018 5:17 PM

## 2018-06-15 RX ORDER — TOPIRAMATE 25 MG/1
TABLET ORAL
Qty: 180 TAB | Refills: 1 | Status: SHIPPED | OUTPATIENT
Start: 2018-06-15

## 2018-06-15 NOTE — TELEPHONE ENCOUNTER
-called patient and voicemail did not identify patient. A message was left for patient to call [phone] at [address] [place] [address] in regards to the patient's message. For patient to know:  -patients medication sent to pharmacy, no further refills until follow up appointment for medication evaluation is made.

## 2020-10-27 ENCOUNTER — APPOINTMENT (OUTPATIENT)
Dept: NON INVASIVE DIAGNOSTICS | Age: 50
DRG: 287 | End: 2020-10-27
Attending: INTERNAL MEDICINE
Payer: COMMERCIAL

## 2020-10-27 ENCOUNTER — APPOINTMENT (OUTPATIENT)
Dept: GENERAL RADIOLOGY | Age: 50
DRG: 287 | End: 2020-10-27
Attending: EMERGENCY MEDICINE
Payer: COMMERCIAL

## 2020-10-27 ENCOUNTER — HOSPITAL ENCOUNTER (INPATIENT)
Age: 50
LOS: 1 days | Discharge: HOME | DRG: 287 | End: 2020-10-29
Attending: EMERGENCY MEDICINE | Admitting: FAMILY MEDICINE
Payer: COMMERCIAL

## 2020-10-27 DIAGNOSIS — R07.9 CHEST PAIN: ICD-10-CM

## 2020-10-27 DIAGNOSIS — R07.9 ACUTE CHEST PAIN: Primary | ICD-10-CM

## 2020-10-27 DIAGNOSIS — R06.02 SOBOE (SHORTNESS OF BREATH ON EXERTION): ICD-10-CM

## 2020-10-27 DIAGNOSIS — R07.2 PRECORDIAL CHEST PAIN: ICD-10-CM

## 2020-10-27 PROBLEM — Z72.0 TOBACCO ABUSE: Status: ACTIVE | Noted: 2020-10-27

## 2020-10-27 PROBLEM — K21.9 GERD (GASTROESOPHAGEAL REFLUX DISEASE): Status: ACTIVE | Noted: 2020-10-27

## 2020-10-27 PROBLEM — F41.1 GAD (GENERALIZED ANXIETY DISORDER): Status: ACTIVE | Noted: 2020-10-27

## 2020-10-27 LAB
ALBUMIN SERPL-MCNC: 4.1 G/DL (ref 3.4–5)
ALBUMIN/GLOB SERPL: 1.1 {RATIO} (ref 0.8–1.7)
ALP SERPL-CCNC: 68 U/L (ref 45–117)
ALT SERPL-CCNC: 36 U/L (ref 13–56)
ANION GAP SERPL CALC-SCNC: 7 MMOL/L (ref 3–18)
AST SERPL-CCNC: 28 U/L (ref 10–38)
ATRIAL RATE: 90 BPM
AV VELOCITY RATIO: 0.75
AV VTI RATIO: 0.8
BASOPHILS # BLD: 0 K/UL (ref 0–0.1)
BASOPHILS NFR BLD: 0 % (ref 0–2)
BILIRUB SERPL-MCNC: 0.6 MG/DL (ref 0.2–1)
BUN SERPL-MCNC: 15 MG/DL (ref 7–18)
BUN/CREAT SERPL: 16 (ref 12–20)
CALCIUM SERPL-MCNC: 9.6 MG/DL (ref 8.5–10.1)
CALCULATED P AXIS, ECG09: 61 DEGREES
CALCULATED R AXIS, ECG10: 68 DEGREES
CALCULATED T AXIS, ECG11: 60 DEGREES
CHLORIDE SERPL-SCNC: 103 MMOL/L (ref 100–111)
CK MB CFR SERPL CALC: NORMAL % (ref 0–4)
CK MB CFR SERPL CALC: NORMAL % (ref 0–4)
CK MB SERPL-MCNC: <1 NG/ML (ref 5–25)
CK MB SERPL-MCNC: <1 NG/ML (ref 5–25)
CK SERPL-CCNC: 132 U/L (ref 26–192)
CK SERPL-CCNC: 60 U/L (ref 26–192)
CO2 SERPL-SCNC: 28 MMOL/L (ref 21–32)
CREAT SERPL-MCNC: 0.93 MG/DL (ref 0.6–1.3)
D DIMER PPP FEU-MCNC: 0.29 UG/ML(FEU)
DIAGNOSIS, 93000: NORMAL
DIFFERENTIAL METHOD BLD: ABNORMAL
ECHO AV ANNULUS DIAM: 3.03 CM
ECHO AV AREA PEAK VELOCITY: 2.36 CM2
ECHO AV AREA VTI: 2.59 CM2
ECHO AV AREA/BSA PEAK VELOCITY: 1.3 CM2/M2
ECHO AV AREA/BSA VTI: 1.5 CM2/M2
ECHO AV MEAN GRADIENT: 2.44 MMHG
ECHO AV MEAN VELOCITY: 0.72 M/S
ECHO AV PEAK GRADIENT: 4.73 MMHG
ECHO AV PEAK VELOCITY: 109 CM/S
ECHO AV VTI: 18.27 CM
ECHO IVC PROX: 0.77 CM
ECHO LA AREA 2C: 13.42 CM2
ECHO LA AREA 4C: 9 CM2
ECHO LA MAJOR AXIS: 3.28 CM
ECHO LA MINOR AXIS: 1.84 CM
ECHO LA VOL 2C: 30.81 ML (ref 22–52)
ECHO LA VOL 4C: 17.12 ML (ref 22–52)
ECHO LA VOL BP: 25.71 ML (ref 22–52)
ECHO LV E' LATERAL VELOCITY: 9 CM/S
ECHO LV E' SEPTAL VELOCITY: 5 CM/S
ECHO LV EDV A2C: 53.26 ML
ECHO LV EDV A4C: 75.67 ML
ECHO LV EDV BP: 65.6 ML (ref 56–104)
ECHO LV EDV TEICHHOLZ: 0.48 ML
ECHO LV EJECTION FRACTION A2C: 52 %
ECHO LV EJECTION FRACTION A4C: 57 %
ECHO LV EJECTION FRACTION BIPLANE: 56.1 % (ref 55–100)
ECHO LV ESV A2C: 25.43 ML
ECHO LV ESV A4C: 32.76 ML
ECHO LV ESV BP: 28.79 ML (ref 19–49)
ECHO LV ESV TEICHHOLZ: 0.21 ML
ECHO LV INTERNAL DIMENSION DIASTOLIC: 4.19 CM (ref 3.9–5.3)
ECHO LV INTERNAL DIMENSION SYSTOLIC: 2.98 CM
ECHO LV IVSD: 0.7 CM (ref 0.6–0.9)
ECHO LV MASS 2D: 97.6 G (ref 67–162)
ECHO LV MASS INDEX 2D: 54.8 G/M2 (ref 43–95)
ECHO LV POSTERIOR WALL DIASTOLIC: 0.86 CM (ref 0.6–0.9)
ECHO LV POSTERIOR WALL SYSTOLIC: 0 CM
ECHO LVOT CARDIAC OUTPUT: 3.67 L/MIN
ECHO LVOT DIAM: 2 CM
ECHO LVOT PEAK GRADIENT: 2.64 MMHG
ECHO LVOT PEAK VELOCITY: 81 CM/S
ECHO LVOT SV: 47.3 ML
ECHO LVOT VTI: 14.98 CM
ECHO MV A VELOCITY: 70 CM/S
ECHO MV AREA PHT: 3.57 CM2
ECHO MV E DECELERATION TIME (DT): 212.48 MS
ECHO MV E VELOCITY: 44 CM/S
ECHO MV E/A RATIO: 0.63
ECHO MV E/E' LATERAL: 4.89
ECHO MV E/E' RATIO (AVERAGED): 6.84
ECHO MV E/E' SEPTAL: 8.8
ECHO MV PRESSURE HALF TIME (PHT): 61.62 MS
ECHO RA AREA 4C: 7.95 CM2
ECHO RA VOLUME: 14.3 ML
ECHO RV INTERNAL DIMENSION: 2.72 CM
ECHO RV TAPSE: 1.2 CM (ref 1.5–2)
EOSINOPHIL # BLD: 0.1 K/UL (ref 0–0.4)
EOSINOPHIL NFR BLD: 1 % (ref 0–5)
ERYTHROCYTE [DISTWIDTH] IN BLOOD BY AUTOMATED COUNT: 12.6 % (ref 11.6–14.5)
GLOBULIN SER CALC-MCNC: 3.6 G/DL (ref 2–4)
GLUCOSE SERPL-MCNC: 96 MG/DL (ref 74–99)
HCG SERPL QL: NEGATIVE
HCT VFR BLD AUTO: 45.2 % (ref 35–45)
HGB BLD-MCNC: 15.4 G/DL (ref 12–16)
LVFS 2D: 28.8 %
LVOT MG: 1.13 MMHG
LVOT MV: 0.46 CM/S
LVSV (MOD BI): 20.31 ML
LVSV (MOD SINGLE 4C): 23.67 ML
LVSV (MOD SINGLE): 15.35 ML
LVSV (TEICH): 24.03 ML
LYMPHOCYTES # BLD: 1.7 K/UL (ref 0.9–3.6)
LYMPHOCYTES NFR BLD: 20 % (ref 21–52)
MCH RBC QN AUTO: 32.7 PG (ref 24–34)
MCHC RBC AUTO-ENTMCNC: 34.1 G/DL (ref 31–37)
MCV RBC AUTO: 96 FL (ref 74–97)
MONOCYTES # BLD: 0.6 K/UL (ref 0.05–1.2)
MONOCYTES NFR BLD: 8 % (ref 3–10)
MV DEC SLOPE: 2.07
NEUTS SEG # BLD: 5.8 K/UL (ref 1.8–8)
NEUTS SEG NFR BLD: 71 % (ref 40–73)
P-R INTERVAL, ECG05: 132 MS
PLATELET # BLD AUTO: 242 K/UL (ref 135–420)
PMV BLD AUTO: 9.3 FL (ref 9.2–11.8)
POTASSIUM SERPL-SCNC: 3.8 MMOL/L (ref 3.5–5.5)
PROT SERPL-MCNC: 7.7 G/DL (ref 6.4–8.2)
Q-T INTERVAL, ECG07: 374 MS
QRS DURATION, ECG06: 82 MS
QTC CALCULATION (BEZET), ECG08: 457 MS
RBC # BLD AUTO: 4.71 M/UL (ref 4.2–5.3)
SODIUM SERPL-SCNC: 138 MMOL/L (ref 136–145)
TROPONIN I SERPL-MCNC: <0.02 NG/ML (ref 0–0.04)
TROPONIN I SERPL-MCNC: <0.02 NG/ML (ref 0–0.04)
VENTRICULAR RATE, ECG03: 90 BPM
WBC # BLD AUTO: 8.2 K/UL (ref 4.6–13.2)

## 2020-10-27 PROCEDURE — 99218 HC RM OBSERVATION: CPT

## 2020-10-27 PROCEDURE — 84703 CHORIONIC GONADOTROPIN ASSAY: CPT

## 2020-10-27 PROCEDURE — 74011000250 HC RX REV CODE- 250: Performed by: EMERGENCY MEDICINE

## 2020-10-27 PROCEDURE — 93005 ELECTROCARDIOGRAM TRACING: CPT

## 2020-10-27 PROCEDURE — 99285 EMERGENCY DEPT VISIT HI MDM: CPT

## 2020-10-27 PROCEDURE — 74011250636 HC RX REV CODE- 250/636: Performed by: EMERGENCY MEDICINE

## 2020-10-27 PROCEDURE — 96374 THER/PROPH/DIAG INJ IV PUSH: CPT

## 2020-10-27 PROCEDURE — 82550 ASSAY OF CK (CPK): CPT

## 2020-10-27 PROCEDURE — 74011250637 HC RX REV CODE- 250/637: Performed by: FAMILY MEDICINE

## 2020-10-27 PROCEDURE — 96375 TX/PRO/DX INJ NEW DRUG ADDON: CPT

## 2020-10-27 PROCEDURE — 74011000250 HC RX REV CODE- 250: Performed by: FAMILY MEDICINE

## 2020-10-27 PROCEDURE — 74011250637 HC RX REV CODE- 250/637: Performed by: EMERGENCY MEDICINE

## 2020-10-27 PROCEDURE — 85379 FIBRIN DEGRADATION QUANT: CPT

## 2020-10-27 PROCEDURE — 93306 TTE W/DOPPLER COMPLETE: CPT

## 2020-10-27 PROCEDURE — 85025 COMPLETE CBC W/AUTO DIFF WBC: CPT

## 2020-10-27 PROCEDURE — 96372 THER/PROPH/DIAG INJ SC/IM: CPT

## 2020-10-27 PROCEDURE — 74011250636 HC RX REV CODE- 250/636: Performed by: FAMILY MEDICINE

## 2020-10-27 PROCEDURE — 71045 X-RAY EXAM CHEST 1 VIEW: CPT

## 2020-10-27 PROCEDURE — 80053 COMPREHEN METABOLIC PANEL: CPT

## 2020-10-27 RX ORDER — GUAIFENESIN 100 MG/5ML
81 LIQUID (ML) ORAL DAILY
Status: DISCONTINUED | OUTPATIENT
Start: 2020-10-28 | End: 2020-10-29 | Stop reason: HOSPADM

## 2020-10-27 RX ORDER — CALCIUM CARBONATE 200(500)MG
200 TABLET,CHEWABLE ORAL
Status: DISCONTINUED | OUTPATIENT
Start: 2020-10-27 | End: 2020-10-29 | Stop reason: HOSPADM

## 2020-10-27 RX ORDER — CITALOPRAM 20 MG/1
20 TABLET, FILM COATED ORAL DAILY
Status: DISCONTINUED | OUTPATIENT
Start: 2020-10-28 | End: 2020-10-29 | Stop reason: HOSPADM

## 2020-10-27 RX ORDER — AMLODIPINE BESYLATE 5 MG/1
5 TABLET ORAL DAILY
Status: DISCONTINUED | OUTPATIENT
Start: 2020-10-28 | End: 2020-10-29 | Stop reason: HOSPADM

## 2020-10-27 RX ORDER — MORPHINE SULFATE 10 MG/ML
4 INJECTION, SOLUTION INTRAMUSCULAR; INTRAVENOUS
Status: DISCONTINUED | OUTPATIENT
Start: 2020-10-27 | End: 2020-10-29 | Stop reason: HOSPADM

## 2020-10-27 RX ORDER — ENOXAPARIN SODIUM 100 MG/ML
1 INJECTION SUBCUTANEOUS
Status: COMPLETED | OUTPATIENT
Start: 2020-10-27 | End: 2020-10-27

## 2020-10-27 RX ORDER — LORAZEPAM 2 MG/ML
1 INJECTION INTRAMUSCULAR
Status: DISCONTINUED | OUTPATIENT
Start: 2020-10-27 | End: 2020-10-29 | Stop reason: HOSPADM

## 2020-10-27 RX ORDER — NITROGLYCERIN 0.4 MG/1
0.4 TABLET SUBLINGUAL
Status: DISCONTINUED | OUTPATIENT
Start: 2020-10-27 | End: 2020-10-29 | Stop reason: HOSPADM

## 2020-10-27 RX ORDER — OXYCODONE HYDROCHLORIDE 5 MG/1
5 TABLET ORAL
Status: DISCONTINUED | OUTPATIENT
Start: 2020-10-27 | End: 2020-10-29 | Stop reason: HOSPADM

## 2020-10-27 RX ORDER — MAG HYDROX/ALUMINUM HYD/SIMETH 200-200-20
30 SUSPENSION, ORAL (FINAL DOSE FORM) ORAL
Status: DISCONTINUED | OUTPATIENT
Start: 2020-10-27 | End: 2020-10-29 | Stop reason: HOSPADM

## 2020-10-27 RX ORDER — GUAIFENESIN 100 MG/5ML
324 LIQUID (ML) ORAL
Status: COMPLETED | OUTPATIENT
Start: 2020-10-27 | End: 2020-10-27

## 2020-10-27 RX ORDER — ALPRAZOLAM 0.5 MG/1
0.5 TABLET ORAL
Status: COMPLETED | OUTPATIENT
Start: 2020-10-27 | End: 2020-10-27

## 2020-10-27 RX ADMIN — ASPIRIN 324 MG: 81 TABLET, CHEWABLE ORAL at 09:34

## 2020-10-27 RX ADMIN — NITROGLYCERIN 0.4 MG: 0.4 TABLET, ORALLY DISINTEGRATING SUBLINGUAL at 13:03

## 2020-10-27 RX ADMIN — ANTACID TABLETS 200 MG: 500 TABLET, CHEWABLE ORAL at 14:22

## 2020-10-27 RX ADMIN — LIDOCAINE HYDROCHLORIDE 40 ML: 20 SOLUTION ORAL; TOPICAL at 09:34

## 2020-10-27 RX ADMIN — ANTACID TABLETS 200 MG: 500 TABLET, CHEWABLE ORAL at 22:45

## 2020-10-27 RX ADMIN — ANTACID TABLETS 200 MG: 500 TABLET, CHEWABLE ORAL at 17:30

## 2020-10-27 RX ADMIN — LORAZEPAM 1 MG: 2 INJECTION INTRAMUSCULAR; INTRAVENOUS at 22:46

## 2020-10-27 RX ADMIN — MORPHINE SULFATE 4 MG: 10 INJECTION INTRAVENOUS at 13:17

## 2020-10-27 RX ADMIN — ALPRAZOLAM 0.5 MG: 0.5 TABLET ORAL at 10:45

## 2020-10-27 RX ADMIN — ENOXAPARIN SODIUM 70 MG: 80 INJECTION SUBCUTANEOUS at 10:46

## 2020-10-27 RX ADMIN — LIDOCAINE HYDROCHLORIDE 40 ML: 20 SOLUTION ORAL; TOPICAL at 16:04

## 2020-10-27 NOTE — ED NOTES
TRANSFER - OUT REPORT:    Verbal report given to Jacque JAMISON(name) on [address]  being transferred to Telemetry(unit) for ordered procedure       Report consisted of patients Situation, Background, Assessment and   Recommendations(SBAR). Information from the following report(s) SBAR, Kardex, ED Summary, MAR, Recent Results and Cardiac Rhythm sinus rhythm was reviewed with the receiving nurse. Lines:   Peripheral IV 10/27/20 Right Antecubital (Active)   Site Assessment Clean, dry, & intact 10/27/20 1600   Phlebitis Assessment 0 10/27/20 1600   Infiltration Assessment 0 10/27/20 1600   Dressing Status Clean, dry, & intact 10/27/20 1600   Dressing Type Transparent 10/27/20 1600   Hub Color/Line Status [place] 10/27/20 1600   Alcohol Cap Used Yes 10/27/20 1600        Opportunity for questions and clarification was provided.       Patient transported with:   Monitor  Registered Nurse

## 2020-10-27 NOTE — H&P
History & Physical    Patient: Melissa Devine MRN: [id number]  CSN: [id number]    YOB: 1970  Age: 47 y.o. Sex: female      DOA: 10/27/2020  Primary Care Provider:  Francine Rodriguez MD      Assessment/Plan   Melissa Devine is a 47 y.o. female with past medical history of active tobacco abuse who presents to the emergency department complaining of chest pain. Acute chest pain -  Admit to telemetry for observation  Cardiology consulted, Dr. Emiliana Treadwell started in the emergency room per Dr. Denver Vicente  Echocardiogram today per Dr. Denver Vicente, likely stress test tomorrow  Aspirin and GI cocktail gave in the emergency room  Will order as needed nitro and morphine for chest pain  Trend Cardiac enzymes  Ativan prn    GERD -  GI cocktail x2  mylanta prn   Order home omeprazole 80mg every day     SEEMA -  Resume home citalopram     Hypertension -  Resume home Norvasc 5mg po qd    Tobacco abuse -  Advised patient on permanent cessation    DVT/GI prophylaxis -ordered    Patient Active Problem List   Diagnosis Code    Hypertension I10    Excessive or frequent menstruation N92.0    Dysmenorrhea N94.6    Chest pain R07.9    Bulging lumbar disc M51.26    Lumbar facet arthropathy M47.816    Neuritis M79.2    Myofascial pain M79.18    Cervical facet joint syndrome M47.812    Pain of left hip joint M25.552    Left foot drop M21.372    Primary osteoarthritis of left hip M16.12     Estimated length of stay : 2-3 days    CC: chest pain       HPI:     Melissa Devine is a 47 y.o. female with past medical history of active tobacco use who presents to the emergency department C/O chest pain. Per patient the chest pain started 10 days ago and is sharp and constant. She states is located on the left side of her chest and is constant without clear relieving or exacerbating factors but radiates to her left shoulder and down left arm. She states that it has been getting worse over the 10 days.   She states is associated with shortness of breath that has also been getting worse over the 10 days. She denies any fever, calf swelling, recent travel, sick contacts, bowel or urinary complaints. She has been seen this week by her primary care doctor and cardiology who had scheduled an echo and stress test but these have not yet occurred. She does have significant family history of heart disease in her father and paternal grandfather. Father had a MI at 39 and 61 as well as a CVA and grandfather had an MI in his 76s.      Past Medical History:   Diagnosis Date    Anxiety     Arthritis     Asthma     pt states she does not have asthma    Chronic pain     Chronic Back Pain     Depression     Gastrointestinal disorder     GERD (gastroesophageal reflux disease)     well controlled w/ meds    Hypertension     no medications currently; off [id number]    IBS (irritable bowel syndrome)     Other ill-defined conditions(799.89)     bulging disc    Other ill-defined conditions(799.89)     sinusitis    Tattoo     TATTOO       Past Surgical History:   Procedure Laterality Date    ENDOSCOPY, COLON, DIAGNOSTIC      HX BREAST BIOPSY      left    HX CHOLECYSTECTOMY  2016    HX CYST INCISION AND DRAINAGE      left    HX GYN  2011    cysts removed from both ovaries    HX HYSTERECTOMY      partial     LAP,CHOLECYSTECTOMY  2-25-16    Dr. Anju Vieira       Family History   Problem Relation Age of Onset    Breast Cancer Mother     Coronary Artery Disease Other     Breast Cancer Sister        Social History     Socioeconomic History    Marital status:      Spouse name: Not on file    Number of children: Not on file    Years of education: Not on file    Highest education level: Not on file   Tobacco Use    Smoking status: Current Every Day Smoker     Packs/day: 1.00    Smokeless tobacco: Never Used   Substance and Sexual Activity    Alcohol use: No    Drug use: No   Other Topics Concern       Prior to Admission medications Medication Sig Start Date End Date Taking? Authorizing Provider   topiramate (TOPAMAX) 25 mg tablet TAKE 3 TABS BY MOUTH TWO (2) TIMES DAILY (WITH MEALS). 6/15/18   Crystal Norris NP   citalopram (CELEXA) 10 mg tablet TAKE 1 TABLET BY ORAL ROUTE EVERY DAY 12/19/17   Provider, Historical   montelukast (SINGULAIR) 10 mg tablet TAKE 1 TABLET BY ORAL ROUTE EVERY DAY IN THE EVENING 12/27/17   Provider, Historical   baclofen (LIORESAL) 10 mg tablet Take 1 Tab by mouth three (3) times daily as needed. 1/25/18   Crystal Norris NP   HYDROcodone-acetaminophen (NORCO) 7.5-325 mg per tablet Take 1 Tab by mouth every six (6) hours as needed for Pain (for severe pain). Max Daily Amount: 4 Tabs. 1/25/18   Crystal Norris NP   albuterol (ACCUNEB) 1.25 mg/3 mL nebu Take 3 mL by inhalation every four (4) hours as needed (wheezing). 10/12/17   Rochelle Melvin PA   albuterol (PROVENTIL HFA, VENTOLIN HFA, PROAIR HFA) 90 mcg/actuation inhaler Take 2 Puffs by inhalation every four (4) hours as needed for Wheezing. 10/12/17   Rochelle Melvin PA   Nebulizer & Compressor machine DX: Bronchial Asthma 10/12/17   Rochelle Melvin PA   Nebulizer Accessories kit Use as directed 10/12/17   Rochelle Melvin PA   SYMBICORT 160-4.5 mcg/actuation HFA inhaler inhale 2 puffs by mouth twice a day IN THE MORNING AND EVENING 7/15/17   Provider, Historical   dicyclomine (BENTYL) 10 mg capsule take 1 capsule by mouth three times a day 7/7/17   Provider, Historical   famotidine (PEPCID) 40 mg tablet Take 40 mg by mouth daily. Provider, Historical   celecoxib (CELEBREX) 100 mg capsule Take  by mouth two (2) times a day. Provider, Historical   losartan (COZAAR) 100 mg tablet Take 100 mg by mouth daily. Other, MD Silvino   amLODIPine (NORVASC) 5 mg tablet Take 5 mg by mouth daily. Other, MD Silvino   ALPRAZolam Anya Kesha) 0.5 mg tablet Take 1 Tab by mouth nightly as needed for Anxiety.  11/14/13   Char Schwab, MD   cholecalciferol (VITAMIN D3) 1,000 unit tablet Take 3,000 Units by mouth daily. Provider, Historical   cetirizine (ZYRTEC) 10 mg tablet Take  by mouth daily as needed. Provider, Historical       Allergies   Allergen Reactions    Percocet [Oxycodone-Acetaminophen] Nausea and Vomiting    Advair Diskus [Fluticasone Propion-Salmeterol] Nausea and Vomiting    Ibuprofen Nausea and Vomiting    Tylenol [Acetaminophen] Other (comments)     Causes upset stomach       Review of Systems  Gen: No fever, chills, malaise, weight loss/gain. Heent: No headache, rhinorrhea, epistaxis, ear pain, hearing loss, sinus pain, neck pain/stiffness, sore throat. Heart: +chest pain, no palpitations, WHITESIDE, pnd, or orthopnea. Resp: No cough, hemoptysis, wheezing and shortness of breath. GI: No nausea, vomiting, diarrhea, constipation, melena or hematochezia. : No urinary obstruction, dysuria or hematuria. Derm: No rash, new skin lesion or pruritis. Musc/skeletal: no bone or joint complains. Vasc: No edema, cyanosis or claudication. Endo: No heat/cold intolerance, no polyuria,polydipsia or polyphagia. Neuro: No unilateral weakness, numbness, tingling. No seizures. Heme: No easy bruising or bleeding. Physical Exam:     Physical Exam:  Visit Vitals  /79   Pulse 79   Temp 97.8 °F (36.6 °C)   Resp 25   Ht 5' 4\" (1.626 m)   Wt 72.6 kg (160 lb)   LMP 2013   SpO2 95%   BMI 27.46 kg/m²           Temp (24hrs), Av.8 °F (36.6 °C), Min:97.8 °F (36.6 °C), Max:97.8 °F (36.6 °C)    No intake/output data recorded. No intake/output data recorded. General:  Awake, cooperative, no distress. Head:  Normocephalic, without obvious abnormality, atraumatic. Eyes:  Conjunctivae/corneas clear, sclera anicteric, PERRL, EOMs intact. Nose: Nares normal. No drainage or sinus tenderness. Throat: Lips, mucosa, and tongue normal.    Neck: Supple, symmetrical, trachea midline, no adenopathy.        Lungs:   Clear to auscultation bilaterally. Heart:  Regular rate and rhythm, S1, S2 normal, no murmur, click, rub or gallop. Abdomen: Soft, non-tender. Bowel sounds normal. No masses,  No organomegaly. Extremities: Extremities normal, atraumatic, no cyanosis or edema. Capillary refill normal.   Pulses: 2+ and symmetric all extremities. Skin: Skin color as per ethnicity, turgor normal. No rashes or lesions   Neurologic: CNII-XII intact. No focal motor or sensory deficit. Labs Reviewed:    Recent Results (from the past 24 hour(s))   EKG, 12 LEAD, INITIAL    Collection Time: 10/27/20  9:17 AM   Result Value Ref Range    Ventricular Rate 90 BPM    Atrial Rate 90 BPM    P-R Interval 132 ms    QRS Duration 82 ms    Q-T Interval 374 ms    QTC Calculation (Bezet) 457 ms    Calculated P Axis 61 degrees    Calculated R Axis 68 degrees    Calculated T Axis 60 degrees    Diagnosis       Normal sinus rhythm  Normal ECG  When compared with ECG of 23-JUN-2017 11:25,  Criteria for Anterior infarct are no longer present  T wave amplitude has decreased in Lateral leads     CBC WITH AUTOMATED DIFF    Collection Time: 10/27/20  9:30 AM   Result Value Ref Range    WBC 8.2 4.6 - 13.2 K/uL    RBC 4.71 4.20 - 5.30 M/uL    HGB 15.4 12.0 - 16.0 g/dL    HCT 45.2 (H) 35.0 - 45.0 %    MCV 96.0 74.0 - 97.0 FL    MCH 32.7 24.0 - 34.0 PG    MCHC 34.1 31.0 - 37.0 g/dL    RDW 12.6 11.6 - 14.5 %    PLATELET 016 916 - 879 K/uL    MPV 9.3 9.2 - 11.8 FL    NEUTROPHILS 71 40 - 73 %    LYMPHOCYTES 20 (L) 21 - 52 %    MONOCYTES 8 3 - 10 %    EOSINOPHILS 1 0 - 5 %    BASOPHILS 0 0 - 2 %    ABS. NEUTROPHILS 5.8 1.8 - 8.0 K/UL    ABS. LYMPHOCYTES 1.7 0.9 - 3.6 K/UL    ABS. MONOCYTES 0.6 0.05 - 1.2 K/UL    ABS. EOSINOPHILS 0.1 0.0 - 0.4 K/UL    ABS.  BASOPHILS 0.0 0.0 - 0.1 K/UL    DF AUTOMATED     METABOLIC PANEL, COMPREHENSIVE    Collection Time: 10/27/20  9:30 AM   Result Value Ref Range    Sodium 138 136 - 145 mmol/L    Potassium 3.8 3.5 - 5.5 mmol/L    Chloride 103 100 - 111 mmol/L    CO2 28 21 - 32 mmol/L    Anion gap 7 3.0 - 18 mmol/L    Glucose 96 74 - 99 mg/dL    BUN 15 7.0 - 18 MG/DL    Creatinine 0.93 0.6 - 1.3 MG/DL    BUN/Creatinine ratio 16 12 - 20      GFR est AA >60 >60 ml/min/1.73m2    GFR est non-AA >60 >60 ml/min/1.73m2    Calcium 9.6 8.5 - 10.1 MG/DL    Bilirubin, total 0.6 0.2 - 1.0 MG/DL    ALT (SGPT) 36 13 - 56 U/L    AST (SGOT) 28 10 - 38 U/L    Alk.  phosphatase 68 45 - 117 U/L    Protein, total 7.7 6.4 - 8.2 g/dL    Albumin 4.1 3.4 - 5.0 g/dL    Globulin 3.6 2.0 - 4.0 g/dL    A-G Ratio 1.1 0.8 - 1.7     D DIMER    Collection Time: 10/27/20  9:30 AM   Result Value Ref Range    D DIMER 0.29 <0.46 ug/ml(FEU)   CARDIAC PANEL,(CK, CKMB & TROPONIN)    Collection Time: 10/27/20  9:30 AM   Result Value Ref Range    CK - MB <1.0 <3.6 ng/ml    CK-MB Index  0.0 - 4.0 %     CALCULATION NOT PERFORMED WHEN RESULT IS BELOW LINEAR LIMIT     26 - 192 U/L    Troponin-I, QT <0.02 0.0 - 0.045 NG/ML   HCG QL SERUM    Collection Time: 10/27/20  9:30 AM   Result Value Ref Range    HCG, Ql. Negative NEG         Procedures/imaging: see electronic medical records for all procedures/Xrays and details which were not copied into this note but were reviewed prior to creation of Plan        CC: Sosa Marques MD

## 2020-10-27 NOTE — PROGRESS NOTES
TRANSFER - IN REPORT:    Verbal report received from SUSANNA Segal RN(name) on Ruslan Cortes  being received from ED(unit) for routine progression of care      Report consisted of patients Situation, Background, Assessment and   Recommendations(SBAR). Information from the following report(s) SBAR, Kardex, ED Summary, Intake/Output, MAR and Recent Results was reviewed with the receiving nurse. Opportunity for questions and clarification was provided.

## 2020-10-27 NOTE — ED PROVIDER NOTES
EMERGENCY DEPARTMENT HISTORY AND PHYSICAL EXAM    Date: 10/27/2020  Patient Name: Eugenia Lindsey    History of Presenting Illness     Chief Complaint   Patient presents with    Chest Pain         History Provided By: Patient    9:20 AM  Eugenia Lindsey is a 47 y.o. female with PMHX of active tobacco smoking who presents to the emergency department C/O chest pain. Per patient the chest pain started 10 days ago. She states is located on the left side of her chest and is constant without clear relieving or exacerbating factors. She states it is sharp in nature and has been getting worse over the 10 days. She states is associated with shortness of breath that has also been getting worse over the 10 days. She denies any fever, calf swelling, recent travel, sick contacts, bowel or urinary complaints. She has been seen this week by her primary care doctor and cardiology who had scheduled an echo and stress test but these have not yet occurred. She does have significant family history of heart disease in her father. PCP: Amarilis Coe MD    Current Outpatient Medications   Medication Sig Dispense Refill    topiramate (TOPAMAX) 25 mg tablet TAKE 3 TABS BY MOUTH TWO (2) TIMES DAILY (WITH MEALS). 180 Tab 1    citalopram (CELEXA) 10 mg tablet TAKE 1 TABLET BY ORAL ROUTE EVERY DAY  5    montelukast (SINGULAIR) 10 mg tablet TAKE 1 TABLET BY ORAL ROUTE EVERY DAY IN THE EVENING  3    baclofen (LIORESAL) 10 mg tablet Take 1 Tab by mouth three (3) times daily as needed. 60 Tab 1    HYDROcodone-acetaminophen (NORCO) 7.5-325 mg per tablet Take 1 Tab by mouth every six (6) hours as needed for Pain (for severe pain). Max Daily Amount: 4 Tabs. 28 Tab 0    albuterol (ACCUNEB) 1.25 mg/3 mL nebu Take 3 mL by inhalation every four (4) hours as needed (wheezing). 25 Each 0    albuterol (PROVENTIL HFA, VENTOLIN HFA, PROAIR HFA) 90 mcg/actuation inhaler Take 2 Puffs by inhalation every four (4) hours as needed for Wheezing.  1 Inhaler 0    Nebulizer & Compressor machine DX: Bronchial Asthma 1 Each 0    Nebulizer Accessories kit Use as directed 1 Kit 0    SYMBICORT 160-4.5 mcg/actuation HFA inhaler inhale 2 puffs by mouth twice a day IN THE MORNING AND EVENING  0    dicyclomine (BENTYL) 10 mg capsule take 1 capsule by mouth three times a day  0    famotidine (PEPCID) 40 mg tablet Take 40 mg by mouth daily.  celecoxib (CELEBREX) 100 mg capsule Take  by mouth two (2) times a day.  losartan (COZAAR) 100 mg tablet Take 100 mg by mouth daily.  amLODIPine (NORVASC) 5 mg tablet Take 5 mg by mouth daily.  ALPRAZolam (XANAX) 0.5 mg tablet Take 1 Tab by mouth nightly as needed for Anxiety. 7 Tab 0    cholecalciferol (VITAMIN D3) 1,000 unit tablet Take 3,000 Units by mouth daily.  cetirizine (ZYRTEC) 10 mg tablet Take  by mouth daily as needed.          Past History     Past Medical History:  Past Medical History:   Diagnosis Date    Anxiety     Arthritis     Asthma     pt states she does not have asthma    Chronic pain     Chronic Back Pain     Depression     Gastrointestinal disorder     GERD (gastroesophageal reflux disease)     well controlled w/ meds    Hypertension     no medications currently; off [id number]    IBS (irritable bowel syndrome)     Other ill-defined conditions(799.89)     bulging disc    Other ill-defined conditions(799.89)     sinusitis    Tattoo     TATTOO       Past Surgical History:  Past Surgical History:   Procedure Laterality Date    ENDOSCOPY, COLON, DIAGNOSTIC      HX BREAST BIOPSY      left    HX CHOLECYSTECTOMY  2016    HX CYST INCISION AND DRAINAGE      left    HX GYN  2011    cysts removed from both ovaries    HX HYSTERECTOMY      partial     LAP,CHOLECYSTECTOMY  2-25-16    Dr. Johanne Garcia       Family History:  Family History   Problem Relation Age of Onset    Breast Cancer Mother     Coronary Artery Disease Other     Breast Cancer Sister        Social History:  Social History     Tobacco Use    Smoking status: Current Every Day Smoker     Packs/day: 1.00    Smokeless tobacco: Never Used   Substance Use Topics    Alcohol use: No    Drug use: No       Allergies: Allergies   Allergen Reactions    Percocet [Oxycodone-Acetaminophen] Nausea and Vomiting    Advair Diskus [Fluticasone Propion-Salmeterol] Nausea and Vomiting    Ibuprofen Nausea and Vomiting    Tylenol [Acetaminophen] Other (comments)     Causes upset stomach         Review of Systems   Review of Systems   Constitutional: Negative for fever. Respiratory: Positive for shortness of breath. Cardiovascular: Positive for chest pain. Gastrointestinal: Negative for vomiting. All other systems reviewed and are negative.         Physical Exam     Vitals:    10/27/20 0921 10/27/20 0928 10/27/20 1030   BP: (!) 141/75  112/79   Pulse: 90  79   Resp: 16  25   Temp: 97.8 °F (36.6 °C)     SpO2: 100% 100% 95%   Weight: 72.6 kg (160 lb)     Height: 5' 4\" (1.626 m)       Physical Exam    Nursing notes and vital signs reviewed    Constitutional: Non toxic appearing, moderate distress  Head: Normocephalic, Atraumatic  Eyes: EOMI  Neck: Supple  Cardiovascular: Regular rate and rhythm, no murmurs, rubs, or gallops  Chest: Normal work of breathing and chest excursion bilaterally  Lungs: Clear to ausculation bilaterally  Abdomen: Soft, non tender, non distended  Back: No evidence of trauma or deformity  Extremities: No evidence of trauma or deformity, no LE edema  Skin: Warm and dry, normal cap refill  Neuro: Alert and appropriate  Psychiatric: Normal mood and affect      Diagnostic Study Results     Labs -     Recent Results (from the past 12 hour(s))   EKG, 12 LEAD, INITIAL    Collection Time: 10/27/20  9:17 AM   Result Value Ref Range    Ventricular Rate 90 BPM    Atrial Rate 90 BPM    P-R Interval 132 ms    QRS Duration 82 ms    Q-T Interval 374 ms    QTC Calculation (Bezet) 457 ms    Calculated P Axis 61 degrees Calculated R Axis 68 degrees    Calculated T Axis 60 degrees    Diagnosis       Normal sinus rhythm  Normal ECG  When compared with ECG of 23-JUN-2017 11:25,  Criteria for Anterior infarct are no longer present  T wave amplitude has decreased in Lateral leads     CBC WITH AUTOMATED DIFF    Collection Time: 10/27/20  9:30 AM   Result Value Ref Range    WBC 8.2 4.6 - 13.2 K/uL    RBC 4.71 4.20 - 5.30 M/uL    HGB 15.4 12.0 - 16.0 g/dL    HCT 45.2 (H) 35.0 - 45.0 %    MCV 96.0 74.0 - 97.0 FL    MCH 32.7 24.0 - 34.0 PG    MCHC 34.1 31.0 - 37.0 g/dL    RDW 12.6 11.6 - 14.5 %    PLATELET 321 335 - 604 K/uL    MPV 9.3 9.2 - 11.8 FL    NEUTROPHILS 71 40 - 73 %    LYMPHOCYTES 20 (L) 21 - 52 %    MONOCYTES 8 3 - 10 %    EOSINOPHILS 1 0 - 5 %    BASOPHILS 0 0 - 2 %    ABS. NEUTROPHILS 5.8 1.8 - 8.0 K/UL    ABS. LYMPHOCYTES 1.7 0.9 - 3.6 K/UL    ABS. MONOCYTES 0.6 0.05 - 1.2 K/UL    ABS. EOSINOPHILS 0.1 0.0 - 0.4 K/UL    ABS. BASOPHILS 0.0 0.0 - 0.1 K/UL    DF AUTOMATED     METABOLIC PANEL, COMPREHENSIVE    Collection Time: 10/27/20  9:30 AM   Result Value Ref Range    Sodium 138 136 - 145 mmol/L    Potassium 3.8 3.5 - 5.5 mmol/L    Chloride 103 100 - 111 mmol/L    CO2 28 21 - 32 mmol/L    Anion gap 7 3.0 - 18 mmol/L    Glucose 96 74 - 99 mg/dL    BUN 15 7.0 - 18 MG/DL    Creatinine 0.93 0.6 - 1.3 MG/DL    BUN/Creatinine ratio 16 12 - 20      GFR est AA >60 >60 ml/min/1.73m2    GFR est non-AA >60 >60 ml/min/1.73m2    Calcium 9.6 8.5 - 10.1 MG/DL    Bilirubin, total 0.6 0.2 - 1.0 MG/DL    ALT (SGPT) 36 13 - 56 U/L    AST (SGOT) 28 10 - 38 U/L    Alk.  phosphatase 68 45 - 117 U/L    Protein, total 7.7 6.4 - 8.2 g/dL    Albumin 4.1 3.4 - 5.0 g/dL    Globulin 3.6 2.0 - 4.0 g/dL    A-G Ratio 1.1 0.8 - 1.7     D DIMER    Collection Time: 10/27/20  9:30 AM   Result Value Ref Range    D DIMER 0.29 <0.46 ug/ml(FEU)   CARDIAC PANEL,(CK, CKMB & TROPONIN)    Collection Time: 10/27/20  9:30 AM   Result Value Ref Range    CK - MB <1.0 <3.6 ng/ml    CK-MB Index  0.0 - 4.0 %     CALCULATION NOT PERFORMED WHEN RESULT IS BELOW LINEAR LIMIT     26 - 192 U/L    Troponin-I, QT <0.02 0.0 - 0.045 NG/ML   HCG QL SERUM    Collection Time: 10/27/20  9:30 AM   Result Value Ref Range    HCG, Ql. Negative NEG         Radiologic Studies -   XR CHEST PORT   Final Result   IMPRESSION:      No acute radiographic cardiopulmonary abnormality. CT Results  (Last 48 hours)    None        CXR Results  (Last 48 hours)               10/27/20 0949  XR CHEST PORT Final result    Impression:  IMPRESSION:       No acute radiographic cardiopulmonary abnormality. Narrative:  EXAM: XR CHEST PORT       CLINICAL INDICATION/HISTORY: chest pain   -Additional: Asthma       COMPARISON: 4/24/2018       TECHNIQUE: Frontal view of the chest       _______________       FINDINGS:       HEART AND MEDIASTINUM: Normal cardiac size and mediastinal contours. LUNGS AND PLEURAL SPACES: No focal pneumonic consolidation, pneumothorax, or   pleural effusion. BONY THORAX AND SOFT TISSUES: No acute osseous abnormality       _______________                 Medications given in the ED-  Medications   mylanta/viscous lidocaine (GI COCKTAIL) (40 mL Oral Given 10/27/20 0934)   aspirin chewable tablet 324 mg (324 mg Oral Given 10/27/20 0934)   enoxaparin (LOVENOX) injection 70 mg (70 mg SubCUTAneous Given 10/27/20 1046)   ALPRAZolam (XANAX) tablet 0.5 mg (0.5 mg Oral Given 10/27/20 1045)         Medical Decision Making   I am the first provider for this patient. I reviewed the vital signs, available nursing notes, past medical history, past surgical history, family history and social history. Vital Signs-Reviewed the patient's vital signs.     Pulse Oximetry Analysis - 100% on room air, not hypoxic    Cardiac Monitor:  Rate: 86 bpm  Rhythm: Normal sinus    EKG interpretation: (Preliminary)  EKG read by Dr. Sterling Hoff at 9:23 AM  Normal sinus rhythm at a rate of 90 bpm, NH interval of 132 ms, QRS duration of 82 ms    Records Reviewed: Nursing Notes, Old Medical Records and Previous electrocardiograms    Provider Notes (Medical Decision Making): Ilda Dial is a 47 y.o. female presenting for persistent chest pain. Wells low risk, PERC positive, D-dimer negative. Patient continues to have chest pain here in the emergency department. Initial cardiac enzymes negative. Discussed with cardiology and due to patient's risk factors will initiate Lovenox for concern of ACS and bring in patient with hospitalist for further cardiac risk stratification. Patient understands and agrees with this plan. Procedures:  Procedures    ED Course:   CONSULT NOTE:   10:35 AM  Dr. Little Ferguson spoke with Dr. Nii Feliciano   Specialty: Cardiologist  Discussed pt's hx, disposition, and available diagnostic and imaging results over the telephone. Reviewed care plans. Recommends further inpatient evaluation, start Lovenox. 10:38 AM  Updated patient on all results and plan. All questions answered. Requesting her home dose of Xanax for anxiety. CONSULT NOTE:   11:08 AM  Dr. Little Ferguson spoke with Dr. Saba Malave   Specialty: Hospitalist  Discussed pt's hx, disposition, and available diagnostic and imaging results over the telephone. Reviewed care plans. Accepts to observation on telemetry. Diagnosis and Disposition     Critical Care Time: 11:10 AM  I have spent 35 minutes of critical care time involved in lab review, consultations with specialist, family decision-making, and documentation. During this entire length of time I was immediately available to the patient. Critical Care: The reason for providing this level of medical care for this critically ill patient was due a critical illness that impaired one or more vital organ systems such that there was a high probability of imminent or life threatening deterioration in the patients condition.  This care involved high complexity decision making to assess, manipulate, and support vital system functions, to treat this degreee vital organ system failure and to prevent further life threatening deterioration of the patients condition. Core Measures:  For Hospitalized Patients:    1. Hospitalization Decision Time:  The decision to hospitalize the patient was made by Dr. James Medrano at 10:35 AM on 10/27/2020    2. Aspirin: Aspirin was given    10:35 AM  Patient is being admitted to the hospital by Dr. Gabby Payne. The results of their tests and reasons for their admission have been discussed with them and/or available family. They convey agreement and understanding for the need to be admitted and for their admission diagnosis. CONDITIONS ON ADMISSION:  Sepsis is not present at the time of admission. Deep Vein Thrombosis is not present at the time of admission. Thrombosis is not present at the time of admission. Urinary Tract Infection is not present at the time of admission. Pneumonia is not present at the time of admission. MRSA is not present at the time of admission. Wound infection is not present at the time of admission. Pressure Ulcer is not present at the time of admission. CLINICAL IMPRESSION:    1. Acute chest pain      _______________________________      Please note that this dictation was completed with [employer], the computer voice recognition software. Quite often unanticipated grammatical, syntax, homophones, and other interpretive errors are inadvertently transcribed by the computer software. Please disregard these errors. Please excuse any errors that have escaped final proofreading.

## 2020-10-27 NOTE — Clinical Note
TRANSFER - OUT REPORT:     Verbal report given to: [place]. Report consisted of patient's Situation, Background, Assessment and   Recommendations(SBAR). Opportunity for questions and clarification was provided. Patient transported with a Registered Nurse. Patient transported to: Care Unit.

## 2020-10-27 NOTE — Clinical Note
TRANSFER - IN REPORT:     Verbal report received from: care unit RN. Report consisted of patient's Situation, Background, Assessment and   Recommendations(SBAR). Opportunity for questions and clarification was provided. Assessment completed upon patient's arrival to unit and care assumed. Patient transported with a Registered Nurse.

## 2020-10-28 ENCOUNTER — APPOINTMENT (OUTPATIENT)
Dept: NUCLEAR MEDICINE | Age: 50
DRG: 287 | End: 2020-10-28
Attending: INTERNAL MEDICINE
Payer: COMMERCIAL

## 2020-10-28 ENCOUNTER — APPOINTMENT (OUTPATIENT)
Dept: NON INVASIVE DIAGNOSTICS | Age: 50
DRG: 287 | End: 2020-10-28
Attending: INTERNAL MEDICINE
Payer: COMMERCIAL

## 2020-10-28 LAB
ANION GAP SERPL CALC-SCNC: 5 MMOL/L (ref 3–18)
BUN SERPL-MCNC: 19 MG/DL (ref 7–18)
BUN/CREAT SERPL: 20 (ref 12–20)
CALCIUM SERPL-MCNC: 9.7 MG/DL (ref 8.5–10.1)
CHLORIDE SERPL-SCNC: 103 MMOL/L (ref 100–111)
CK MB CFR SERPL CALC: 1.9 % (ref 0–4)
CK MB SERPL-MCNC: 1.2 NG/ML (ref 5–25)
CK SERPL-CCNC: 62 U/L (ref 26–192)
CO2 SERPL-SCNC: 32 MMOL/L (ref 21–32)
CREAT SERPL-MCNC: 0.93 MG/DL (ref 0.6–1.3)
ERYTHROCYTE [DISTWIDTH] IN BLOOD BY AUTOMATED COUNT: 12.8 % (ref 11.6–14.5)
GLUCOSE SERPL-MCNC: 86 MG/DL (ref 74–99)
HCT VFR BLD AUTO: 41.2 % (ref 35–45)
HGB BLD-MCNC: 13.5 G/DL (ref 12–16)
MCH RBC QN AUTO: 31.9 PG (ref 24–34)
MCHC RBC AUTO-ENTMCNC: 32.8 G/DL (ref 31–37)
MCV RBC AUTO: 97.4 FL (ref 74–97)
PLATELET # BLD AUTO: 222 K/UL (ref 135–420)
PMV BLD AUTO: 9.2 FL (ref 9.2–11.8)
POTASSIUM SERPL-SCNC: 5 MMOL/L (ref 3.5–5.5)
RBC # BLD AUTO: 4.23 M/UL (ref 4.2–5.3)
SODIUM SERPL-SCNC: 140 MMOL/L (ref 136–145)
STRESS ANGINA INDEX: 2
STRESS BASELINE HR: 89 BPM
STRESS ESTIMATED WORKLOAD: 10.3 METS
STRESS EXERCISE DUR MIN: NORMAL
STRESS PEAK DIAS BP: 90 MMHG
STRESS PEAK SYS BP: 125 MMHG
STRESS PERCENT HR ACHIEVED: 87 %
STRESS POST PEAK HR: 148 BPM
STRESS RATE PRESSURE PRODUCT: NORMAL BPM*MMHG
STRESS ST DEPRESSION: 0 MM
STRESS ST ELEVATION: 0 MM
STRESS TARGET HR: 170 BPM
TROPONIN I SERPL-MCNC: <0.02 NG/ML (ref 0–0.04)
WBC # BLD AUTO: 6.4 K/UL (ref 4.6–13.2)

## 2020-10-28 PROCEDURE — 36415 COLL VENOUS BLD VENIPUNCTURE: CPT

## 2020-10-28 PROCEDURE — 74011250636 HC RX REV CODE- 250/636: Performed by: FAMILY MEDICINE

## 2020-10-28 PROCEDURE — 74011250637 HC RX REV CODE- 250/637: Performed by: FAMILY MEDICINE

## 2020-10-28 PROCEDURE — 96372 THER/PROPH/DIAG INJ SC/IM: CPT

## 2020-10-28 PROCEDURE — 93017 CV STRESS TEST TRACING ONLY: CPT

## 2020-10-28 PROCEDURE — 96376 TX/PRO/DX INJ SAME DRUG ADON: CPT

## 2020-10-28 PROCEDURE — 74011250637 HC RX REV CODE- 250/637: Performed by: INTERNAL MEDICINE

## 2020-10-28 PROCEDURE — 82550 ASSAY OF CK (CPK): CPT

## 2020-10-28 PROCEDURE — 74011250636 HC RX REV CODE- 250/636: Performed by: INTERNAL MEDICINE

## 2020-10-28 PROCEDURE — A9500 TC99M SESTAMIBI: HCPCS

## 2020-10-28 PROCEDURE — 80061 LIPID PANEL: CPT

## 2020-10-28 PROCEDURE — 80048 BASIC METABOLIC PNL TOTAL CA: CPT

## 2020-10-28 PROCEDURE — 85027 COMPLETE CBC AUTOMATED: CPT

## 2020-10-28 PROCEDURE — 99218 HC RM OBSERVATION: CPT

## 2020-10-28 RX ORDER — ATORVASTATIN CALCIUM 20 MG/1
20 TABLET, FILM COATED ORAL
Status: DISCONTINUED | OUTPATIENT
Start: 2020-10-28 | End: 2020-10-29 | Stop reason: HOSPADM

## 2020-10-28 RX ORDER — ENOXAPARIN SODIUM 100 MG/ML
1 INJECTION SUBCUTANEOUS
Status: COMPLETED | OUTPATIENT
Start: 2020-10-28 | End: 2020-10-28

## 2020-10-28 RX ADMIN — ANTACID TABLETS 200 MG: 500 TABLET, CHEWABLE ORAL at 12:17

## 2020-10-28 RX ADMIN — NITROGLYCERIN 0.5 INCH: 20 OINTMENT TOPICAL at 21:28

## 2020-10-28 RX ADMIN — LORAZEPAM 1 MG: 2 INJECTION INTRAMUSCULAR; INTRAVENOUS at 21:28

## 2020-10-28 RX ADMIN — ASPIRIN 81 MG: 81 TABLET, CHEWABLE ORAL at 12:17

## 2020-10-28 RX ADMIN — AMLODIPINE BESYLATE 5 MG: 5 TABLET ORAL at 12:17

## 2020-10-28 RX ADMIN — CITALOPRAM HYDROBROMIDE 20 MG: 20 TABLET ORAL at 12:17

## 2020-10-28 RX ADMIN — ATORVASTATIN CALCIUM 20 MG: 20 TABLET, FILM COATED ORAL at 23:14

## 2020-10-28 RX ADMIN — ENOXAPARIN SODIUM 70 MG: 80 INJECTION SUBCUTANEOUS at 21:28

## 2020-10-28 RX ADMIN — ANTACID TABLETS 200 MG: 500 TABLET, CHEWABLE ORAL at 17:28

## 2020-10-28 NOTE — PROGRESS NOTES
Hospitalist Progress Note    Patient: Mayra Garcia MRN: [id number]  CSN: [id number]    YOB: 1970  Age: 47 y.o. Sex: female    DOA: 10/27/2020 LOS:  LOS: 0 days          Chief Complaint:    Chest pain    Assessment/Plan     Carmelina Hong is a 47 y.o. female with past medical history of active tobacco abuse who presents to the emergency department complaining of chest pain.     Acute chest pain -  Cardiac enzymes did not titrate  Echocardiogram within normal limits  Cardiac stress test today, management deferred to cardiology     GERD -   Heartburn much improved  mylanta prn   Continue omeprazole 80mg every day      SEEMA -  Resume home citalopram      Hypertension -  Resume home Norvasc 5mg po qd     Tobacco abuse -  Advised patient on permanent cessation     DVT/GI prophylaxis -ordered    Disposition: Home  Patient Active Problem List   Diagnosis Code    Hypertension I10    Excessive or frequent menstruation N92.0    Dysmenorrhea N94.6    Chest pain R07.9    Bulging lumbar disc M51.26    Lumbar facet arthropathy M47.816    Neuritis M79.2    Myofascial pain M79.18    Cervical facet joint syndrome M47.812    Pain of left hip joint M25.552    Left foot drop M21.372    Primary osteoarthritis of left hip M16.12    GERD (gastroesophageal reflux disease) K21.9    SEEMA (generalized anxiety disorder) F41.1    Tobacco abuse Z72.0       Subjective:    Pt does much better, chest pain and heartburn have resolved    Just returned from stress test about to eat lunch. [place] says she feels anxious that she has never been hospitalized before and is ready to go home      Review of systems:    Constitutional: denies fevers, chills, myalgias  Respiratory: denies SOB, cough  Cardiovascular: denies chest pain, palpitations  Gastrointestinal: denies nausea, vomiting, diarrhea      Vital signs/Intake and Output:  Visit Vitals  /76 (BP 1 Location: Left arm, BP Patient Position:  At rest;Sitting)   Pulse 84   Temp 98 °F (36.7 °C)   Resp 20   Ht 5' 4\" (1.626 m)   Wt 66.7 kg (147 lb)   SpO2 99%   Breastfeeding No   BMI 25.23 kg/m²     Current Shift:  No intake/output data recorded. Last three shifts:  No intake/output data recorded. Exam:    General: Well developed, alert, NAD, OX3  Head/Neck: NCAT, supple, No masses, No lymphadenopathy  CVS:Regular rate and rhythm, no M/R/G, S1/S2 heard, no thrill  Lungs:Clear to auscultation bilaterally, no wheezes, rhonchi, or rales  Abdomen: Soft, Nontender, No distention, Normal Bowel sounds, No hepatomegaly  Extremities: No C/C/E, pulses palpable 2+  Skin:normal texture and turgor, no rashes, no lesions  Neuro:grossly normal , follows commands  Psych:appropriate                Labs: Results:       Chemistry Recent Labs     10/28/20  0325 10/27/20  0930   GLU 86 96    138   K 5.0 3.8    103   CO2 32 28   BUN 19* 15   CREA 0.93 0.93   CA 9.7 9.6   AGAP 5 7   BUCR 20 16   AP  --  68   TP  --  7.7   ALB  --  4.1   GLOB  --  3.6   AGRAT  --  1.1      CBC w/Diff Recent Labs     10/28/20  0325 10/27/20  0930   WBC 6.4 8.2   RBC 4.23 4.71   HGB 13.5 15.4   HCT 41.2 45.2*    242   GRANS  --  71   LYMPH  --  20*   EOS  --  1      Cardiac Enzymes Recent Labs     10/28/20  0325 10/27/20  1509   CPK 62 60   CKND1 1.9 CALCULATION NOT PERFORMED WHEN RESULT IS BELOW LINEAR LIMIT      Coagulation No results for input(s): PTP, INR, APTT, INREXT in the last 72 hours. Lipid Panel No results found for: CHOL, CHOLPOCT, CHOLX, CHLST, CHOLV, 065437, HDL, HDLP, LDL, LDLC, DLDLP, 899866, VLDLC, VLDL, TGLX, TRIGL, TRIGP, TGLPOCT, CHHD, CHHDX   BNP No results for input(s): BNPP in the last 72 hours.    Liver Enzymes Recent Labs     10/27/20  0930   TP 7.7   ALB 4.1   AP 68      Thyroid Studies No results found for: T4, T3U, TSH, TSHEXT     Procedures/imaging: see electronic medical records for all procedures/Xrays and details which were not copied into this note but were reviewed prior to creation of Ava Singh MD

## 2020-10-28 NOTE — PROGRESS NOTES
2330:  Assumed care for patient, received bedside report from [facility], RN. Patient quietly lying in bed resting, with no complaints of pain or discomfort at the time. Patient is to have stress test in AM, will be NPO. Whiteboard updated, bed at the lowest position with call bell within reach. Shift uneventful. Bedside and Verbal shift change report given to Sowmya Torres RN (oncoming nurse) by Davy Mac RN   (offgoing nurse). Report included the following information SBAR, Kardex, ED Summary, Procedure Summary, Intake/Output, MAR, Recent Results, Med Rec Status, Cardiac Rhythm SR and Alarm Parameters .

## 2020-10-28 NOTE — PROGRESS NOTES
Bedside and Verbal shift change report given to Fortunato [place] RN (oncoming nurse) by YANCY Hameed RN (offgoing nurse). Report included the following information SBAR, Kardex, Intake/Output, MAR and Recent Results.

## 2020-10-28 NOTE — CONSULTS
[facility] Consult Note      Patient: Oneyda Hernandez MRN: [id number]  SSN: [id number]    YOB: 1970  Age: 47 y.o. Sex: female    Date of Consultation: 10/27/2020  Referring Physician: Hero Michael MD  Reason for Consultation: Chest pain, shortness of breath    Chief complain: Chest pain    HPI:   76-year-old female came to emergency room with complaining of chest pain and shortness of breath. Two weeks ago she developed significant midsternal chest pain burning type, squeezing-type and lasted for some time. Pain was so intense she does not remember how long it lasted. She also developed dizziness and perspiration. Midsternal chest pain is subsided. Now she is complaining of left-sided chest pain. Left-sided chest pain is continuous. She cannot identify any aggravating or relieving factors for chest pain. She denies any cough. She is also complaining of shortness of breath on exertion. Shortness of breath   On exertion for last few months. Shortness of breath relieved by rest.  She denies any orthopnea PND. She denies any palpitation, presyncope or syncope. She is current smoker. She has family history of premature coronary artery disease. Her father had MI in his 45s. Patient mentioned that for last few weeks she is not feeling right.     Past Medical History:   Diagnosis Date    Anxiety     Arthritis     Asthma     pt states she does not have asthma    Chronic pain     Chronic Back Pain     Depression     Gastrointestinal disorder     GERD (gastroesophageal reflux disease)     well controlled w/ meds    Hypertension     no medications currently; off [id number]    IBS (irritable bowel syndrome)     Other ill-defined conditions(799.89)     bulging disc    Other ill-defined conditions(799.89)     sinusitis    Tattoo     TATTOO     Past Surgical History:   Procedure Laterality Date    ENDOSCOPY, COLON, DIAGNOSTIC      HX BREAST BIOPSY      left    HX CHOLECYSTECTOMY  2016    HX CYST INCISION AND DRAINAGE      left    HX GYN  2011    cysts removed from both ovaries    HX HYSTERECTOMY      partial     LAP,CHOLECYSTECTOMY  2-25-16    Dr. Keely Hilton     Current Facility-Administered Medications   Medication Dose Route Frequency    [START ON 10/28/2020] aspirin chewable tablet 81 mg  81 mg Oral DAILY    nitroglycerin (NITROSTAT) tablet 0.4 mg  0.4 mg SubLINGual Q5MIN PRN    oxyCODONE IR (ROXICODONE) tablet 5 mg  5 mg Oral Q4H PRN    morphine 10 mg/ml injection 4 mg  4 mg IntraVENous Q4H PRN    calcium carbonate (TUMS) chewable tablet 200 mg [elemental]  200 mg Oral TID WITH MEALS    LORazepam (ATIVAN) injection 1 mg  1 mg IntraVENous Q6H PRN    alum-mag hydroxide-simeth (MYLANTA) oral suspension 30 mL  30 mL Oral Q4H PRN       Allergies and Intolerances: Allergies   Allergen Reactions    Percocet [Oxycodone-Acetaminophen] Nausea and Vomiting    Advair Diskus [Fluticasone Propion-Salmeterol] Nausea and Vomiting    Ibuprofen Nausea and Vomiting    Tylenol [Acetaminophen] Other (comments)     Causes upset stomach       Family History:   Family History   Problem Relation Age of Onset    Breast Cancer Mother     Coronary Artery Disease Other     Breast Cancer Sister        Social History:   She  reports that she has been smoking. She has been smoking about 1.00 pack per day. She has never used smokeless tobacco.  She  reports no history of alcohol use. Review of Systems:     Gen: No fever, chills, malaise, weight loss/gain. Heent: No headache, rhinorrhea, epistaxis, ear pain, hearing loss, sinus pain, neck pain/stiffness, sore throat. Heart: Positive chest pain, shortness of breath, No palpitations, pnd, or orthopnea. Resp: No cough, hemoptysis, wheezing and dyspnea  GI: No nausea, vomiting, diarrhea, constipation, melena or hematochezia. : No urinary obstruction, dysuria or hematuria. Derm: No rash, new skin lesion or pruritis.    Musc/skeletal: no bone or joint complains. Vasc: No edema, cyanosis or claudication. Endo: No heat/cold intolerance, no polyuria,polydipsia or polyphagia. Neuro: No unilateral weakness, numbness, tingling. No seizures. Heme: No easy bruising or bleeding. Physical:   Patient Vitals for the past 6 hrs:   Temp Pulse Resp BP SpO2   10/27/20 1800  78 20 115/71 97 %   10/27/20 1700  77 18 115/73 99 %   10/27/20 1600 97.4 °F (36.3 °C) 99 23 122/64 99 %         Exam:   General Appearance: Comfortable, not using accessory muscles of respiration. HEENT: ROXANNA. HEAD: Atraumatic  NECK: No JVD, no thyroidomeglay. CAROTIDS: No bruit  LUNGS: Clear bilaterally. HEART: S1+S2 audible, no murmur, no pericardial rub. ABD: Non-tender, BS Audible    EXT: No edema, and no cysnosis. VASCULAR EXAM: Pulses are intact. PSYCHIATRIC EXAM: Mood is appropriate. MUSCULOSKELETAL: Grossly no joint deformity.   NEUROLOGICAL: AAO times 3, Motor and sensory sytem intact     Review of Data:   LABS:   Lab Results   Component Value Date/Time    WBC 8.2 10/27/2020 09:30 AM    HGB 15.4 10/27/2020 09:30 AM    HCT 45.2 (H) 10/27/2020 09:30 AM    PLATELET 226 01/49/4960 09:30 AM     Lab Results   Component Value Date/Time    Sodium 138 10/27/2020 09:30 AM    Potassium 3.8 10/27/2020 09:30 AM    Chloride 103 10/27/2020 09:30 AM    CO2 28 10/27/2020 09:30 AM    Glucose 96 10/27/2020 09:30 AM    BUN 15 10/27/2020 09:30 AM    Creatinine 0.93 10/27/2020 09:30 AM     No results found for: CHOL, CHOLX, CHLST, CHOLV, HDL, HDLP, LDL, LDLC, DLDLP, TGLX, TRIGL, TRIGP  No components found for: GPT  No results found for: HBA1C, HGBE8, NQH7RSLE, FFO5GJTX      Cardiology Procedures:   Results for orders placed or performed during the hospital encounter of 10/27/20   EKG, 12 LEAD, INITIAL   Result Value Ref Range    Ventricular Rate 90 BPM    Atrial Rate 90 BPM    P-R Interval 132 ms    QRS Duration 82 ms    Q-T Interval 374 ms    QTC Calculation (Bezet) 457 ms    Calculated P Axis 61 degrees    Calculated R Axis 68 degrees    Calculated T Axis 60 degrees    Diagnosis       Normal sinus rhythm  Normal ECG  Confirmed by Sienna Mejia MD, Adia Brown ([id number]) on 10/27/2020 9:23:33 PM             Impression / Plan:    Patient Active Problem List   Diagnosis Code    Hypertension I10    Excessive or frequent menstruation N92.0    Dysmenorrhea N94.6    Chest pain R07.9    Bulging lumbar disc M51.26    Lumbar facet arthropathy M47.816    Neuritis M79.2    Myofascial pain M79.18    Cervical facet joint syndrome M47.812    Pain of left hip joint M25.552    Left foot drop M21.372    Primary osteoarthritis of left hip M16.12     Chest pain  Shortness of breath on exertion  Cigarette smoker  Family history of premature coronary artery disease. 79-year-old female with significant family history of premature coronary artery disease came with midsternal and now left-sided chest pain. She is also complaining of shortness of breath on exertion. She is current smoker. Serial EKG with cardiac enzymes Q 6-8 hours x3     Aspirin 81 mg   By mouth. once a day. I advised the ER physician  To give full dose of Lovenox. echocardiogram     If echocardiogram  Looks okay and serial cardiac enzymes are negative will schedule for exercise nuclear stress test.    Plan discussed with patient and she verbally understood.        Signed By: Jackqulyn Bernheim, MD     October 27, 2020

## 2020-10-28 NOTE — PROGRESS NOTES
Oral and Written notification given to patient and/or caregiver informing them that they are currently an Outpatient receiving care in our facility. Outpatient services include Observation Services under [address] and [facility] requirements.

## 2020-10-28 NOTE — PROGRESS NOTES
Chart reviewed. Pt admitted to [facility] in obs status by hospitalist for chest pain. CM will follow for transition of care needs [phone]  Attended IDRs, pt off unit for stress test    Reason for Admission:   Per H&P pt is \"is a 47 y.o. female with past medical history of active tobacco use who presents to the emergency department C/O chest pain.  Per patient the chest pain started 10 days ago and is sharp and constant.  She states is located on the left side of her chest and is constant without clear relieving or exacerbating factors but radiates to her left shoulder and down left arm. She states that it has been getting worse over the 10 days. Disputanta Princess states is associated with shortness of breath that has also been getting worse over the 10 days. Aayush Princess denies any fever, calf swelling, recent travel, sick contacts, bowel or urinary complaints. Aayush Sánchez has been seen this week by her primary care doctor and cardiology who had scheduled an echo and stress test but these have not yet occurred.  She does have significant family history of heart disease in her father and paternal grandfather. Father had a MI at 39 and 61 as well as a CVA and grandfather had an MI in his 76s.  \"                   RUR Score:                     Plan for utilizing home health:      Not anticipated    PCP: First and Last name:  Listed as MD Kimberly Steven   Name of Practice: [facility].   Are you a current patient: Yes/No:    Approximate date of last visit:    Can you participate in a virtual visit with your PCP:                     Current Advanced Directive/Advance Care Plan: Not on file                         Transition of Care Plan:   Home

## 2020-10-28 NOTE — PROGRESS NOTES
Problem: Falls - Risk of  Goal: *Absence of Falls  Description: Document Raya Al Fall Risk and appropriate interventions in the flowsheet.   Outcome: Progressing Towards Goal  Note: Fall Risk Interventions:            Medication Interventions: Bed/chair exit alarm, Evaluate medications/consider consulting pharmacy, Patient to call before getting OOB         History of Falls Interventions: Bed/chair exit alarm, Consult care management for discharge planning, Evaluate medications/consider consulting pharmacy, Door open when patient unattended, Investigate reason for fall, Room close to nurse's station, Utilize gait belt for transfer/ambulation, Assess for delayed presentation/identification of injury for 48 hrs (comment for end date)         Problem: Patient Education: Go to Patient Education Activity  Goal: Patient/Family Education  Outcome: Progressing Towards Goal

## 2020-10-29 VITALS
BODY MASS INDEX: 25.1 KG/M2 | OXYGEN SATURATION: 98 % | TEMPERATURE: 98.6 F | HEART RATE: 98 BPM | RESPIRATION RATE: 17 BRPM | HEIGHT: 64 IN | DIASTOLIC BLOOD PRESSURE: 80 MMHG | SYSTOLIC BLOOD PRESSURE: 132 MMHG | WEIGHT: 147 LBS

## 2020-10-29 LAB
ACT BLD: 180 SECS (ref 79–138)
ALBUMIN SERPL-MCNC: 3.5 G/DL (ref 3.4–5)
ALBUMIN/GLOB SERPL: 1.2 {RATIO} (ref 0.8–1.7)
ALP SERPL-CCNC: 71 U/L (ref 45–117)
ALT SERPL-CCNC: 83 U/L (ref 13–56)
ANION GAP SERPL CALC-SCNC: 4 MMOL/L (ref 3–18)
AST SERPL-CCNC: 36 U/L (ref 10–38)
BILIRUB SERPL-MCNC: 0.4 MG/DL (ref 0.2–1)
BUN SERPL-MCNC: 16 MG/DL (ref 7–18)
BUN/CREAT SERPL: 17 (ref 12–20)
CALCIUM SERPL-MCNC: 8.9 MG/DL (ref 8.5–10.1)
CHLORIDE SERPL-SCNC: 105 MMOL/L (ref 100–111)
CHOLEST SERPL-MCNC: 195 MG/DL
CO2 SERPL-SCNC: 32 MMOL/L (ref 21–32)
CREAT SERPL-MCNC: 0.94 MG/DL (ref 0.6–1.3)
ERYTHROCYTE [DISTWIDTH] IN BLOOD BY AUTOMATED COUNT: 12.7 % (ref 11.6–14.5)
GLOBULIN SER CALC-MCNC: 2.9 G/DL (ref 2–4)
GLUCOSE SERPL-MCNC: 89 MG/DL (ref 74–99)
HCG SERPL QL: NEGATIVE
HCT VFR BLD AUTO: 41.4 % (ref 35–45)
HDLC SERPL-MCNC: 71 MG/DL (ref 40–60)
HDLC SERPL: 2.7 {RATIO} (ref 0–5)
HGB BLD-MCNC: 13.9 G/DL (ref 12–16)
INR PPP: 1 (ref 0.8–1.2)
LDLC SERPL CALC-MCNC: 106.8 MG/DL (ref 0–100)
LIPID PROFILE,FLP: ABNORMAL
MCH RBC QN AUTO: 32.3 PG (ref 24–34)
MCHC RBC AUTO-ENTMCNC: 33.6 G/DL (ref 31–37)
MCV RBC AUTO: 96.3 FL (ref 74–97)
PLATELET # BLD AUTO: 216 K/UL (ref 135–420)
PMV BLD AUTO: 9.1 FL (ref 9.2–11.8)
POTASSIUM SERPL-SCNC: 4.6 MMOL/L (ref 3.5–5.5)
PROT SERPL-MCNC: 6.4 G/DL (ref 6.4–8.2)
PROTHROMBIN TIME: 13 SEC (ref 11.5–15.2)
RBC # BLD AUTO: 4.3 M/UL (ref 4.2–5.3)
SODIUM SERPL-SCNC: 141 MMOL/L (ref 136–145)
TRIGL SERPL-MCNC: 86 MG/DL (ref ?–150)
VLDLC SERPL CALC-MCNC: 17.2 MG/DL
WBC # BLD AUTO: 6.6 K/UL (ref 4.6–13.2)

## 2020-10-29 PROCEDURE — 99218 HC RM OBSERVATION: CPT

## 2020-10-29 PROCEDURE — 96376 TX/PRO/DX INJ SAME DRUG ADON: CPT

## 2020-10-29 PROCEDURE — 85027 COMPLETE CBC AUTOMATED: CPT

## 2020-10-29 PROCEDURE — 93458 L HRT ARTERY/VENTRICLE ANGIO: CPT | Performed by: INTERNAL MEDICINE

## 2020-10-29 PROCEDURE — 77030012468 HC VLV BLEEDBK CNTRL ABBT -B: Performed by: INTERNAL MEDICINE

## 2020-10-29 PROCEDURE — 93571 IV DOP VEL&/PRESS C FLO 1ST: CPT | Performed by: INTERNAL MEDICINE

## 2020-10-29 PROCEDURE — 77030016699 HC CATH ANGI DX INFN1 CARD -A: Performed by: INTERNAL MEDICINE

## 2020-10-29 PROCEDURE — 36415 COLL VENOUS BLD VENIPUNCTURE: CPT

## 2020-10-29 PROCEDURE — B2111ZZ FLUOROSCOPY OF MULTIPLE CORONARY ARTERIES USING LOW OSMOLAR CONTRAST: ICD-10-PCS | Performed by: INTERNAL MEDICINE

## 2020-10-29 PROCEDURE — C1769 GUIDE WIRE: HCPCS | Performed by: INTERNAL MEDICINE

## 2020-10-29 PROCEDURE — 74011250636 HC RX REV CODE- 250/636: Performed by: FAMILY MEDICINE

## 2020-10-29 PROCEDURE — 65660000000 HC RM CCU STEPDOWN

## 2020-10-29 PROCEDURE — 74011000636 HC RX REV CODE- 636: Performed by: INTERNAL MEDICINE

## 2020-10-29 PROCEDURE — 85347 COAGULATION TIME ACTIVATED: CPT

## 2020-10-29 PROCEDURE — 77030029997 HC DEV COM RDL R BND TELE -B: Performed by: INTERNAL MEDICINE

## 2020-10-29 PROCEDURE — 84703 CHORIONIC GONADOTROPIN ASSAY: CPT

## 2020-10-29 PROCEDURE — 74011000250 HC RX REV CODE- 250: Performed by: INTERNAL MEDICINE

## 2020-10-29 PROCEDURE — 74011250636 HC RX REV CODE- 250/636: Performed by: INTERNAL MEDICINE

## 2020-10-29 PROCEDURE — C1894 INTRO/SHEATH, NON-LASER: HCPCS | Performed by: INTERNAL MEDICINE

## 2020-10-29 PROCEDURE — 85610 PROTHROMBIN TIME: CPT

## 2020-10-29 PROCEDURE — 74011250637 HC RX REV CODE- 250/637: Performed by: FAMILY MEDICINE

## 2020-10-29 PROCEDURE — 4A033BC MEASUREMENT OF ARTERIAL PRESSURE, CORONARY, PERCUTANEOUS APPROACH: ICD-10-PCS | Performed by: INTERNAL MEDICINE

## 2020-10-29 PROCEDURE — 80053 COMPREHEN METABOLIC PANEL: CPT

## 2020-10-29 PROCEDURE — 77030004514: Performed by: INTERNAL MEDICINE

## 2020-10-29 PROCEDURE — 4A023N7 MEASUREMENT OF CARDIAC SAMPLING AND PRESSURE, LEFT HEART, PERCUTANEOUS APPROACH: ICD-10-PCS | Performed by: INTERNAL MEDICINE

## 2020-10-29 PROCEDURE — 77030013797 HC KT TRNSDUC PRSSR EDWD -A: Performed by: INTERNAL MEDICINE

## 2020-10-29 PROCEDURE — 77030004522 HC CATH ANGI DX EXPO BSC -A: Performed by: INTERNAL MEDICINE

## 2020-10-29 PROCEDURE — C1887 CATHETER, GUIDING: HCPCS | Performed by: INTERNAL MEDICINE

## 2020-10-29 PROCEDURE — 99153 MOD SED SAME PHYS/QHP EA: CPT | Performed by: INTERNAL MEDICINE

## 2020-10-29 PROCEDURE — 99152 MOD SED SAME PHYS/QHP 5/>YRS: CPT | Performed by: INTERNAL MEDICINE

## 2020-10-29 PROCEDURE — 77030008543 HC TBNG MON PRSS MRTM -A: Performed by: INTERNAL MEDICINE

## 2020-10-29 RX ORDER — SODIUM CHLORIDE 9 MG/ML
100 INJECTION, SOLUTION INTRAVENOUS CONTINUOUS
Status: DISCONTINUED | OUTPATIENT
Start: 2020-10-29 | End: 2020-10-29 | Stop reason: HOSPADM

## 2020-10-29 RX ORDER — FENTANYL CITRATE 50 UG/ML
INJECTION, SOLUTION INTRAMUSCULAR; INTRAVENOUS AS NEEDED
Status: DISCONTINUED | OUTPATIENT
Start: 2020-10-29 | End: 2020-10-29 | Stop reason: HOSPADM

## 2020-10-29 RX ORDER — VERAPAMIL HYDROCHLORIDE 2.5 MG/ML
INJECTION, SOLUTION INTRAVENOUS AS NEEDED
Status: DISCONTINUED | OUTPATIENT
Start: 2020-10-29 | End: 2020-10-29 | Stop reason: HOSPADM

## 2020-10-29 RX ORDER — SODIUM CHLORIDE 0.9 % (FLUSH) 0.9 %
5-40 SYRINGE (ML) INJECTION EVERY 8 HOURS
Status: DISCONTINUED | OUTPATIENT
Start: 2020-10-29 | End: 2020-10-29 | Stop reason: HOSPADM

## 2020-10-29 RX ORDER — HEPARIN SODIUM 200 [USP'U]/100ML
INJECTION, SOLUTION INTRAVENOUS
Status: COMPLETED | OUTPATIENT
Start: 2020-10-29 | End: 2020-10-29

## 2020-10-29 RX ORDER — HEPARIN SODIUM 1000 [USP'U]/ML
INJECTION, SOLUTION INTRAVENOUS; SUBCUTANEOUS AS NEEDED
Status: DISCONTINUED | OUTPATIENT
Start: 2020-10-29 | End: 2020-10-29 | Stop reason: HOSPADM

## 2020-10-29 RX ORDER — LIDOCAINE HYDROCHLORIDE 10 MG/ML
INJECTION INFILTRATION; PERINEURAL AS NEEDED
Status: DISCONTINUED | OUTPATIENT
Start: 2020-10-29 | End: 2020-10-29 | Stop reason: HOSPADM

## 2020-10-29 RX ORDER — MIDAZOLAM HYDROCHLORIDE 1 MG/ML
INJECTION, SOLUTION INTRAMUSCULAR; INTRAVENOUS AS NEEDED
Status: DISCONTINUED | OUTPATIENT
Start: 2020-10-29 | End: 2020-10-29 | Stop reason: HOSPADM

## 2020-10-29 RX ORDER — SODIUM CHLORIDE 0.9 % (FLUSH) 0.9 %
5-40 SYRINGE (ML) INJECTION AS NEEDED
Status: DISCONTINUED | OUTPATIENT
Start: 2020-10-29 | End: 2020-10-29 | Stop reason: HOSPADM

## 2020-10-29 RX ADMIN — LORAZEPAM 1 MG: 2 INJECTION INTRAMUSCULAR; INTRAVENOUS at 13:52

## 2020-10-29 RX ADMIN — CITALOPRAM HYDROBROMIDE 20 MG: 20 TABLET ORAL at 09:20

## 2020-10-29 RX ADMIN — AMLODIPINE BESYLATE 5 MG: 5 TABLET ORAL at 09:21

## 2020-10-29 RX ADMIN — ASPIRIN 81 MG: 81 TABLET, CHEWABLE ORAL at 17:51

## 2020-10-29 RX ADMIN — ANTACID TABLETS 200 MG: 500 TABLET, CHEWABLE ORAL at 09:23

## 2020-10-29 RX ADMIN — SODIUM CHLORIDE 100 ML/HR: 900 INJECTION, SOLUTION INTRAVENOUS at 17:47

## 2020-10-29 NOTE — DISCHARGE INSTRUCTIONS
Patient Education        Learning About ACE Inhibitors  What are ACE inhibitors? ACE (angiotensin-converting enzyme) inhibitors are medicines that lower blood pressure. They stop the release of an enzyme. This enzyme makes your blood vessels smaller. Without it, your blood vessels relax and get bigger. This lowers your blood pressure. These medicines also increase how much water and salt go into your urine. This also lowers blood pressure. You may take this kind of medicine if you have high blood pressure. Or you may take it if you have heart problems, kidney problems, or diabetes. If you have coronary artery disease, this medicine can help prevent heart attacks and strokes. Examples  · benazepril (Lotensin)  · enalapril (Vasotec)  · lisinopril (Prinivil, Zestril)  · ramipril (Altace)  This is not a complete list.  Possible side effects  Side effects may include:  · A cough. · Low blood pressure. This can make you feel dizzy or weak. · Too much potassium in your body. · Swelling of your lips, tongue, or face. If the swelling is severe, you may need treatment right away. Severe swelling can make it hard to breathe, but this is rare. You may have other side effects or reactions not listed here. Check the information that comes with your medicine. What to know about taking this medicine  · ACE inhibitors can cause a dry cough. Talk to your doctor if you have a dry cough. You may need a different medicine. · These medicines can cause an allergic reaction. This can cause a little swelling. Or it can cause red bumps on your skin that hurt. In rare cases, the swelling may make it hard for you to breathe. · Do not take this medicine if you are pregnant or plan to become pregnant. · Take your medicines exactly as prescribed. Call your doctor if you think you are having a problem with your medicine. · Check with your doctor or pharmacist before you use any other medicines.  This includes over-the-counter medicines. Make sure your doctor knows all of the medicines, vitamins, herbal products, and supplements you take. Taking some medicines together can cause problems. · You may need regular blood tests. Where can you learn more? Go to http://www.gray.com/  Enter P050 in the search box to learn more about \"Learning About ACE Inhibitors. \"  Current as of: December 16, 2019               Content Version: 12.6  © 3276-6657 [employer]. Care instructions adapted under license by [employer] (which disclaims liability or warranty for this information). If you have questions about a medical condition or this instruction, always ask your healthcare professional. [place] [address] any warranty or liability for your use of this information. Patient Education        Learning About ARBs  Introduction     ARBs (angiotensin II receptor blockers) block a hormone that makes blood vessels narrow. As a result, the blood vessels relax and widen. This lowers blood pressure. ARBs also put more water and salt into the urine. This also lowers blood pressure. ARBs can treat:  · High blood pressure. · Coronary artery disease. · Heart failure. They also may be used to help your kidneys when you have diabetes. Examples  · candesartan (Atacand)  · irbesartan (Avapro)  · losartan (Cozaar)  · olmesartan (Benicar)  · valsartan (Diovan)  This is not a complete list of all ARBs. Possible side effects  Side effects may include:  · Low blood pressure. You may feel dizzy and weak. · High potassium levels. You may have other side effects or reactions not listed here. Check the information that comes with your medicine. What to know about taking this medicine  · ARBs may be used if you had a cough when you tried to take an ACE inhibitor. ARBs are less likely to cause a cough. · You may need regular blood tests. · Take your medicines exactly as prescribed.  Call your doctor if you think you are having a problem with your medicine. · Tell your doctor or pharmacist all the medicines you take. This includes over-the-counter medicines, vitamins, herbal products, and supplements. Taking some medicines together can cause problems. · You should not take ARBs if you are pregnant or planning to become pregnant. Where can you learn more? Go to http://www.gray.com/  Enter K212 in the search box to learn more about \"Learning About ARBs. \"  Current as of: December 16, 2019               Content Version: 12.6  © 6751-2117 [employer]. Care instructions adapted under license by [employer] (which disclaims liability or warranty for this information). If you have questions about a medical condition or this instruction, always ask your healthcare professional. [address] any warranty or liability for your use of this information. Patient Education        Chest Pain: Care Instructions  Your Care Instructions     There are many things that can cause chest pain. Some are not serious and will get better on their own in a few days. But some kinds of chest pain need more testing and treatment. Your doctor may have recommended a follow-up visit in the next 8 to 12 hours. If you are not getting better, you may need more tests or treatment. Even though your doctor has released you, you still need to watch for any problems. The doctor carefully checked you, but sometimes problems can develop later. If you have new symptoms or if your symptoms do not get better, get medical care right away. If you have worse or different chest pain or pressure that lasts more than 5 minutes or you passed out (lost consciousness), call 911 or seek other emergency help right away. A medical visit is only one step in your treatment.  Even if you feel better, you still need to do what your doctor recommends, such as going to all suggested follow-up appointments and taking medicines exactly as directed. This will help you recover and help prevent future problems. How can you care for yourself at home? · Rest until you feel better. · Take your medicine exactly as prescribed. Call your doctor if you think you are having a problem with your medicine. · Do not drive after taking a prescription pain medicine. When should you call for help? Call 911 if:     · You passed out (lost consciousness).     · You have severe difficulty breathing.     · You have symptoms of a heart attack. These may include:  ? Chest pain or pressure, or a strange feeling in your chest.  ? Sweating. ? Shortness of breath. ? Nausea or vomiting. ? Pain, pressure, or a strange feeling in your back, neck, jaw, or upper belly or in one or both shoulders or arms. ? Lightheadedness or sudden weakness. ? A fast or irregular heartbeat. After you call 911, the  may tell you to chew 1 adult-strength or 2 to 4 low-dose aspirin. Wait for an ambulance. Do not try to drive yourself. Call your doctor today if:     · You have any trouble breathing.     · Your chest pain gets worse.     · You are dizzy or lightheaded, or you feel like you may faint.     · You are not getting better as expected.     · You are having new or different chest pain. Where can you learn more? Go to http://www.eisenberg.com/  Enter A120 in the search box to learn more about \"Chest Pain: Care Instructions. \"  Current as of: June 26, 2019               Content Version: 12.6  © 3064-5404 [employer]. Care instructions adapted under license by [employer] (which disclaims liability or warranty for this information). If you have questions about a medical condition or this instruction, always ask your healthcare professional. [place] [address] any warranty or liability for your use of this information.          Patient Education        Anxiety Disorder: Care Instructions  Your Care Instructions     Anxiety is a normal reaction to stress. Difficult situations can cause you to have symptoms such as sweaty palms and a nervous feeling. In an anxiety disorder, the symptoms are far more severe. Constant worry, muscle tension, trouble sleeping, nausea and diarrhea, and other symptoms can make normal daily activities difficult or impossible. These symptoms may occur for no reason, and they can affect your work, school, or social life. Medicines, counseling, and self-care can all help. Follow-up care is a key part of your treatment and safety. Be sure to make and go to all appointments, and call your doctor if you are having problems. It's also a good idea to know your test results and keep a list of the medicines you take. How can you care for yourself at home? · Take medicines exactly as directed. Call your doctor if you think you are having a problem with your medicine. · Go to your counseling sessions and follow-up appointments. · Recognize and accept your anxiety. Then, when you are in a situation that makes you anxious, say to yourself, \"This is not an emergency. I feel uncomfortable, but I am not in danger. I can keep going even if I feel anxious. \"  · Be kind to your body:  ? Relieve tension with exercise or a massage. ? Get enough rest.  ? Avoid alcohol, caffeine, nicotine, and illegal drugs. They can increase your anxiety level and cause sleep problems. ? Learn and do relaxation techniques. See below for more about these techniques. · Engage your mind. Get out and do something you enjoy. Go to a funny movie, or take a walk or hike. Plan your day. Having too much or too little to do can make you anxious. · Keep a record of your symptoms. Discuss your fears with a good friend or family member, or join a support group for people with similar problems. Talking to others sometimes relieves stress.   · Get involved in social groups, or volunteer to help others. Being alone sometimes makes things seem worse than they are. · Get at least 30 minutes of exercise on most days of the week to relieve stress. Walking is a good choice. You also may want to do other activities, such as running, swimming, cycling, or playing tennis or team sports. Relaxation techniques  Do relaxation exercises 10 to 20 minutes a day. You can play soothing, relaxing music while you do them, if you wish. · Tell others in your house that you are going to do your relaxation exercises. Ask them not to disturb you. · Find a comfortable place, away from all distractions and noise. · Lie down on your back, or sit with your back straight. · Focus on your breathing. Make it slow and steady. · Breathe in through your nose. Breathe out through either your nose or mouth. · Breathe deeply, filling up the area between your navel and your rib cage. Breathe so that your belly goes up and down. · Do not hold your breath. · Breathe like this for 5 to 10 minutes. Notice the feeling of calmness throughout your whole body. As you continue to breathe slowly and deeply, relax by doing the following for another 5 to 10 minutes:  · Tighten and relax each muscle group in your body. You can begin at your toes and work your way up to your head. · Imagine your muscle groups relaxing and becoming heavy. · Empty your mind of all thoughts. · Let yourself relax more and more deeply. · Become aware of the state of calmness that surrounds you. · When your relaxation time is over, you can bring yourself back to alertness by moving your fingers and toes and then your hands and feet and then stretching and moving your entire body. Sometimes people fall asleep during relaxation, but they usually wake up shortly afterward. · Always give yourself time to return to full alertness before you drive a car or do anything that might cause an accident if you are not fully alert.  Never play a relaxation tape while you drive a car. When should you call for help? Call 911 anytime you think you may need emergency care. For example, call if:    · You feel you cannot stop from hurting yourself or someone else. Keep the numbers for these national suicide hotlines: 1-885-296-TALK (2-608.127.2976) and 4-041-HLSDVDJ (8-267.475.3656). If you or someone you know talks about suicide or feeling hopeless, get help right away. Watch closely for changes in your health, and be sure to contact your doctor if:    · You have anxiety or fear that affects your life.     · You have symptoms of anxiety that are new or different from those you had before. Where can you learn more? Go to http://www.eisenberg.com/  Enter P754 in the search box to learn more about \"Anxiety Disorder: Care Instructions. \"  Current as of: January 31, 2020               Content Version: 12.6  © 2006-2020 [employer]. Care instructions adapted under license by [employer] (which disclaims liability or warranty for this information). If you have questions about a medical condition or this instruction, always ask your healthcare professional. [place] [address] any warranty or liability for your use of this information. Patient Education        Musculoskeletal Chest Pain: Care Instructions  Your Care Instructions     Chest pain is not always a sign that something is wrong with your heart or that you have another serious problem. The doctor thinks your chest pain is caused by strained muscles or ligaments, inflamed chest cartilage, or another problem in your chest, rather than by your heart. You may need more tests to find the cause of your chest pain. Follow-up care is a key part of your treatment and safety. Be sure to make and go to all appointments, and call your doctor if you are having problems. It's also a good idea to know your test results and keep a list of the medicines you take.   How can you care for yourself at home? · Take pain medicines exactly as directed. ? If the doctor gave you a prescription medicine for pain, take it as prescribed. ? If you are not taking a prescription pain medicine, ask your doctor if you can take an over-the-counter medicine. · Rest and protect the sore area. · Stop, change, or take a break from any activity that may be causing your pain or soreness. · Put ice or a cold pack on the sore area for 10 to 20 minutes at a time. Try to do this every 1 to 2 hours for the next 3 days (when you are awake) or until the swelling goes down. Put a thin cloth between the ice and your skin. · After 2 or 3 days, apply a heating pad set on low or a warm cloth to the area that hurts. Some doctors suggest that you go back and forth between hot and cold. · Do not wrap or tape your ribs for support. This may cause you to take smaller breaths, which could increase your risk of lung problems. · Mentholated creams such as Bengay or Icy Hot may soothe sore muscles. Follow the instructions on the package. · Follow your doctor's instructions for exercising. · Gentle stretching and massage may help you get better faster. Stretch slowly to the point just before pain begins, and hold the stretch for at least 15 to 30 seconds. Do this 3 or 4 times a day. Stretch just after you have applied heat. · As your pain gets better, slowly return to your normal activities. Any increased pain may be a sign that you need to rest a while longer. When should you call for help? Call 911 anytime you think you may need emergency care. For example, call if:    · You have chest pain or pressure. This may occur with:  ? Sweating. ? Shortness of breath. ? Nausea or vomiting. ? Pain that spreads from the chest to the neck, jaw, or one or both shoulders or arms. ? Dizziness or lightheadedness. ? A fast or uneven pulse. After calling 911, chew 1 adult-strength aspirin. Wait for an ambulance.  Do not try to drive yourself.     · You have sudden chest pain and shortness of breath, or you cough up blood. Call your doctor now or seek immediate medical care if:    · You have any trouble breathing.     · Your chest pain gets worse.     · Your chest pain occurs consistently with exercise and is relieved by rest.   Watch closely for changes in your health, and be sure to contact your doctor if:    · Your chest pain does not get better after 1 week. Where can you learn more? Go to http://www.eisenberg.com/  Enter V293 in the search box to learn more about \"Musculoskeletal Chest Pain: Care Instructions. \"  Current as of: June 26, 2019               Content Version: 12.6  © 3531-9554 [employer]. Care instructions adapted under license by [employer] (which disclaims liability or warranty for this information). If you have questions about a medical condition or this instruction, always ask your healthcare professional. [place] [address] any warranty or liability for your use of this information. Patient Education        Learning About High Blood Pressure  What is high blood pressure? Blood pressure is a measure of how hard the blood pushes against the walls of your arteries. It's normal for blood pressure to go up and down throughout the day. But if it stays up, you have high blood pressure. Another name for high blood pressure is hypertension. Two numbers tell you your blood pressure. The first number is the systolic pressure (top number). It shows how hard the blood pushes when your heart is pumping. The second number is the diastolic pressure (bottom number). It shows how hard the blood pushes between heartbeats, when your heart is relaxed and filling with blood. Your doctor will give you a goal for your blood pressure based on your health and your age.  High blood pressure (hypertension) means that the top number stays high, or the bottom number stays high, or both. High blood pressure increases the risk of stroke, heart attack, and other problems. What happens when you have high blood pressure? · Blood flows through your arteries with too much force. Over time, this can damage the heart and the walls of your arteries. But you can't feel it. High blood pressure usually doesn't cause symptoms. · High blood pressure makes your heart work harder. And that can lead to heart failure, which means your heart doesn't pump as much blood as your body needs. · Fat and calcium start to build up in your arteries. This buildup is called hardening of the arteries. It can cause many problems including a heart attack and stroke. · Arteries also carry blood and oxygen to organs like your eyes, kidneys, and brain. If high blood pressure damages those arteries, it can lead to vision loss, kidney disease, stroke, and a higher risk of dementia. How can you prevent high blood pressure? · Stay at a healthy weight. · Try to limit how much sodium you eat to less than 2,300 milligrams (mg) a day. If you limit your sodium to 1,500 mg a day, you can lower your blood pressure even more. ? Buy foods that are labeled \"unsalted,\" \"sodium-free,\" or \"low-sodium. \" Foods labeled \"reduced-sodium\" and \"light sodium\" may still have too much sodium. ? Flavor your food with garlic, lemon juice, onion, vinegar, herbs, and spices instead of salt. Do not use soy sauce, steak sauce, onion salt, garlic salt, mustard, or ketchup on your food. ? Use less salt (or none) when recipes call for it. You can often use half the salt a recipe calls for without losing flavor. · Be physically active. Get at least 30 minutes of exercise on most days of the week. Walking is a good choice. You also may want to do other activities, such as running, swimming, cycling, or playing tennis or team sports. · Limit alcohol to 2 drinks a day for men and 1 drink a day for women.   · Eat plenty of fruits, vegetables, and low-fat dairy products. Eat less saturated and total fats. How is high blood pressure treated? · Your doctor will suggest making lifestyle changes to help your heart. For example, your doctor may ask you to eat healthy foods, quit smoking, lose extra weight, and be more active. · If lifestyle changes don't help enough, your doctor may recommend that you take medicine. · When blood pressure is very high, medicines are needed to lower it. Follow-up care is a key part of your treatment and safety. Be sure to make and go to all appointments, and call your doctor if you are having problems. It's also a good idea to know your test results and keep a list of the medicines you take. Where can you learn more? Go to http://www.eisenberg.com/  Enter P501 in the search box to learn more about \"Learning About High Blood Pressure. \"  Current as of: December 16, 2019               Content Version: 12.6  © 7295-8188 [employer]. Care instructions adapted under license by [employer] (which disclaims liability or warranty for this information). If you have questions about a medical condition or this instruction, always ask your healthcare professional. [place] [address] any warranty or liability for your use of this information. Patient Education        Low Sodium Diet (2,000 Milligram): Care Instructions  Your Care Instructions     Too much sodium causes your body to hold on to extra water. This can raise your blood pressure and force your heart and kidneys to work harder. In very serious cases, this could cause you to be put in the hospital. It might even be life-threatening. By limiting sodium, you will feel better and lower your risk of serious problems. The most common source of sodium is salt. People get most of the salt in their diet from canned, prepared, and packaged foods.  Fast food and restaurant meals also are very high in sodium. Your doctor will probably limit your sodium to less than 2,000 milligrams (mg) a day. This limit counts all the sodium in prepared and packaged foods and any salt you add to your food. Follow-up care is a key part of your treatment and safety. Be sure to make and go to all appointments, and call your doctor if you are having problems. It's also a good idea to know your test results and keep a list of the medicines you take. How can you care for yourself at home? Read food labels  · Read labels on cans and food packages. The labels tell you how much sodium is in each serving. Make sure that you look at the serving size. If you eat more than the serving size, you have eaten more sodium. · Food labels also tell you the Percent Daily Value for sodium. Choose products with low Percent Daily Values for sodium. · Be aware that sodium can come in forms other than salt, including monosodium glutamate (MSG), sodium citrate, and sodium bicarbonate (baking soda). MSG is often added to Asian food. When you eat out, you can sometimes ask for food without MSG or added salt. Buy low-sodium foods  · Buy foods that are labeled \"unsalted\" (no salt added), \"sodium-free\" (less than 5 mg of sodium per serving), or \"low-sodium\" (less than 140 mg of sodium per serving). Foods labeled \"reduced-sodium\" and \"light sodium\" may still have too much sodium. Be sure to read the label to see how much sodium you are getting. · Buy fresh vegetables, or frozen vegetables without added sauces. Buy low-sodium versions of canned vegetables, soups, and other canned goods. Prepare low-sodium meals  · Cut back on the amount of salt you use in cooking. This will help you adjust to the taste. Do not add salt after cooking. One teaspoon of salt has about 2,300 mg of sodium. · Take the salt shaker off the table. · Flavor your food with garlic, lemon juice, onion, vinegar, herbs, and spices.  Do not use soy sauce, lite soy sauce, steak sauce, onion salt, garlic salt, celery salt, mustard, or ketchup on your food. · Use low-sodium salad dressings, sauces, and ketchup. Or make your own salad dressings and sauces without adding salt. · Use less salt (or none) when recipes call for it. You can often use half the salt a recipe calls for without losing flavor. Other foods such as rice, pasta, and grains do not need added salt. · Rinse canned vegetables, and cook them in fresh water. This removes some--but not all--of the salt. · Avoid water that is naturally high in sodium or that has been treated with water softeners, which add sodium. Call your local water company to find out the sodium content of your water supply. If you buy bottled water, read the label and choose a sodium-free brand. Avoid high-sodium foods  · Avoid eating:  ? Smoked, cured, salted, and canned meat, fish, and poultry. ? Ham, amaya, hot dogs, and luncheon meats. ? Regular, hard, and processed cheese and regular peanut butter. ? Crackers with salted tops, and other salted snack foods such as pretzels, chips, and salted popcorn. ? Frozen prepared meals, unless labeled low-sodium. ? Canned and dried soups, broths, and bouillon, unless labeled sodium-free or low-sodium. ? Canned vegetables, unless labeled sodium-free or low-sodium. ? [place] fries, pizza, tacos, and other fast foods. ? Pickles, olives, ketchup, and other condiments, especially soy sauce, unless labeled sodium-free or low-sodium. Where can you learn more? Go to http://www.gray.com/  Enter V843 in the search box to learn more about \"Low Sodium Diet (2,000 Milligram): Care Instructions. \"  Current as of: August 22, 2019               Content Version: 12.6  © 6490-2280 [employer], [employer]orporated. Care instructions adapted under license by [employer] (which disclaims liability or warranty for this information).  If you have questions about a medical condition or this instruction, always ask your healthcare professional. [place] [address] any warranty or liability for your use of this information. Patient Education        Stopping Smokeless Tobacco Use: Care Instructions  Your Care Instructions     Smokeless tobacco comes in many forms, such as snuff and chewing tobacco:  · Snuff is finely ground tobacco sold in cans or pouches. Most of the time, snuff is used by putting a \"pinch\" or \"dip\" between the lower lip or cheek and the gum. · Chewing tobacco is sold as loose leaves, plugs, or twists. It is chewed or placed between the cheek and the gum or teeth. There are plenty of reasons to stop using smokeless tobacco. These products are harmful. They are not risk-free alternatives to smoking. Smokeless tobacco contains nicotine, which is addicting. Though using smokeless tobacco is less harmful than smoking cigarettes, it can cause serious health problems, such as:  · White patches or red sores in your mouth that can turn into mouth cancer involving the lip, tongue, or cheek. · Tooth loss and other dental problems. · Gum disease. Your gums may pull away from your teeth and not grow back. People who use smokeless tobacco crave the nicotine in it. Giving up smokeless tobacco is much harder than simply changing a habit. Your body has to stop craving the nicotine. It is hard to quit, but you can do it. Many tools are available for people who want to quit using smokeless tobacco. You may find that combining tools works best for you. There are several steps to quitting. First you get ready to quit. Then you get support to help you. After that, you learn new skills and behaviors to quit. For many people, a necessary step is getting and using medicine. Your doctor will help you set up the plan that best meets your needs. You may want to attend a tobacco cessation program. When you choose a program, look for one that has proven success. Ask your doctor for ideas.  You will greatly increase your chances of success if you take medicine as well as get counseling or join a cessation program.  Some of the changes you feel when you first quit smokeless tobacco are uncomfortable. Your body will miss the nicotine at first, and you may feel short-tempered and grumpy. You may have trouble sleeping or concentrating. Medicine can help you deal with these symptoms. You may struggle with changing your habits and rituals. The last step is the tricky one: Be prepared for the urge to use smokeless tobacco to continue for a time. This is a lot to deal with, but keep at it. You will feel better. Follow-up care is a key part of your treatment and safety. Be sure to make and go to all appointments, and call your doctor if you are having problems. It's also a good idea to know your test results and keep a list of the medicines you take. How can you care for yourself at home? · Ask your family, friends, and coworkers for support. You have a better chance of quitting if you have help and support. · Join a support group for people who are trying to quit using smokeless tobacco.  · Set a quit date. Pick your date carefully so that it is not right in the middle of a big deadline or stressful time. After you quit, do not use smokeless tobacco even once. Get rid of all spit cups, cans, and pouches after your last use. Clean your house and your clothes so that they do not smell of tobacco.  · Learn how to be a non-user. Think about ways you can avoid those things that make you reach for tobacco.  ? Learn some ways to deal with cravings, like calling a friend or going for a walk. Cravings often pass. ? Avoid situations that put you at greatest risk for using smokeless tobacco. For some people, it is hard to spend time with friends without dipping or chewing. For others, they might skip a coffee break with coworkers who smoke or use smokeless tobacco.  ? Change your daily routine.  Take a different route to work, or eat a meal in a different place. · Cut down on stress. Calm yourself or release tension by doing an activity you enjoy, such as reading a book, taking a hot bath, or gardening. · Talk to your doctor or pharmacist about nicotine replacement therapy. You still get nicotine, but you do not use tobacco. Nicotine replacement products help you slowly reduce the amount of nicotine you need. Many of these products are available over the counter. They include nicotine patches, gum, lozenges, and inhalers. · Ask your doctor about bupropion (Wellbutrin) or varenicline (Chantix), which are prescription medicines. They do not contain nicotine. They help you by reducing withdrawal symptoms, such as stress and anxiety. · Get regular exercise. Having healthy habits will help your body move past its craving for nicotine. · Be prepared to keep trying. Most people are not successful the first few times they try to quit. Do not get mad at yourself if you use tobacco again. Make a list of things you learned, and think about when you want to try again, such as next week, next month, or next year. Where can you learn more? Go to http://www.gray.com/  Enter J808 in the search box to learn more about \"Stopping Smokeless Tobacco Use: Care Instructions. \"  Current as of: March 12, 2020               Content Version: 12.6  © 8018-8798 [employer]. Care instructions adapted under license by [employer] (which disclaims liability or warranty for this information). If you have questions about a medical condition or this instruction, always ask your healthcare professional. [place] [address] any warranty or liability for your use of this information.          DISCHARGE SUMMARY from Nurse    PATIENT INSTRUCTIONS:    After general anesthesia or intravenous sedation, for 24 hours or while taking prescription Narcotics:  · Limit your activities  · Do not drive and operate hazardous machinery  · Do not make important personal or business decisions  · Do  not drink alcoholic beverages  · If you have not urinated within 8 hours after discharge, please contact your surgeon on call. Report the following to your surgeon:  · Excessive pain, swelling, redness or odor of or around the surgical area  · Temperature over 100.5  · Nausea and vomiting lasting longer than 4 hours or if unable to take medications  · Any signs of decreased circulation or nerve impairment to extremity: change in color, persistent  numbness, tingling, coldness or increase pain  · Any questions    What to do at Home:  Recommended activity: Activity as tolerated. RECOMMENDATIONS FROM DR ADORNO (CARDIOLOGIST):  *CAD RISK FACTOR EDUCATION  *DIET MANAGEMENT EDUCATION  *BLOOD PRESSURE CONTROL  *EXERCISE EDUCATION  *QUIT SMOKING  *NO WEIGHT LIFTING MORE THAN 10 LBS FROM THE RIGHT HAND  *NO DRIVING FOR 24 HOURS  *FOLLOWING WITH YOUR PCP WITHIN A WEEK  *PLEASE RETURN TO ED OR CALL 911 IF NECESSARY      If you experience any of the following symptoms: PLEASE RETURN TO ER OR CALL MD FOR ANY CONCERNS. *  Please give a list of your current medications to your Primary Care Provider. *  Please update this list whenever your medications are discontinued, doses are      changed, or new medications (including over-the-counter products) are added. *  Please carry medication information at all times in case of emergency situations. These are general instructions for a healthy lifestyle:    No smoking/ No tobacco products/ Avoid exposure to second hand smoke  Surgeon General's Warning:  Quitting smoking now greatly reduces serious risk to your health.     Obesity, smoking, and sedentary lifestyle greatly increases your risk for illness    A healthy diet, regular physical exercise & weight monitoring are important for maintaining a healthy lifestyle    You may be retaining fluid if you have a history of heart failure or if you experience any of the following symptoms:  Weight gain of 3 pounds or more overnight or 5 pounds in a week, increased swelling in our hands or feet or shortness of breath while lying flat in bed. Please call your doctor as soon as you notice any of these symptoms; do not wait until your next office visit. The discharge information has been reviewed with the patient. The patient verbalized understanding. Discharge medications reviewed with the patient and appropriate educational materials and side effects teaching were provided. ___________________________________________________________________________________________________________________________________                 Cardiac Catheterization/Angiography Discharge Instructions    *Check the puncture site frequently for swelling or bleeding. If you see any bleeding, lie down and apply pressure over the area with a clean towel or washcloth. Notify your doctor for any redness, swelling, drainage or oozing from the puncture site. Notify your doctor for any fever or chills. *If the leg or arm with the puncture becomes cold, numb or painful, call Dr Bruna Spatz    *Activity should be limited for the next 48 hours. Climb stairs as little as possible and avoid any stooping, bending or strenuous activity for 48 hours. No heavy lifting (anything over 10 pounds) for three days. *Do not drive for 24 hours. *You may resume your usual diet. Drink more fluids than usual.    *Have a responsible person drive you home and stay with you for at least 24 hours after your heart catheterization/angiography. *You may remove the bandage from your Right and Arm in 24 hours. You may shower in 24 hours. No tub baths, hot tubs or swimming for one week. Do not place any lotions, creams, powders, ointments over the puncture site for one week. You may place a clean band-aid over the puncture site each day for 5 days. Change this daily.

## 2020-10-29 NOTE — H&P
Date of Surgery Update:  Darien Morrison was seen and examined. History and physical has been reviewed. The patient has been examined. There have been no significant clinical changes since the completion of the originally dated History and Physical.      Patient assessed and is candidate for moderate sedation.       Signed By: Tracey Daniels MD     October 29, 2020 2:52 PM

## 2020-10-29 NOTE — ROUTINE PROCESS
Bedside and Verbal shift change report given to ERENDIRA Wei RN (oncoming nurse) by Maria Victoria Saunders RN (offgoing nurse).  Report included the following information SBAR, Kardex, Accordion, Recent Results and Cardiac Rhythm SR

## 2020-10-29 NOTE — PROGRESS NOTES
Cardiology Progress Note        Patient: Darien Morrison        Sex: female          DOA: 10/27/2020  YOB: 1970      Age:  47 y.o.        LOS:  LOS: 0 days    Patient seen and examined, chart reviewed  Assessment/Plan     Patient Active Problem List   Diagnosis Code    Hypertension I10    Excessive or frequent menstruation N92.0    Dysmenorrhea N94.6    Chest pain R07.9    Bulging lumbar disc M51.26    Lumbar facet arthropathy M47.816    Neuritis M79.2    Myofascial pain M79.18    Cervical facet joint syndrome M47.812    Pain of left hip joint M25.552    Left foot drop M21.372    Primary osteoarthritis of left hip M16.12    GERD (gastroesophageal reflux disease) K21.9    SEEMA (generalized anxiety disorder) F41.1    Tobacco abuse Z72.0       precordial chest pain   shortness of breath on exertion   family history of premature CAD   current smoker     Echocardiogram revealed  · Left Ventricle: Normal cavity size, wall thickness and systolic function (ejection fraction normal). The estimated EF is 55 - 60%. There is mild (grade 1) left ventricular diastolic dysfunction E/E' ratio = 6.84. Wall Scoring: The left ventricular wall motion is normal.  · Tricuspid Valve: Normal valve structure and no stenosis. Tricuspid regurgitation is inadequate for estimation of right ventricular systolic pressure. Exercise nuclear stress test revealed    1) Exercise perfusion stress test is abnormal. There was transient ischemic dilatation of LV during stress study. 2) The patient performed treadmill exercise using a Doug protocol, completing 9:01 minutes and completing an estimated workload of 69.8 metabolic equivalents (METS). 3) The test was terminated due to shortness of breath, chest pain and achievement of target heart rate. Patient had left sided chest pain prior to starting exercise that got worse during exercise and improved in recovery.    4) The resting heart rate was 89 beats per minute at baseline and increased to 148 beats per minute at peak exercise, which was 87 % of the maximum predicted heart rate. The resting blood pressure was 110/60 mm/Hg and increased to 125/90 mm/Hg, which is a blunted response. 5) Baseline EKG revealed Sinus rhythm, no ST T changes. There were no ST changes during exercise or in recovery. 6) There was mild fixed perfusion defect of small size in apical anterior wall. 7) Calculated LVEF 74%. TID 1.27  8) Good exercise capacity. 9) No previous study to compare. 10) Clinical correlation require. Plan:     Continue aspirin and amlodipine. Start nitro paste. Give full dose of Lovenox. in view of abnormal nuclear stress test in view of worsening of chest pain during exercise and transient ischemic dilatation of left ventricle, persistent chest pain, significant family history of premature coronary artery disease advised about left heart catheterization, coronary angiogram plus or minus PCI. All risks, benefits and alternatives explained to patient and she verbally understood. Discussed with patient about risk of cardiac catheterization including not limited are hematoma, retroperitoneal bleed, pseudoaneurysm, AV fistula, dissection, thrombosis and embolism, radial artery occlusion, myocardial infarction, CVA, TIA, dissection and perforation of great vessels, cardiac perforation, tamponade, atheroembolism, allergy reaction, infection, acute renal failure, arrhythmias, radiation injury, congestive heart failure, respiratory failure, multiorgan failure, death. Patient agreed for procedure. NPO after mid night   Plan discussed with Sendy Lord discussed with patient's nurse. Subjective:    cc:  Complaining of left-sided chest pain      REVIEW OF SYSTEMS:     General: No fevers or chills.   Cardiovascular:  positive chest pain, shortness of breath on exertion,No palpitations, No orthopnea, No PND, No leg swelling, No claudication  Pulmonary: No  dyspnea   Gastrointestinal: No nausea, vomiting, bleeding  Neurology: No Dizziness    Objective:      Visit Vitals  /78   Pulse 92   Temp 97.8 °F (36.6 °C)   Resp 18   Ht 5' 4\" (1.626 m)   Wt 66.7 kg (147 lb)   SpO2 100%   Breastfeeding No   BMI 25.23 kg/m²     Body mass index is 25.23 kg/m². Physical Exam:  General Appearance: Comfortable, not using accessory muscles of respiration. HEENT: ROXANNA. HEAD: Atraumatic  NECK: No JVD, no thyroidomeglay. CAROTIDS: No bruit  LUNGS: Clear bilaterally. HEART: S1+S2 audible, no murmur, no pericardial rub. ABD: Non-tender, BS Audible    EXT: No edema, and no cyanosis. VASCULAR EXAM: Pulses are intact. PSYCHIATRIC EXAM: Mood is appropriate. MUSCULOSKELETAL: Grossly no joint deformity.   NEUROLOGICAL: AAO times 3, No motor and sensory deficit    Medication:  Current Facility-Administered Medications   Medication Dose Route Frequency    nitroglycerin (NITROBID) 2 % ointment 0.5 Inch  0.5 Inch Topical BID    aspirin chewable tablet 81 mg  81 mg Oral DAILY    nitroglycerin (NITROSTAT) tablet 0.4 mg  0.4 mg SubLINGual Q5MIN PRN    oxyCODONE IR (ROXICODONE) tablet 5 mg  5 mg Oral Q4H PRN    morphine 10 mg/ml injection 4 mg  4 mg IntraVENous Q4H PRN    calcium carbonate (TUMS) chewable tablet 200 mg [elemental]  200 mg Oral TID WITH MEALS    LORazepam (ATIVAN) injection 1 mg  1 mg IntraVENous Q6H PRN    alum-mag hydroxide-simeth (MYLANTA) oral suspension 30 mL  30 mL Oral Q4H PRN    citalopram (CELEXA) tablet 20 mg  20 mg Oral DAILY    amLODIPine (NORVASC) tablet 5 mg  5 mg Oral DAILY               Lab/Data Reviewed:       Recent Labs     10/28/20  0325 10/27/20  0930   WBC 6.4 8.2   HGB 13.5 15.4   HCT 41.2 45.2*    242     Recent Labs     10/28/20  0325 10/27/20  0930    138   K 5.0 3.8    103   CO2 32 28   GLU 86 96   BUN 19* 15   CREA 0.93 0.93   CA 9.7 9.6       Signed By: Jackqulyn Bernheim, MD     October 28, 2020

## 2020-10-29 NOTE — PROGRESS NOTES
Hospitalist Progress Note    Patient: Chuyita Nichols MRN: [id number]  CSN: [id number]    YOB: 1970  Age: 47 y.o. Sex: female    DOA: 10/27/2020 LOS:  LOS: 0 days          Chief Complaint:    Chest pain    Assessment/Plan     Carmelina Hong is a 47 y.o. female with past medical history of active tobacco abuse who presents to the emergency department complaining of chest pain.     Acute chest pain -  Cardiac enzymes did not titrate up  Echocardiogram within normal limits  Stress test yesterday abnormal   Cardiac catherization today     GERD -   Controlled   mylanta prn   Continue omeprazole 80mg every day      SEEMA -  Resume home citalopram      Hypertension -  Resume home Norvasc 5mg po qd     Tobacco abuse -  Advised patient on permanent cessation     DVT/GI prophylaxis -ordered    Disposition: Home  Patient Active Problem List   Diagnosis Code    Hypertension I10    Excessive or frequent menstruation N92.0    Dysmenorrhea N94.6    Chest pain R07.9    Bulging lumbar disc M51.26    Lumbar facet arthropathy M47.816    Neuritis M79.2    Myofascial pain M79.18    Cervical facet joint syndrome M47.812    Pain of left hip joint M25.552    Left foot drop M21.372    Primary osteoarthritis of left hip M16.12    GERD (gastroesophageal reflux disease) K21.9    SEEMA (generalized anxiety disorder) F41.1    Tobacco abuse Z72.0       Subjective:     is at bedside. Transportation is here to  patient for her cardiac cath. She is feeling very anxious; reassured her.      Review of systems:    Constitutional: denies fevers, chills, myalgias  Respiratory: denies SOB, cough  Cardiovascular: denies chest pain, palpitations  Gastrointestinal: denies nausea, vomiting, diarrhea      Vital signs/Intake and Output:  Visit Vitals  /76   Pulse 75   Temp 97.9 °F (36.6 °C)   Resp 18   Ht 5' 4\" (1.626 m)   Wt 66.7 kg (147 lb)   SpO2 97%   Breastfeeding No   BMI 25.23 kg/m²     Current Shift:  No intake/output data recorded. Last three shifts:  No intake/output data recorded. Exam:    General: Well developed, alert, NAD, OX3  Head/Neck: NCAT, supple, No masses, No lymphadenopathy  CVS:Regular rate and rhythm, no M/R/G, S1/S2 heard, no thrill  Lungs:Clear to auscultation bilaterally, no wheezes, rhonchi, or rales  Abdomen: Soft, Nontender, No distention, Normal Bowel sounds, No hepatomegaly  Extremities: No C/C/E, pulses palpable 2+  Skin:normal texture and turgor, no rashes, no lesions  Neuro:grossly normal , follows commands  Psych:appropriate                Labs: Results:       Chemistry Recent Labs     10/29/20  0330 10/28/20  0325 10/27/20  0930   GLU 89 86 96    140 138   K 4.6 5.0 3.8    103 103   CO2 32 32 28   BUN 16 19* 15   CREA 0.94 0.93 0.93   CA 8.9 9.7 9.6   AGAP 4 5 7   BUCR 17 20 16   AP 71  --  68   TP 6.4  --  7.7   ALB 3.5  --  4.1   GLOB 2.9  --  3.6   AGRAT 1.2  --  1.1      CBC w/Diff Recent Labs     10/29/20  0330 10/28/20  0325 10/27/20  0930   WBC 6.6 6.4 8.2   RBC 4.30 4.23 4.71   HGB 13.9 13.5 15.4   HCT 41.4 41.2 45.2*    222 242   GRANS  --   --  71   LYMPH  --   --  20*   EOS  --   --  1      Cardiac Enzymes Recent Labs     10/28/20  0325 10/27/20  1509   CPK 62 60   CKND1 1.9 CALCULATION NOT PERFORMED WHEN RESULT IS BELOW LINEAR LIMIT      Coagulation Recent Labs     10/29/20  0330   PTP 13.0   INR 1.0       Lipid Panel No results found for: CHOL, CHOLPOCT, CHOLX, CHLST, CHOLV, 102228, HDL, HDLP, LDL, LDLC, DLDLP, 201200, VLDLC, VLDL, TGLX, TRIGL, TRIGP, TGLPOCT, CHHD, CHHDX   BNP No results for input(s): BNPP in the last 72 hours.    Liver Enzymes Recent Labs     10/29/20  0330   TP 6.4   ALB 3.5   AP 71      Thyroid Studies No results found for: T4, T3U, TSH, TSHEXT, TSHEXT     Procedures/imaging: see electronic medical records for all procedures/Xrays and details which were not copied into this note but were reviewed prior to creation of Cheo Fox MD

## 2020-10-29 NOTE — ROUTINE PROCESS
Cardiac Cath Lab:  Pre Procedure Chart Check Patients chart was accessed and reviewed for possible and/or scheduled procedure. Creatinine Clearance: 
Serum creatinine: 0.94 mg/dL 10/29/20 0330 Estimated creatinine clearance: 67.3 mL/min Total Contrast  Load: 
3 x estimated clearance amount=  201.9ml 
 
75% of Contrast Load: 0.75 x Total Contrast Load=    151.4ml Recent Labs 10/29/20 
0330 10/28/20 
0325 WBC 6.6 6.4  
RBC 4.30 4.23  
HCT 41.4 41.2 HGB 13.9 13.5  222 INR 1.0  --   
PTP 13.0  --   
 140  
K 4.6 5.0  
BUN 16 19* CREA 0.94 0.93 GFRAA >60 >60 GFRNA >60 >60  
CA 8.9 9.7 CPK  --  62  
CKMB  --  1.2 CKND1  --  1.9  
TROIQ  --  <0.02  
 
 
BMI: Body mass index is 25.23 kg/m². ALLERGIES:  
Allergies Allergen Reactions  Percocet [Oxycodone-Acetaminophen] Nausea and Vomiting  Advair Diskus [Fluticasone Propion-Salmeterol] Nausea and Vomiting  Ibuprofen Nausea and Vomiting  Tylenol [Acetaminophen] Other (comments) Causes upset stomach Lines: 
  
  
Peripheral IV 10/27/20 Right Antecubital (Active) Site Assessment Clean, dry, & intact 10/29/20 0450 Phlebitis Assessment 0 10/29/20 0450 Infiltration Assessment 0 10/29/20 0450 Dressing Status Clean, dry, & intact 10/29/20 0450 Dressing Type Transparent;Tape 10/29/20 0450 Hub Color/Line Status Pink 10/29/20 0450 Action Taken Open ports on tubing capped 10/29/20 0450 Alcohol Cap Used Yes 10/29/20 0450 History: 
 
Past Medical History:  
Diagnosis Date  Anxiety  Arthritis  Asthma   
 pt states she does not have asthma  Chronic pain Chronic Back Pain  Depression  Gastrointestinal disorder  GERD (gastroesophageal reflux disease)   
 well controlled w/ meds  Hypertension   
 no medications currently; off [id number]  
 IBS (irritable bowel syndrome)  Other ill-defined conditions(799.89)   
 bulging disc  Other ill-defined conditions(799.89) sinusitis  Tattoo TATTOO Past Surgical History:  
Procedure Laterality Date  ENDOSCOPY, COLON, DIAGNOSTIC    
 HX BREAST BIOPSY    
 left  HX CHOLECYSTECTOMY  2016  HX CYST INCISION AND DRAINAGE    
 left  HX GYN  2011  
 cysts removed from both ovaries  HX HYSTERECTOMY partial   
 LAP,CHOLECYSTECTOMY  2-25-16 Dr. Ligia Bermeo Patient Active Problem List  
Diagnosis Code  Hypertension I10  
 Excessive or frequent menstruation N92.0  Dysmenorrhea N94.6  Chest pain R07.9  Bulging lumbar disc M51.26  
 Lumbar facet arthropathy M47.816  Neuritis M79.2  Myofascial pain M79.18  Cervical facet joint syndrome M47.812  Pain of left hip joint M25.552  Left foot drop M21.372  Primary osteoarthritis of left hip M16.12  
 GERD (gastroesophageal reflux disease) K21.9  SEEMA (generalized anxiety disorder) F41.1  Tobacco abuse Z72.0

## 2020-10-29 NOTE — PROCEDURES
[facility], Coronary angiogram and iFR of mid RCA done via right radial approach. Coronary anatomy described below with noted coronary artery disease. iFR of mRCA 1.0    Mid RCA has eccentric 50-60% stenosis. iFR is 1.0 non ischemia. LAD and LCx has luminal irregularities. LV gram was not done. LVEDP 2 mm hg. No significant gradient on pull back. Patient tolerated procedure well. Scant blood loss. No complications. No specimen removed. Recommendation:    Medication considered:   Aspirin, beta blocker, ACEI, Nitrates and statin   I  CAD risk factor education  Diet education  Control cholesterol  Blood pressure control  Exercise education: Age and functional status appropriate. Strongly advised to quit smoking  Consider evaluation for other sources of reported symptoms. No weight lifting no more than 10 lbs from right hand for 1 week. No driving for 24 hours. Can be discharged from cardiac stand point post recovery. Plan discussed with patient.

## 2020-10-29 NOTE — PROGRESS NOTES
Back from procedure. Vasc band intact to right wrist.No bleeding or swelling. Denies pain. Diet given.  at bedside. 36 Dr. Nii Feliciano in.  1820 TR band released, sterile 2x2 & tegaderm applied. No bleeding or swelling. 1061 Report called to tele nurse [place] Cano. 1845 Transferred to room 356 via bed in stable condition.

## 2020-10-29 NOTE — PROGRESS NOTES
Problem: Falls - Risk of  Goal: *Absence of Falls  Description: Document Albino Messing Fall Risk and appropriate interventions in the flowsheet.   Outcome: Progressing Towards Goal  Note: Fall Risk Interventions:            Medication Interventions: Teach patient to arise slowly, Patient to call before getting OOB, Evaluate medications/consider consulting pharmacy         History of Falls Interventions: Room close to nurse's station

## 2020-10-30 NOTE — DISCHARGE SUMMARY
Discharge Summary    Patient: Jose Bolivar MRN: [id number]  CSN: [id number]    YOB: 1970  Age: 47 y.o.   Sex: female    DOA: 10/27/2020 LOS:  LOS: 0 days   Discharge Date:      Primary Care Provider:  Beryl Kidd MD    Admission Diagnoses: Chest pain [R07.9]  Chest pain [R07.9]  Chest pain [R07.9]    Discharge Diagnoses:    Problem List as of 10/29/2020 Date Reviewed: 1/30/2018          Codes Class Noted - Resolved    GERD (gastroesophageal reflux disease) ICD-10-CM: K21.9  ICD-9-CM: 530.81  10/27/2020 - Present        SEEMA (generalized anxiety disorder) ICD-10-CM: F41.1  ICD-9-CM: 300.02  10/27/2020 - Present        Tobacco abuse ICD-10-CM: Z72.0  ICD-9-CM: 305.1  10/27/2020 - Present        Primary osteoarthritis of left hip ICD-10-CM: M16.12  ICD-9-CM: 715.15  1/30/2018 - Present        Left foot drop ICD-10-CM: M21.372  ICD-9-CM: 736.79  1/25/2018 - Present        Pain of left hip joint ICD-10-CM: M25.552  ICD-9-CM: 719.45  11/27/2017 - Present        Cervical facet joint syndrome ICD-10-CM: K82.658  ICD-9-CM: 723.8  10/9/2017 - Present        Bulging lumbar disc ICD-10-CM: M51.26  ICD-9-CM: 722.10  7/14/2017 - Present        Lumbar facet arthropathy ICD-10-CM: M47.816  ICD-9-CM: 721.3  7/14/2017 - Present        Neuritis ICD-10-CM: M79.2  ICD-9-CM: 729.2  7/14/2017 - Present        Myofascial pain ICD-10-CM: M79.18  ICD-9-CM: 729.1  7/14/2017 - Present        * (Principal) Chest pain ICD-10-CM: R07.9  ICD-9-CM: 786.50  11/24/2014 - Present        Excessive or frequent menstruation (Chronic) ICD-10-CM: N92.0  ICD-9-CM: 626.2  1/23/2013 - Present        Dysmenorrhea (Chronic) ICD-10-CM: N94.6  ICD-9-CM: 625.3  1/23/2013 - Present        Hypertension ICD-10-CM: I10  ICD-9-CM: 401.9  Unknown - Present              Discharge Medications:     Current Discharge Medication List      CONTINUE these medications which have NOT CHANGED    Details   topiramate (TOPAMAX) 25 mg tablet TAKE 3 TABS BY MOUTH TWO (2) TIMES DAILY (WITH MEALS). Qty: 180 Tab, Refills: 1      citalopram (CELEXA) 10 mg tablet TAKE 1 TABLET BY ORAL ROUTE EVERY DAY  Refills: 5      montelukast (SINGULAIR) 10 mg tablet TAKE 1 TABLET BY ORAL ROUTE EVERY DAY IN THE EVENING  Refills: 3      baclofen (LIORESAL) 10 mg tablet Take 1 Tab by mouth three (3) times daily as needed. Qty: 60 Tab, Refills: 1      HYDROcodone-acetaminophen (NORCO) 7.5-325 mg per tablet Take 1 Tab by mouth every six (6) hours as needed for Pain (for severe pain). Max Daily Amount: 4 Tabs. Qty: 28 Tab, Refills: 0    Associated Diagnoses: Neuritis; Bulging lumbar disc      albuterol (ACCUNEB) 1.25 mg/3 mL nebu Take 3 mL by inhalation every four (4) hours as needed (wheezing). Qty: 25 Each, Refills: 0      albuterol (PROVENTIL HFA, VENTOLIN HFA, PROAIR HFA) 90 mcg/actuation inhaler Take 2 Puffs by inhalation every four (4) hours as needed for Wheezing. Qty: 1 Inhaler, Refills: 0      Nebulizer & Compressor machine DX: Bronchial Asthma  Qty: 1 Each, Refills: 0      Nebulizer Accessories kit Use as directed  Qty: 1 Kit, Refills: 0      SYMBICORT 160-4.5 mcg/actuation HFA inhaler inhale 2 puffs by mouth twice a day IN THE MORNING AND EVENING  Refills: 0      dicyclomine (BENTYL) 10 mg capsule take 1 capsule by mouth three times a day  Refills: 0      famotidine (PEPCID) 40 mg tablet Take 40 mg by mouth daily. celecoxib (CELEBREX) 100 mg capsule Take  by mouth two (2) times a day. losartan (COZAAR) 100 mg tablet Take 100 mg by mouth daily. amLODIPine (NORVASC) 5 mg tablet Take 5 mg by mouth daily. ALPRAZolam (XANAX) 0.5 mg tablet Take 1 Tab by mouth nightly as needed for Anxiety. Qty: 7 Tab, Refills: 0      cholecalciferol (VITAMIN D3) 1,000 unit tablet Take 3,000 Units by mouth daily. cetirizine (ZYRTEC) 10 mg tablet Take  by mouth daily as needed.              Discharge Condition: improved    Procedures : cardiac stress test, cardiac catherization Consults: Cardiology, Dr. Shorty Vance  /80 (BP 1 Location: Right arm, BP Patient Position: At rest;Supine)   Pulse 98   Temp 98.6 °F (37 °C)   Resp 17   Ht 5' 4\" (1.626 m)   Wt 66.7 kg (147 lb)   SpO2 98%   Breastfeeding No   BMI 25.23 kg/m²     General: Awake, cooperative, no acute distress    HEENT: NC, Atraumatic. PERRLA, EOMI. Anicteric sclerae. Lungs:  CTA Bilaterally. No Wheezing/Rhonchi/Rales. Heart:  Regular  rhythm,  No murmur, No Rubs, No Gallops  Abdomen: Soft, Non distended, Non tender. +Bowel sounds,   Extremities: No c/c/e  Psych:   Not anxious or agitated. Neurologic:  No acute neurological deficits. Admission HPI : Enid Justin is a 47 y.o. female with past medical history of active tobacco use who presents to the emergency department C/O chest pain.  Per patient the chest pain started 10 days ago and is sharp and constant.  She states is located on the left side of her chest and is constant without clear relieving or exacerbating factors but radiates to her left shoulder and down left arm. She states that it has been getting worse over the 10 days. Amada Cortes states is associated with shortness of breath that has also been getting worse over the 10 days. Amada Cortes denies any fever, calf swelling, recent travel, sick contacts, bowel or urinary complaints. Amada Cortes has been seen this week by her primary care doctor and cardiology who had scheduled an echo and stress test but these have not yet occurred.  She does have significant family history of heart disease in her father and paternal grandfather. Father had a MI at 39 and 61 as well as a CVA and grandfather had an MI in his 76s.      Hospital Course : Beth Hong is a 47 y.o. female with past medical history of active tobacco abuse who presents to the emergency department complaining of chest pain.     Acute chest pain -  Cardiac enzymes did not titrate up  Echocardiogram within normal limits  Stress test yesterday abnormal   Cardiac catherization   DC home with ASA, beta blocker, ace inhibitor, nitrates and statin, quit smoking and have good cholesterol and blood pressure control      GERD -   Controlled   mylanta prn   Continue omeprazole 80mg every day      SEEMA -  Resume home citalopram      Hypertension -  Norvasc 5mg po qd     Tobacco abuse -  Advised patient on permanent cessation       Activity: as tolerated     Diet: cardiac     Follow-up:     CAD risk factor education  Diet education  Control cholesterol  Blood pressure control  Exercise education: Age and functional status appropriate. Strongly advised to quit smoking  Consider evaluation for other sources of reported symptoms. No weight lifting no more than 10 lbs from right hand for 1 week. No driving for 24 hours. Can be discharged from cardiac stand point post recovery.    Plan discussed with patient.     Disposition: Home     Minutes spent on discharge: greater than 35 minutes      Labs: Results:       Chemistry Recent Labs     10/29/20  0330 10/28/20  0325 10/27/20  0930   GLU 89 86 96    140 138   K 4.6 5.0 3.8    103 103   CO2 32 32 28   BUN 16 19* 15   CREA 0.94 0.93 0.93   CA 8.9 9.7 9.6   AGAP 4 5 7   BUCR 17 20 16   AP 71  --  68   TP 6.4  --  7.7   ALB 3.5  --  4.1   GLOB 2.9  --  3.6   AGRAT 1.2  --  1.1      CBC w/Diff Recent Labs     10/29/20  0330 10/28/20  0325 10/27/20  0930   WBC 6.6 6.4 8.2   RBC 4.30 4.23 4.71   HGB 13.9 13.5 15.4   HCT 41.4 41.2 45.2*    222 242   GRANS  --   --  71   LYMPH  --   --  20*   EOS  --   --  1      Cardiac Enzymes Recent Labs     10/28/20  0325 10/27/20  1509   CPK 62 60   CKND1 1.9 CALCULATION NOT PERFORMED WHEN RESULT IS BELOW LINEAR LIMIT      Coagulation Recent Labs     10/29/20  0330   PTP 13.0   INR 1.0       Lipid Panel Lab Results   Component Value Date/Time    Cholesterol, total 195 10/28/2020 03:25 AM    HDL Cholesterol 71 (H) 10/28/2020 03:25 AM    LDL, calculated 106.8 (H) 10/28/2020 03:25 AM    VLDL, calculated 17.2 10/28/2020 03:25 AM    Triglyceride 86 10/28/2020 03:25 AM    CHOL/HDL Ratio 2.7 10/28/2020 03:25 AM      BNP No results for input(s): BNPP in the last 72 hours. Liver Enzymes Recent Labs     10/29/20  0330   TP 6.4   ALB 3.5   AP 71      Thyroid Studies No results found for: T4, T3U, TSH, TSHEXT         Significant Diagnostic Studies: Xr Chest Port    Result Date: 10/27/2020  EXAM: XR CHEST PORT CLINICAL INDICATION/HISTORY: chest pain -Additional: Asthma COMPARISON: 4/24/2018 TECHNIQUE: Frontal view of the chest _______________ FINDINGS: HEART AND MEDIASTINUM: Normal cardiac size and mediastinal contours. LUNGS AND PLEURAL SPACES: No focal pneumonic consolidation, pneumothorax, or pleural effusion. BONY THORAX AND SOFT TISSUES: No acute osseous abnormality _______________     IMPRESSION: No acute radiographic cardiopulmonary abnormality. No results found for this or any previous visit.         CC: Wendi Washington MD

## 2020-10-30 NOTE — ADT AUTH CERT NOTES
Chest Pain - Care Day (10/29/2020) by Dung Lanier Review Status  Review Entered Completed  10/29/2020 15:49   
   
Criteria Review Care Day: 3 Care Date: 10/29/2020 Level of Care: Telemetry Guideline Day 1 Level Of Care (X) ICU, [B] intermediate care, [C] or telemetry [D]   
10/29/2020 15:49:02 EDT by Samir Valentin telemetry Clinical Status ( ) * Clinical Indications met [E] Routes (X) IV or oral hydration, medications 10/29/2020 15:49:02 EDT by Yahaira Quach   
  Norvasc 5mg po x1 Ativan 1mg iv x1 Interventions (X) Arrange stress test, invasive procedures as needed 10/29/2020 15:49:02 EDT by Leeroy Zavala Left heart cath * Milestone Additional Notes /91-76-20-97% RA Bruce Klinefelter is a 47 y.o. female with past medical history of active tobacco abuse who presents to the emergency department complaining of chest pain.   
    
Acute chest pain -   
Cardiac enzymes did not titrate up Echocardiogram within normal limits Stress test yesterday abnormal   
Cardiac catherization today   
    
GERD - Controlled   
mylanta prn   
Continue omeprazole 80mg every day   
    
SEEMA - Resume home citalopram   
    
Hypertension - Resume home Norvasc 5mg po qd   
    
Tobacco abuse - Advised patient on permanent cessation   
    
DVT/GI prophylaxis -ordered Review of systems:   
    
Constitutional: denies fevers, chills, myalgias Respiratory: denies SOB, cough Cardiovascular: denies chest pain, palpitations Gastrointestinal: denies nausea, vomiting, diarrhea Physical exam:   
General: Well developed, alert, NAD, OX3 Head/Neck: NCAT, supple, No masses, No lymphadenopathy CVS:Regular rate and rhythm, no M/R/G, S1/S2 heard, no thrill Lungs:Clear to auscultation bilaterally, no wheezes, rhonchi, or rales Abdomen: Soft, Nontender, No distention, Normal Bowel sounds, No hepatomegaly Extremities: No C/C/E, pulses palpable 2+ Skin:normal texture and turgor, no rashes, no lesions Neuro:grossly normal , follows commands Psych:appropriate Alt 83     
Spot check pulse ox NPO Cardiac monitoring   
  
   
PA Letter of Determination by Naila Ji Review Status  Review Entered In Primary  10/29/2020 14:56   
   
Criteria Review We recommend that the following pt's hospitalization under OBSERVATION [104] 
status   is upgraded to INPATIENT If you agree, please place a new ADMIT order 
in [facility]  as recommended. 
  
  
Name:          Katie Cohen :            1970 [facility]# :         [id number] Insurance:   [address] [place] [address]  
  
Clinical summary        This pt presented with CP, ruled out ACS, but continued to 
have sx, failed stress test and now being taken to cath lab. Vitals                           Stable Labs and Imaging       +ve stress test  
MCG criteria applies   YES Comments       Angina M -40  
  
  
This chart was reviewed at 2:28 PM 10/29/2020 
  
Brandy Lim MD MD  
Physician Advisor [facility] Cell [phone]  
   
Chest Pain - Care Day (10/28/2020) by Naila Ji Review Status  Review Entered Completed  10/28/2020 13:10   
   
Criteria Review Care Day: 2 Care Date: 10/28/2020 Level of Care: Telemetry Guideline Day 1 Level Of Care (X) ICU, [B] intermediate care, [C] or telemetry [D]   
10/28/2020 13:10:16 EDT by [address] [facility] Clinical Status ( ) * Clinical Indications met [E] Routes (X) Diet as tolerated 10/28/2020 13:10:16 EDT by Alvarado Yanes   
  Cardiac diet   
(X) IV or oral hydration, medications 10/28/2020 13:10:16 EDT by Alvarado Yanes   
  oral hydration Bun 19 Continuous pulse ox Cardiac monitoring x24hrs Norvasc 5mg po x1 Interventions (X) Arrange stress test, invasive procedures as needed 10/28/2020 13:10:16 EDT by Светлана White   
  Nuclear cardiac stress test   
Medications (X) Possible antiplatelet agents, anticoagulants 10/28/2020 13:10:16 EDT by Светлана White   
  Aspirin 81mg po x1   
* Milestone Additional Notes MD progress note pending /24-45-% RA Acute chest pain   
   
Chest Pain - Care Day  (10/27/2020) by Vlad Larson Review Status  Review Entered Completed  10/28/2020 10:07   
   
Criteria Review Care Day: 1 Care Date: 10/27/2020 Level of Care: Telemetry Guideline Day 1 Level Of Care (X) ICU, [B] intermediate care, [C] or telemetry [D]   
10/28/2020 10:07:49 EDT by La Nena King Clinical Status ( ) * Clinical Indications met [E] Routes (X) Diet as tolerated 10/28/2020 10:07:49 EDT by Светлана White   
  as tolerated NPO effective midnight Medications (X) Possible antiplatelet agents, anticoagulants 10/28/2020 10:07:49 EDT by Светлана White   
  Asprin 324mg po x1   
* Milestone Additional Notes 97.8-141/17-57-% RA   
  
EMERGENCY DEPARTMENT HISTORY AND PHYSICAL EXAM   
  
47 y.o. female with PMHX of active tobacco smoking who presents to the emergency department C/O chest pain.  Per patient the chest pain started 10 days ago. Reymundo thomason is located on the left side of her chest and is constant without clear relieving or exacerbating factors.  She states it is sharp in nature and has been getting worse over the 10 days. Reymundo thomason is associated with shortness of breath that has also been getting worse over the 10 days.  She denies any fever, calf swelling, recent travel, sick contacts, bowel or urinary complaints. Reymundo Farr has been seen this week by her primary care doctor and cardiology who had scheduled an echo and stress test but these have not yet occurred.  She does have significant family history of heart disease in her father. ED Review of Systems Constitutional: Negative for fever. Respiratory: Positive for shortness of breath.     
Cardiovascular: Positive for chest pain. Gastrointestinal: Negative for vomiting. All other systems reviewed and are negative Physical Exam:   
Constitutional: Non toxic appearing, moderate distress Head: Normocephalic, Atraumatic Eyes: EOMI Neck: Supple Cardiovascular: Regular rate and rhythm, no murmurs, rubs, or gallops Chest: Normal work of breathing and chest excursion bilaterally Lungs: Clear to ausculation bilaterally Abdomen: Soft, non tender, non distended Back: No evidence of trauma or deformity Extremities: No evidence of trauma or deformity, no LE edema Skin: Warm and dry, normal cap refill Neuro: Alert and appropriate Psychiatric: Normal mood and affect CC: chest pain History and Physical:   
47 y.o. female with past medical history of active tobacco use who presents to the emergency department C/O chest pain.  Per patient the chest pain started 10 days ago and is sharp and constant.  She states is located on the left side of her chest and is constant without clear relieving or exacerbating factors but radiates to her left shoulder and down left arm. She states that it has been getting worse over the 10 days. Farooq Del Castillo states is associated with shortness of breath that has also been getting worse over the 10 days. Farooq Del Castillo denies any fever, calf swelling, recent travel, sick contacts, bowel or urinary complaints. Farooq Del Castillo has been seen this week by her primary care doctor and cardiology who had scheduled an echo and stress test but these have not yet occurred.  She does have significant family history of heart disease in her father and paternal grandfather. Father had a MI at 39 and 61 as well as a CVA and grandfather had an MI in his 76s.    
Chuyita Nichols is a 47 y.o. female with past medical history of active tobacco abuse who presents to the emergency department complaining of chest pain.   
    
Acute chest pain - Admit to telemetry for observation Cardiology consulted, Dr. Denver Vicente Lovenox started in the emergency room per Dr. Denver Vicente Echocardiogram today per Dr. Denver Vicente, likely stress test tomorrow Aspirin and GI cocktail gave in the emergency room Will order as needed nitro and morphine for chest pain Trend Cardiac enzymes Ativan prn   
    
GERD -   
GI cocktail x2   
mylanta prn Order home omeprazole 80mg every day   
    
SEEMA - Resume home citalopram   
    
Hypertension - Resume home Norvasc 5mg po qd Echo:   
Left Ventricle: Normal cavity size, wall thickness and systolic function (ejection fraction normal). The estimated EF is 55 - 60%. There is mild (grade 1) left ventricular diastolic dysfunction E/E' ratio = 6.84. Wall Scoring: The left ventricular wall motion is normal.   
Tricuspid Valve: Normal valve structure and no stenosis. Tricuspid regurgitation is inadequate for estimation of right ventricular systolic pressure. CXR- No acute radiographic cardiopulmonary abnormality Hct 45.2 Spot check pulse ox Cardiac monitoring x 48hrs Consult cardiology Continuous pulse ox Ativan 1mg iv x1 Morphine 4mg iv x1 Nitro 0.4mg SL x1 Xanax 0.5mg po x1 Lovenox 70mg sc x1   
  
  
   
Chest Pain - Clinical Indications for Admission to Inpatient Care by Aristides Lawrence Status  Review Entered Completed  10/28/2020 10:05   
   
Criteria Review Clinical Indications for Admission to Inpatient Care Most Recent : Elmo Mcgrath Most Recent Date: 10/28/2020 10:05:55 EDT ( ) Admission is indicated for chest pain and  1 or more  of the following  (1) (2) (3) (4) (5)   
(6):   
   ( ) Respiratory distress   
   10/28/2020 10:05:55 EDT by Elmo Mcgrath   
     shortness of breath    ( ) Chest pain indicative of serious diagnosis other than coronary artery disease (eg, aortic   
   dissection)   
   10/28/2020 10:05:55 EDT by Darin Orellana   
     chest pain x 10 days

## 2020-10-30 NOTE — PROGRESS NOTES
1900 Bedside and Verbal shift change report given to WESLEY Phillip (oncoming nurse) by ERENDIRA Coleman RN (offgoing nurse). Report included the following information SBAR, Kardex, MAR, Accordion and Recent Results. 80  Spoke with Dr. Macie Hernández about Dr. Montie Crigler clearing the patient for discharge but the patient does not have discharge orders. Dr. Macie Hernández informed that she will contact Dr. Zaira Thomason about the discharge order. 1948 Dr. Macie Hernández informed that Dr. Zaira Thomason will discharge the patient. 2005 Nurse questioned discharge order without additional medication management since Dr. Montie Crigler 's note states to medically manage. 2008 Dr. Macie Hernández verified with this nurse that the patient is being discharged with cholesterol medication and aspirin. 2020 Dr. Zaira Thomason informed that the discharge orders are in.   2032 Pt walked downstairs at this time.

## 2020-11-03 ENCOUNTER — PATIENT OUTREACH (OUTPATIENT)
Dept: OTHER | Age: 50
End: 2020-11-03

## 2020-11-03 NOTE — PROGRESS NOTES
Patient on report as eligible for Case Management. Pt was admitted to [facility] [place] 10/27/2020. Admission dates: 10/27/2020 - 10/29/2020. Diagnosis: CP and active tobacco abuse     Procedures    LEFT HEART CATH / CORONARY ANGIOGRAPHY [[id number] (Custom)]    FRACTIONAL FLOW RESERVE    Coronary anatomy described below with noted coronary artery disease. iFR of mRCA 1.0    Mid RCA has eccentric 50-60% stenosis. iFR is 1.0 non ischemia. LAD and LCx has luminal irregularities    Left discreet message on voicemail with this CM contact information. Will attempt to contact again to offer [address] [place] [address] Management services.

## 2020-11-04 ENCOUNTER — PATIENT OUTREACH (OUTPATIENT)
Dept: OTHER | Age: 50
End: 2020-11-04

## 2020-11-04 NOTE — PROGRESS NOTES
Patient identified as eligible for [address] [place] [address] services. Second telephone outreach attempted. Left discreet voicemail with this CM confidential contact information. Will send UTR letter via [address]

## 2020-11-12 ENCOUNTER — PATIENT OUTREACH (OUTPATIENT)
Dept: OTHER | Age: 50
End: 2020-11-12

## 2020-11-12 NOTE — PROGRESS NOTES
Patient identified as eligible for [address] [place] [address] services. Third telephone outreach attempted. Left discreet voicemail with this CM confidential contact information.

## 2020-12-03 ENCOUNTER — PATIENT OUTREACH (OUTPATIENT)
Dept: OTHER | Age: 50
End: 2020-12-03

## 2021-02-05 ENCOUNTER — HOSPITAL ENCOUNTER (OUTPATIENT)
Dept: LAB | Age: 51
Discharge: HOME | End: 2021-02-05
Payer: COMMERCIAL

## 2021-02-05 LAB
ALBUMIN SERPL-MCNC: 3.7 G/DL (ref 3.4–5)
ALBUMIN/GLOB SERPL: 1.2 {RATIO} (ref 0.8–1.7)
ALP SERPL-CCNC: 57 U/L (ref 45–117)
ALT SERPL-CCNC: 34 U/L (ref 13–56)
ANION GAP SERPL CALC-SCNC: 7 MMOL/L (ref 3–18)
AST SERPL-CCNC: 18 U/L (ref 10–38)
BASOPHILS # BLD: 0 K/UL (ref 0–0.1)
BASOPHILS NFR BLD: 0 % (ref 0–2)
BILIRUB SERPL-MCNC: 0.3 MG/DL (ref 0.2–1)
BUN SERPL-MCNC: 15 MG/DL (ref 7–18)
BUN/CREAT SERPL: 17 (ref 12–20)
CALCIUM SERPL-MCNC: 8.9 MG/DL (ref 8.5–10.1)
CHLORIDE SERPL-SCNC: 109 MMOL/L (ref 100–111)
CHOLEST SERPL-MCNC: 136 MG/DL
CO2 SERPL-SCNC: 27 MMOL/L (ref 21–32)
CREAT SERPL-MCNC: 0.9 MG/DL (ref 0.6–1.3)
DIFFERENTIAL METHOD BLD: ABNORMAL
EOSINOPHIL # BLD: 0.2 K/UL (ref 0–0.4)
EOSINOPHIL NFR BLD: 5 % (ref 0–5)
ERYTHROCYTE [DISTWIDTH] IN BLOOD BY AUTOMATED COUNT: 12.5 % (ref 11.6–14.5)
EST. AVERAGE GLUCOSE BLD GHB EST-MCNC: 88 MG/DL
GLOBULIN SER CALC-MCNC: 3.1 G/DL (ref 2–4)
GLUCOSE SERPL-MCNC: 89 MG/DL (ref 74–99)
HBA1C MFR BLD: 4.7 % (ref 4.2–5.6)
HCT VFR BLD AUTO: 37.8 % (ref 35–45)
HDLC SERPL-MCNC: 91 MG/DL (ref 40–60)
HDLC SERPL: 1.5 {RATIO} (ref 0–5)
HGB BLD-MCNC: 12.5 G/DL (ref 12–16)
LDLC SERPL CALC-MCNC: 35.4 MG/DL (ref 0–100)
LIPID PROFILE,FLP: ABNORMAL
LYMPHOCYTES # BLD: 1.5 K/UL (ref 0.9–3.6)
LYMPHOCYTES NFR BLD: 30 % (ref 21–52)
MCH RBC QN AUTO: 31.8 PG (ref 24–34)
MCHC RBC AUTO-ENTMCNC: 33.1 G/DL (ref 31–37)
MCV RBC AUTO: 96.2 FL (ref 74–97)
MONOCYTES # BLD: 0.4 K/UL (ref 0.05–1.2)
MONOCYTES NFR BLD: 8 % (ref 3–10)
NEUTS SEG # BLD: 2.7 K/UL (ref 1.8–8)
NEUTS SEG NFR BLD: 57 % (ref 40–73)
PLATELET # BLD AUTO: 213 K/UL (ref 135–420)
PMV BLD AUTO: 8.8 FL (ref 9.2–11.8)
POTASSIUM SERPL-SCNC: 4.4 MMOL/L (ref 3.5–5.5)
PROT SERPL-MCNC: 6.8 G/DL (ref 6.4–8.2)
RBC # BLD AUTO: 3.93 M/UL (ref 4.2–5.3)
SODIUM SERPL-SCNC: 143 MMOL/L (ref 136–145)
TRIGL SERPL-MCNC: 48 MG/DL (ref ?–150)
TSH SERPL DL<=0.05 MIU/L-ACNC: 1.94 UIU/ML (ref 0.36–3.74)
VLDLC SERPL CALC-MCNC: 9.6 MG/DL
WBC # BLD AUTO: 4.8 K/UL (ref 4.6–13.2)

## 2021-02-05 PROCEDURE — 85025 COMPLETE CBC W/AUTO DIFF WBC: CPT

## 2021-02-05 PROCEDURE — 80061 LIPID PANEL: CPT

## 2021-02-05 PROCEDURE — 83036 HEMOGLOBIN GLYCOSYLATED A1C: CPT

## 2021-02-05 PROCEDURE — 80053 COMPREHEN METABOLIC PANEL: CPT

## 2021-02-05 PROCEDURE — 84443 ASSAY THYROID STIM HORMONE: CPT

## 2021-02-08 LAB
FAX TO INFO,FAXT: NORMAL
FAX TO NUMBER,FAXN: NORMAL

## 2021-03-23 NOTE — TELEPHONE ENCOUNTER
Per Dr. YAÑEZ [facility], patient did not need an appointment, ok to order injection. Order placed and signed by Dr. YAÑEZ [facility], called patient and informed Bilateral (left and right) were ordered. No further action required at this time. [Initial] : an initial consultation for

## 2021-08-25 ENCOUNTER — HOSPITAL ENCOUNTER (OUTPATIENT)
Dept: LAB | Age: 51
Discharge: HOME | End: 2021-08-25
Payer: COMMERCIAL

## 2021-08-25 LAB
ALBUMIN SERPL-MCNC: 3.8 G/DL (ref 3.4–5)
ALBUMIN/GLOB SERPL: 1.2 {RATIO} (ref 0.8–1.7)
ALP SERPL-CCNC: 68 U/L (ref 45–117)
ALT SERPL-CCNC: 29 U/L (ref 13–56)
ANION GAP SERPL CALC-SCNC: 6 MMOL/L (ref 3–18)
AST SERPL-CCNC: 19 U/L (ref 10–38)
BASOPHILS # BLD: 0 K/UL (ref 0–0.1)
BASOPHILS NFR BLD: 0 % (ref 0–2)
BILIRUB SERPL-MCNC: 0.3 MG/DL (ref 0.2–1)
BUN SERPL-MCNC: 19 MG/DL (ref 7–18)
BUN/CREAT SERPL: 22 (ref 12–20)
CALCIUM SERPL-MCNC: 8.9 MG/DL (ref 8.5–10.1)
CHLORIDE SERPL-SCNC: 107 MMOL/L (ref 100–111)
CHOLEST SERPL-MCNC: 245 MG/DL
CK SERPL-CCNC: 61 U/L (ref 26–192)
CO2 SERPL-SCNC: 29 MMOL/L (ref 21–32)
CREAT SERPL-MCNC: 0.88 MG/DL (ref 0.6–1.3)
DIFFERENTIAL METHOD BLD: ABNORMAL
EOSINOPHIL # BLD: 0.2 K/UL (ref 0–0.4)
EOSINOPHIL NFR BLD: 3 % (ref 0–5)
ERYTHROCYTE [DISTWIDTH] IN BLOOD BY AUTOMATED COUNT: 12.5 % (ref 11.6–14.5)
EST. AVERAGE GLUCOSE BLD GHB EST-MCNC: 94 MG/DL
GLOBULIN SER CALC-MCNC: 3.1 G/DL (ref 2–4)
GLUCOSE SERPL-MCNC: 95 MG/DL (ref 74–99)
HBA1C MFR BLD: 4.9 % (ref 4.2–5.6)
HCT VFR BLD AUTO: 37.8 % (ref 35–45)
HDLC SERPL-MCNC: 99 MG/DL (ref 40–60)
HDLC SERPL: 2.5 {RATIO} (ref 0–5)
HGB BLD-MCNC: 12.7 G/DL (ref 12–16)
LDLC SERPL CALC-MCNC: 129.4 MG/DL (ref 0–100)
LIPID PROFILE,FLP: ABNORMAL
LYMPHOCYTES # BLD: 1.5 K/UL (ref 0.9–3.6)
LYMPHOCYTES NFR BLD: 18 % (ref 21–52)
MCH RBC QN AUTO: 31.1 PG (ref 24–34)
MCHC RBC AUTO-ENTMCNC: 33.6 G/DL (ref 31–37)
MCV RBC AUTO: 92.6 FL (ref 78–100)
MONOCYTES # BLD: 0.6 K/UL (ref 0.05–1.2)
MONOCYTES NFR BLD: 7 % (ref 3–10)
NEUTS SEG # BLD: 5.7 K/UL (ref 1.8–8)
NEUTS SEG NFR BLD: 71 % (ref 40–73)
PLATELET # BLD AUTO: 225 K/UL (ref 135–420)
PMV BLD AUTO: 8.9 FL (ref 9.2–11.8)
POTASSIUM SERPL-SCNC: 4.5 MMOL/L (ref 3.5–5.5)
PROT SERPL-MCNC: 6.9 G/DL (ref 6.4–8.2)
RBC # BLD AUTO: 4.08 M/UL (ref 4.2–5.3)
SODIUM SERPL-SCNC: 142 MMOL/L (ref 136–145)
TRIGL SERPL-MCNC: 83 MG/DL (ref ?–150)
VLDLC SERPL CALC-MCNC: 16.6 MG/DL
WBC # BLD AUTO: 8 K/UL (ref 4.6–13.2)

## 2021-08-25 PROCEDURE — 83036 HEMOGLOBIN GLYCOSYLATED A1C: CPT

## 2021-08-25 PROCEDURE — 80061 LIPID PANEL: CPT

## 2021-08-25 PROCEDURE — 85025 COMPLETE CBC W/AUTO DIFF WBC: CPT

## 2021-08-25 PROCEDURE — 82550 ASSAY OF CK (CPK): CPT

## 2021-08-25 PROCEDURE — 80053 COMPREHEN METABOLIC PANEL: CPT

## 2021-08-26 LAB
FAX TO INFO,FAXT: NORMAL
FAX TO NUMBER,FAXN: NORMAL

## 2021-10-26 ENCOUNTER — APPOINTMENT (OUTPATIENT)
Dept: GENERAL RADIOLOGY | Age: 51
End: 2021-10-26
Attending: EMERGENCY MEDICINE
Payer: COMMERCIAL

## 2021-10-26 ENCOUNTER — HOSPITAL ENCOUNTER (EMERGENCY)
Age: 51
Discharge: HOME | End: 2021-10-26
Attending: EMERGENCY MEDICINE
Payer: COMMERCIAL

## 2021-10-26 VITALS
HEIGHT: 64 IN | OXYGEN SATURATION: 100 % | BODY MASS INDEX: 30.73 KG/M2 | HEART RATE: 98 BPM | SYSTOLIC BLOOD PRESSURE: 162 MMHG | DIASTOLIC BLOOD PRESSURE: 91 MMHG | WEIGHT: 180 LBS | TEMPERATURE: 98 F | RESPIRATION RATE: 18 BRPM

## 2021-10-26 DIAGNOSIS — M54.2 NECK PAIN: Primary | ICD-10-CM

## 2021-10-26 PROCEDURE — 99284 EMERGENCY DEPT VISIT MOD MDM: CPT

## 2021-10-26 PROCEDURE — 96374 THER/PROPH/DIAG INJ IV PUSH: CPT

## 2021-10-26 PROCEDURE — 72040 X-RAY EXAM NECK SPINE 2-3 VW: CPT

## 2021-10-26 PROCEDURE — 74011250637 HC RX REV CODE- 250/637: Performed by: EMERGENCY MEDICINE

## 2021-10-26 PROCEDURE — 74011000250 HC RX REV CODE- 250: Performed by: EMERGENCY MEDICINE

## 2021-10-26 PROCEDURE — 74011250636 HC RX REV CODE- 250/636: Performed by: EMERGENCY MEDICINE

## 2021-10-26 PROCEDURE — 96375 TX/PRO/DX INJ NEW DRUG ADDON: CPT

## 2021-10-26 RX ORDER — FAMOTIDINE 10 MG/ML
20 INJECTION INTRAVENOUS
Status: COMPLETED | OUTPATIENT
Start: 2021-10-26 | End: 2021-10-26

## 2021-10-26 RX ORDER — DEXAMETHASONE SODIUM PHOSPHATE 10 MG/ML
10 INJECTION INTRAMUSCULAR; INTRAVENOUS
Status: COMPLETED | OUTPATIENT
Start: 2021-10-26 | End: 2021-10-26

## 2021-10-26 RX ORDER — CARISOPRODOL 250 MG/1
250 TABLET ORAL 4 TIMES DAILY
Qty: 20 TABLET | Refills: 0 | Status: SHIPPED | OUTPATIENT
Start: 2021-10-26 | End: 2021-10-26 | Stop reason: SDUPTHER

## 2021-10-26 RX ORDER — CARISOPRODOL 250 MG/1
250 TABLET ORAL 4 TIMES DAILY
Qty: 20 TABLET | Refills: 0 | Status: SHIPPED | OUTPATIENT
Start: 2021-10-26

## 2021-10-26 RX ORDER — MORPHINE SULFATE 4 MG/ML
4 INJECTION INTRAVENOUS
Status: COMPLETED | OUTPATIENT
Start: 2021-10-26 | End: 2021-10-26

## 2021-10-26 RX ORDER — MORPHINE SULFATE 10 MG/ML
6 INJECTION, SOLUTION INTRAMUSCULAR; INTRAVENOUS
Status: COMPLETED | OUTPATIENT
Start: 2021-10-26 | End: 2021-10-26

## 2021-10-26 RX ORDER — SUCRALFATE 1 G/1
1 TABLET ORAL ONCE
Status: COMPLETED | OUTPATIENT
Start: 2021-10-26 | End: 2021-10-26

## 2021-10-26 RX ORDER — DIAZEPAM 10 MG/2ML
5 INJECTION INTRAMUSCULAR ONCE
Status: COMPLETED | OUTPATIENT
Start: 2021-10-26 | End: 2021-10-26

## 2021-10-26 RX ORDER — ONDANSETRON 2 MG/ML
4 INJECTION INTRAMUSCULAR; INTRAVENOUS
Status: COMPLETED | OUTPATIENT
Start: 2021-10-26 | End: 2021-10-26

## 2021-10-26 RX ORDER — KETOROLAC TROMETHAMINE 30 MG/ML
30 INJECTION, SOLUTION INTRAMUSCULAR; INTRAVENOUS
Status: COMPLETED | OUTPATIENT
Start: 2021-10-26 | End: 2021-10-26

## 2021-10-26 RX ADMIN — ONDANSETRON 4 MG: 2 INJECTION INTRAMUSCULAR; INTRAVENOUS at 08:38

## 2021-10-26 RX ADMIN — KETOROLAC TROMETHAMINE 30 MG: 30 INJECTION, SOLUTION INTRAMUSCULAR at 08:03

## 2021-10-26 RX ADMIN — FAMOTIDINE 20 MG: 10 INJECTION, SOLUTION INTRAVENOUS at 09:13

## 2021-10-26 RX ADMIN — SODIUM CHLORIDE 1000 ML: 900 INJECTION, SOLUTION INTRAVENOUS at 08:03

## 2021-10-26 RX ADMIN — DIAZEPAM 5 MG: 5 INJECTION, SOLUTION INTRAMUSCULAR; INTRAVENOUS at 08:03

## 2021-10-26 RX ADMIN — DEXAMETHASONE SODIUM PHOSPHATE 10 MG: 10 INJECTION INTRAMUSCULAR; INTRAVENOUS at 09:13

## 2021-10-26 RX ADMIN — MORPHINE SULFATE 6 MG: 10 INJECTION INTRAVENOUS at 08:38

## 2021-10-26 RX ADMIN — SUCRALFATE 1 G: 1 TABLET ORAL at 10:22

## 2021-10-26 RX ADMIN — MORPHINE SULFATE 4 MG: 4 INJECTION INTRAVENOUS at 09:50

## 2021-10-26 RX ADMIN — LIDOCAINE HYDROCHLORIDE 40 ML: 20 SOLUTION ORAL; TOPICAL at 09:19

## 2021-10-26 NOTE — Clinical Note
[facility] [place]  [facility]  [address]  [facility] [address] [phone]    Work/School Note    Date: 10/26/2021    To Whom It May concern:    Humberto Sequeira was seen and treated today in the emergency room by the following provider(s):  Attending Provider: Clois Cabot, MD.      Humberto Sequeira is excused from work/school on 10/26/21 and 10/27/21. She is medically clear to return to work/school on 10/28/2021.        Sincerely,          Sampson Whitfield MD

## 2021-10-26 NOTE — ED TRIAGE NOTES
Pt ambulatory to triage with complaints of neck pain that began Friday morning. Pt states that she has seen her chiropractor, tried Vicodin, and tylenol with no relief. PT has arthritis in her neck that is known. No known recent injury. Denies chest pain, denies shortness of breath.
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
## 2021-10-27 ENCOUNTER — PATIENT OUTREACH (OUTPATIENT)
Dept: OTHER | Age: 51
End: 2021-10-27

## 2021-10-27 RX ORDER — OMEPRAZOLE 40 MG/1
40 CAPSULE, DELAYED RELEASE ORAL DAILY
COMMUNITY

## 2021-10-27 RX ORDER — ASPIRIN 81 MG/1
TABLET ORAL DAILY
COMMUNITY

## 2021-10-27 RX ORDER — MELATONIN 2.5MG/10ML
LIQUID (ML) ORAL DAILY
COMMUNITY

## 2021-10-27 RX ORDER — ZINC GLUCONATE 10 MG
LOZENGE ORAL
COMMUNITY

## 2021-10-27 RX ORDER — EZETIMIBE 10 MG/1
TABLET ORAL
COMMUNITY

## 2021-10-27 RX ORDER — LANOLIN ALCOHOL/MO/W.PET/CERES
1000 CREAM (GRAM) TOPICAL DAILY
COMMUNITY
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
## 2021-10-28 ENCOUNTER — TRANSCRIBE ORDER (OUTPATIENT)
Dept: SCHEDULING | Age: 51
End: 2021-10-28

## 2021-10-28 DIAGNOSIS — M54.2 CERVICALGIA: Primary | ICD-10-CM

## 2021-11-03 ENCOUNTER — PATIENT OUTREACH (OUTPATIENT)
Dept: OTHER | Age: 51
End: 2021-11-03

## 2021-11-03 NOTE — PROGRESS NOTES
[facility]PRP Outreach    Call placed to patient, no answer. Voicemail left to return call. Pt was seen at THE [facility] ER 10/26/21. Diagnosis: neck pain    Goals       Attends follow-up appointments as directed. Ortho - 10/28/21  PCP - TBD  Foot surgery - 11/10/21  RTW - TBD     11/3/21 Call placed to patient, no answer. Voicemail left to return call.   Care coordination related to neck pain (pt-stated)       Knowledge and adherence of prescribed medication (ie. action, side effects, missed dose, etc.). Taking prescribed meds         Supportive resources in place to maintain patient in the community (ie. Home Health, DME equipment, refer to, medication assistant plan, etc.)       [facility] - # [phone] 24/7  Nurse Access line 24/7  Be Well  [address] [phone]  HR Service Now - [facility] Workday  IT - [phone]  [facility] Help - 1- [phone]  Associate Services for advice and direction, by calling [phone] and pressing 1 or Absence. [email or web address]  [employer] [facility] - [phone] Go to 5[employer] on the Internet or your mobile device and enter the password [email or web address] to access resources, educational information, and self-service options.   Understands red flags post discharge. You have new or worsening numbness in your arms, buttocks or legs. You have new or worsening weakness in your arms or legs. (This could make it hard to  stand up.)  You lose control of your bladder or bowels. Your neck pain is getting worse. You are not getting better after 1 week.   You do not get better as expected          CM will f/u in 1 week

## 2021-11-04 ENCOUNTER — HOSPITAL ENCOUNTER (OUTPATIENT)
Dept: MRI IMAGING | Age: 51
Discharge: HOME | End: 2021-11-04
Attending: ORTHOPAEDIC SURGERY
Payer: COMMERCIAL

## 2021-11-04 DIAGNOSIS — M54.2 CERVICALGIA: ICD-10-CM

## 2021-11-04 PROCEDURE — 72141 MRI NECK SPINE W/O DYE: CPT

## 2021-11-11 ENCOUNTER — PATIENT OUTREACH (OUTPATIENT)
Dept: OTHER | Age: 51
End: 2021-11-11

## 2021-11-11 NOTE — PROGRESS NOTES
HPRP Outreach    Call placed to patient, no answer. Voicemail left to return call. Pt was seen at THE [facility] ER 10/26/21. Diagnosis: neck pain    Goals       Attends follow-up appointments as directed. Ortho - 10/28/21  PCP - TBD  Foot surgery - 11/10/21  RTW - TBD     11/3/21 Call placed to patient, no answer. Voicemail left to return call. 11/11/21 Call placed to patient, no answer. Voicemail left to return call.   Care coordination related to neck pain (pt-stated)       Knowledge and adherence of prescribed medication (ie. action, side effects, missed dose, etc.). Taking prescribed meds         Supportive resources in place to maintain patient in the community (ie. Home Health, DME equipment, refer to, medication assistant plan, etc.)       [facility] - # [phone] 24/7  Nurse Access line 24/7  Be Well  [address] [place] [address] Urgent [facility] [phone]  HR Service Now - [facility] Workday  IT - [phone]  [facility] Help - 1- [phone]  Associate Services for advice and direction, by calling [phone] and pressing 1 or Absence. [email or web address]  [employer] - [phone] Go to [employer] on the Internet or your mobile device and enter the password [email or web address] to access resources, educational information, and self-service options.   Understands red flags post discharge. You have new or worsening numbness in your arms, buttocks or legs. You have new or worsening weakness in your arms or legs. (This could make it hard to  stand up.)  You lose control of your bladder or bowels. Your neck pain is getting worse. You are not getting better after 1 week.   You do not get better as expected          CM will f/u 2 weeks

## 2021-11-22 ENCOUNTER — HOSPITAL ENCOUNTER (OUTPATIENT)
Dept: PHYSICAL THERAPY | Age: 51
Discharge: HOME | End: 2021-11-22
Payer: COMMERCIAL

## 2021-11-22 PROCEDURE — 97162 PT EVAL MOD COMPLEX 30 MIN: CPT

## 2021-11-22 PROCEDURE — 97140 MANUAL THERAPY 1/> REGIONS: CPT

## 2021-11-22 PROCEDURE — 97110 THERAPEUTIC EXERCISES: CPT

## 2021-11-22 PROCEDURE — 97535 SELF CARE MNGMENT TRAINING: CPT
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
## 2021-11-24 ENCOUNTER — HOSPITAL ENCOUNTER (OUTPATIENT)
Dept: PHYSICAL THERAPY | Age: 51
Discharge: HOME | End: 2021-11-24
Payer: COMMERCIAL

## 2021-11-24 PROCEDURE — 97110 THERAPEUTIC EXERCISES: CPT

## 2021-11-24 PROCEDURE — 97140 MANUAL THERAPY 1/> REGIONS: CPT

## 2021-11-24 PROCEDURE — 97530 THERAPEUTIC ACTIVITIES: CPT

## 2021-11-24 NOTE — PROGRESS NOTES
PT DAILY TREATMENT NOTE    Patient Name: Clau Dolan  Date:2021  : 1970  [x]  Patient  Verified  Payor: MEDICAL [facility] / Plan: [facility] MEDICAL [place] [address] [facility] / Product Type: Commerical /    In time:330  Out time:410  Total Treatment Time (min): 40  Total Timed Codes (min): 40  1:1 Treatment Time (MC/BCBS only): 40   Visit #: 2 of 12    Treatment Dx: Cervicalgia [M54.2]    SUBJECTIVE  Pain Level (0-10 scale): 5/10  Any medication changes, allergies to medications, adverse drug reactions, diagnosis change, or new procedure performed?: [x] No    [] Yes (see summary sheet for update)  Subjective functional status/changes:   [] No changes reported  Pt reports that she was working all day so her neck is a little tight now. OBJECTIVE    15  min Therapeutic Exercise:  [x] See flow sheet :   Rationale: increase ROM, increase strength, improve coordination, improve balance and increase proprioception to improve the patients ability to restore PLOF    15 min Neuromuscular Re-education:  [x]  See flow sheet :   Rationale: increase ROM, increase strength, improve coordination, improve balance and increase proprioception  to improve the patients ability to activate scap retracors without compensation    10 min Manual Therapy:  UT stretch,  Left UT trigger point release, sub occcipital release, cervical traction   Rationale: decrease pain, increase ROM, increase tissue extensibility, decrease edema , correct positional vertigo, decrease trigger points and increase postural awareness to improve the patients ability to restore PLOF  The manual therapy interventions were performed at a separate and distinct time from the therapeutic activities interventions.         With   [x] TE   [] TA   [x] neuro   [] other: Patient Education: [x] Review HEP    [x] Progressed/Changed HEP based on:   [x] positioning   [x] body mechanics   [] transfers   [] heat/ice application    [] other: Pain Level (0-10 scale) post treatment: 3/10    ASSESSMENT/Changes in Function: Patient tolerated treatment session well today. Patient had no complaints with addition of scap retraction with ER to exercise program to accomplish improved scapular strength. Pt cont to need manual release for pain relief and headache relief. Pt cont to display increased upper trap tightness. .  Patient continues to make steady progress toward goals and would benefit from continued skilled PT intervention to address remaining deficits outlined in goals below. Patient will continue to benefit from skilled PT services to modify and progress therapeutic interventions, address functional mobility deficits, address ROM deficits, address strength deficits, analyze and address soft tissue restrictions, analyze and cue movement patterns, analyze and modify body mechanics/ergonomics, assess and modify postural abnormalities and instruct in home and community integration to attain remaining goals. [x]  See Plan of Care  []  See progress note/recertification  []  See Discharge Summary         Progress towards goals / Updated goals:  Short Term Goals: To be accomplished in 3 weeks:  1. Patient will be independent and compliant with HEP to progress toward goals and restore functional mobility. Eval Status: issued at [facility]  Current:      2. Patient will improve pain in neck and left UE  to 5/10  to improve activity tolerance and restore prior level of function. Eval Status: 10/10 at worst   Current:  6/10 at worst 11/24/21     3. Pt will have 5/5 UE and scapular strength to return to goals of performing ADLs.   Eval Status:   Shoulder Left (1-5) Right (1-5)   Shoulder Flexion 4 5   Shoulder ABD 4 5   Shoulder IR 4+ 5   Shoulder ER 4 5            Scapular Left (1-5) Right (1-5)   Lower Trap 4- 4+   Middle Trap 3+ 4   Rhomboids 3 4                                Elbow/Wrist Left (1-5) Right (1-5)   Elbow Flexion 4+ 5   Elbow Extension 4+ 5 Wrist FLexion 4 5   Wrist Extnesion 4+ 5                       Current:                                     4. Pt will have painfree cervical AROM, with bilat side bend to at least 35 degrees, to aid in functional mechanics for ambulation/ADLs. Eval Status:   Cervical Left Right   Flexion 31     Extension 45     Side bend 30 21   Rotation 67 65    Current:      Long Term Goals: To be accomplished in 6 weeks:  1. Patient will improve FOTO score by 6 points to improve functional tolerance for performing ADLs and work duties. Eval Status: FOTO 62  FOTO score = an established functional score where 100 = no disability  Current:      2. Pt will have painfree cervical AROM, with bilat side bend to at least 45 degrees, to aid in functional mechanics for ambulation/ADLs. Eval Status:   Cervical Left Right   Flexion 31     Extension 45     Side bend 30 21   Rotation 67 65   Current:       3. Pt will have 5/5 cervical strength to return to goals of tolerating sustained postures. Eval Status:   Cervical Left (1-5) Right (1-5)   Flexion 4     Extension 4+     Side Bend 4- 4-   Rotation 4 4-   Comments: Pain with all cervical MMT  Current:      4. Patient will improve pain in neck and left UE to 1-2/10 at worst to improve activity tolerance and restore prior level of function.   Eval Status: 10/10 at worst  Current:      PLAN  [x]  Upgrade activities as tolerated     [x]  Continue plan of care  [x]  Update interventions per flow sheet       []  Discharge due to:_  []  Other:_      Suha Walker, PT 11/24/2021  9:05 AM    Future Appointments   Date Time Provider Poncho Dias   11/24/2021  3:30 PM Yaya Vuong, PT [facility]   11/29/2021  3:30 PM Abeba Mulligan, [facility]   12/3/2021  2:45 PM Minesh Rivas [facility]   12/8/2021 12:30 PM Noemi Ellison, PT [facility]   12/10/2021  3:30 PM Morales Mak, [facility]

## 2021-11-29 ENCOUNTER — HOSPITAL ENCOUNTER (OUTPATIENT)
Dept: PHYSICAL THERAPY | Age: 51
Discharge: HOME | End: 2021-11-29
Payer: COMMERCIAL

## 2021-11-29 PROCEDURE — 97110 THERAPEUTIC EXERCISES: CPT

## 2021-11-29 PROCEDURE — 97112 NEUROMUSCULAR REEDUCATION: CPT

## 2021-11-29 PROCEDURE — 97140 MANUAL THERAPY 1/> REGIONS: CPT

## 2021-11-29 NOTE — PROGRESS NOTES
PT DAILY TREATMENT NOTE    Patient Name: Aisha Meek  Date:2021  : 1970  [x]  Patient  Verified  Payor: MEDICAL [facility] / Plan: [facility] MEDICAL [employer] [address] / Product Type: Commerical /    In time:330  Out time:413  Total Treatment Time (min): 43  Total Timed Codes (min): 43  1:1 Treatment Time (MC/BCBS only): 43   Visit #: 3 of 12    Treatment Dx: Cervicalgia [M54.2]    SUBJECTIVE  Pain Level (0-10 scale): 4/10  Any medication changes, allergies to medications, adverse drug reactions, diagnosis change, or new procedure performed?: [x] No    [] Yes (see summary sheet for update)  Subjective functional status/changes:   [] No changes reported  Pt reported that its Monday and she has increased stiffness. OBJECTIVE      15 min Therapeutic Exercise:  [x] See flow sheet :   Rationale: increase ROM, increase strength, improve coordination, improve balance and increase proprioception to improve the patients ability to restore PLOF    15 min Neuromuscular Re-education:  [x]  See flow sheet :   Rationale: increase ROM, increase strength, improve coordination, improve balance and increase proprioception  to improve the patients ability to activate scap retraction without compensation    13 min Manual Therapy:  Cervical traction, MLD to neck and face at temples and behind ears, Upper trap stretch, velasquez occipital release. Rationale: decrease pain, increase ROM, increase tissue extensibility, decrease edema , correct positional vertigo, decrease trigger points and increase postural awareness to improve the patients ability to restore PLOF  The manual therapy interventions were performed at a separate and distinct time from the therapeutic activities interventions.           With   [x] TE   [x] TA   [x] neuro   [] other: Patient Education: [x] Review HEP    [x] Progressed/Changed HEP based on:   [x] positioning   [x] body mechanics   [x] transfers   [] heat/ice application [] other:    Pain Level (0-10 scale) post treatment: 3/10    ASSESSMENT/Changes in Function: Patient tolerated treatment session well today. Patient had no complaints with addition of supine horizontal abduction, AROM cervical to exercise program to accomplish improved flexibility and scapular strength. Pt cont to present with increased tightness. Pt educated on incorporation of stretches to daily work to decrease pain. Patient continues to make steady progress toward goals and would benefit from continued skilled PT intervention to address remaining deficits outlined in goals below. Patient will continue to benefit from skilled PT services to modify and progress therapeutic interventions, address functional mobility deficits, address ROM deficits, address strength deficits, analyze and address soft tissue restrictions, analyze and cue movement patterns, analyze and modify body mechanics/ergonomics, assess and modify postural abnormalities and instruct in home and community integration to attain remaining goals. [x]  See Plan of Care  []  See progress note/recertification  []  See Discharge Summary         Progress towards goals / Updated goals:  Short Term Goals: To be accomplished in 3 weeks:  1. Patient will be independent and compliant with HEP to progress toward goals and restore functional mobility. Eval Status: issued at eval  Current: Pt reported single day compliance 11/29/21      2. Patient will improve pain in neck and left UE  to 5/10  to improve activity tolerance and restore prior level of function. Eval Status: 10/10 at worst   Current:  6/10 at worst 11/24/21      3. Pt will have 5/5 UE and scapular strength to return to goals of performing ADLs.   Eval Status:   Shoulder Left (1-5) Right (1-5)   Shoulder Flexion 4 5   Shoulder ABD 4 5   Shoulder IR 4+ 5   Shoulder ER 4 5            Scapular Left (1-5) Right (1-5)   Lower Trap 4- 4+   Middle Trap 3+ 4   Rhomboids 3 4                              Elbow/Wrist Left (1-5) Right (1-5)   Elbow Flexion 4+ 5   Elbow Extension 4+ 5   Wrist FLexion 4 5   Wrist Extnesion 4+ 5                       Current:                                     4. Pt will have painfree cervical AROM, with bilat side bend to at least 35 degrees, to aid in functional mechanics for ambulation/ADLs. Eval Status:   Cervical Left Right   Flexion 31     Extension 45     Side bend 30 21   Rotation 67 65    Current:      Long Term Goals: To be accomplished in 6 weeks:  1. Patient will improve FOTO score by 6 points to improve functional tolerance for performing ADLs and work duties. Eval Status: FOTO 62  FOTO score = an established functional score where 100 = no disability  Current:      2. Pt will have painfree cervical AROM, with bilat side bend to at least 45 degrees, to aid in functional mechanics for ambulation/ADLs. Eval Status:   Cervical Left Right   Flexion 31     Extension 45     Side bend 30 21   Rotation 67 65   Current:       3. Pt will have 5/5 cervical strength to return to goals of tolerating sustained postures. Eval Status:   Cervical Left (1-5) Right (1-5)   Flexion 4     Extension 4+     Side Bend 4- 4-   Rotation 4 4-   Comments: Pain with all cervical MMT  Current:      4. Patient will improve pain in neck and left UE to 1-2/10 at worst to improve activity tolerance and restore prior level of function.   Eval Status: 10/10 at worst  Current:     PLAN  []  Upgrade activities as tolerated     [x]  Continue plan of care  []  Update interventions per flow sheet       []  Discharge due to:_  []  Other:_      Virgen Rivas, PT 11/29/2021  2:35 PM    Future Appointments   Date Time Provider Poncho Dias   11/29/2021  3:30 PM Ada Brock, PT [facility]   [facility]   12/8/2021 12:30 PM Wilbert Ellison, PT [facility]   12/10/2021  3:30 PM Madi Hsieh, PT [facility]

## 2021-12-01 ENCOUNTER — APPOINTMENT (OUTPATIENT)
Dept: PHYSICAL THERAPY | Age: 51
End: 2021-12-01
Payer: COMMERCIAL

## 2021-12-03 ENCOUNTER — PATIENT OUTREACH (OUTPATIENT)
Dept: OTHER | Age: 51
End: 2021-12-03

## 2021-12-08 ENCOUNTER — HOSPITAL ENCOUNTER (OUTPATIENT)
Dept: PHYSICAL THERAPY | Age: 51
Discharge: HOME | End: 2021-12-08
Payer: COMMERCIAL

## 2021-12-08 PROCEDURE — 97110 THERAPEUTIC EXERCISES: CPT

## 2021-12-08 PROCEDURE — 97112 NEUROMUSCULAR REEDUCATION: CPT

## 2021-12-08 NOTE — PROGRESS NOTES
PT DAILY TREATMENT NOTE    Patient Name: Nik Hernandez  Date:2021  : 1970  [x]  Patient  Verified  Payor: MEDICAL [facility] / Plan: [facility] MEDICAL [employer] [address] / Product Type: Commerical /    In time:1230  Out time:1300  Total Treatment Time (min): 30  Total Timed Codes (min): 30  1:1 Treatment Time (MC/BCBS only): 30   Visit #: 4 of 12    Treatment Dx: Cervicalgia [M54.2]    SUBJECTIVE  Pain Level (0-10 scale): 7  Any medication changes, allergies to medications, adverse drug reactions, diagnosis change, or new procedure performed?: [x] No    [] Yes (see summary sheet for update)  Subjective functional status/changes:   [] No changes reported  Pt reports there is a lot of stress at work right now and she thinks that is the primary cause of her increased pain. OBJECTIVE    15 min Therapeutic Exercise:  [x] See flow sheet :   Rationale: increase ROM and increase strength to improve the patients ability to restore PLOF       15 min Neuromuscular Re-education:  [x]  See flow sheet :   Rationale: improve coordination and increase proprioception  to improve the patients ability to activate scapular stabilizers without compensation          With   [x] TE   [] TA   [x] neuro   [] other: Patient Education: [x] Review HEP    [] Progressed/Changed HEP based on:   [] positioning   [] body mechanics   [] transfers   [] heat/ice application    [] other:      Other Objective/Functional Measures: NA     Pain Level (0-10 scale) post treatment: 4    ASSESSMENT/Changes in Function: Patient tolerated treatment session well today. Patient had no complaints with addition of wall clocks and SA pushups at wall to exercise program to accomplish improve scapular stability and strength.  Pt requires max cueing during session for proper form with exercises Patient continues to make good progress toward goals and would benefit from continued skilled PT intervention to address remaining deficits outlined in goals below. Patient will continue to benefit from skilled PT services to modify and progress therapeutic interventions, address functional mobility deficits, address ROM deficits, address strength deficits, analyze and address soft tissue restrictions, analyze and cue movement patterns, analyze and modify body mechanics/ergonomics and assess and modify postural abnormalities to attain remaining goals. [x]  See Plan of Care  []  See progress note/recertification  []  See Discharge Summary         Progress towards goals / Updated goals:  Short Term Goals: To be accomplished in 3 weeks:  1. Patient will be independent and compliant with HEP to progress toward goals and restore functional mobility. Eval Status: issued at eval  Current: Pt reports she is attempting to do the stretches while at work bc they help to alleviate the pain and tension - the rest she gets to once she gets home 12/8/21     2. Patient will improve pain in neck and left UE  to 5/10  to improve activity tolerance and restore prior level of function. Eval Status: 10/10 at worst   Current:  6/10 at worst 11/24/21      3. Pt will have 5/5 UE and scapular strength to return to goals of performing ADLs. Eval Status:   Shoulder Left (1-5) Right (1-5)   Shoulder Flexion 4 5   Shoulder ABD 4 5   Shoulder IR 4+ 5   Shoulder ER 4 5            Scapular Left (1-5) Right (1-5)   Lower Trap 4- 4+   Middle Trap 3+ 4   Rhomboids 3 4                                Elbow/Wrist Left (1-5) Right (1-5)   Elbow Flexion 4+ 5   Elbow Extension 4+ 5   Wrist FLexion 4 5   Wrist Extnesion 4+ 5                       Current:                                     4. Pt will have painfree cervical AROM, with bilat side bend to at least 35 degrees, to aid in functional mechanics for ambulation/ADLs.   Eval Status:   Cervical Left Right   Flexion 31     Extension 45     Side bend 30 21   Rotation 67 65    Current:      Long Term Goals: To be accomplished in 6 weeks: 1. Patient will improve FOTO score by 6 points to improve functional tolerance for performing ADLs and work duties. Eval Status: FOTO 62  FOTO score = an established functional score where 100 = no disability  Current:      2. Pt will have painfree cervical AROM, with bilat side bend to at least 45 degrees, to aid in functional mechanics for ambulation/ADLs. Eval Status:   Cervical Left Right   Flexion 31     Extension 45     Side bend 30 21   Rotation 67 65   Current:       3. Pt will have 5/5 cervical strength to return to goals of tolerating sustained postures. Eval Status:   Cervical Left (1-5) Right (1-5)   Flexion 4     Extension 4+     Side Bend 4- 4-   Rotation 4 4-   Comments: Pain with all cervical MMT  Current:      4. Patient will improve pain in neck and left UE to 1-2/10 at worst to improve activity tolerance and restore prior level of function.   Eval Status: 10/10 at worst  Current:            PLAN  [x]  Upgrade activities as tolerated     [x]  Continue plan of care  [x]  Update interventions per flow sheet       []  Discharge due to:_  []  Other:_      Ethan Saavedra, PT 12/8/2021  12:33 PM    Future Appointments   Date Time Provider Poncho Dias   12/10/2021  3:30 PM Yves Jones, PT [facility]

## 2021-12-10 ENCOUNTER — HOSPITAL ENCOUNTER (OUTPATIENT)
Dept: PHYSICAL THERAPY | Age: 51
Discharge: HOME | End: 2021-12-10
Payer: COMMERCIAL

## 2021-12-10 PROCEDURE — 97530 THERAPEUTIC ACTIVITIES: CPT

## 2021-12-10 PROCEDURE — 97016 VASOPNEUMATIC DEVICE THERAPY: CPT

## 2021-12-10 PROCEDURE — 97110 THERAPEUTIC EXERCISES: CPT

## 2021-12-10 PROCEDURE — 97112 NEUROMUSCULAR REEDUCATION: CPT

## 2021-12-10 PROCEDURE — 97140 MANUAL THERAPY 1/> REGIONS: CPT

## 2021-12-10 NOTE — PROGRESS NOTES
PT DAILY TREATMENT NOTE    Patient Name: Yuval Barreto  Date:12/10/2021  : 1970  [x]  Patient  Verified  Payor: [employer] [place] / Plan: [facility] MEDICAL [place] [address] / Product Type: Commerical /    In time:330  Out time:423  Total Treatment Time (min): 53  Total Timed Codes (min): 53  1:1 Treatment Time (MC/BCBS only): 48   Visit #: 5 of 12    Treatment Dx: Cervicalgia [M54.2]    SUBJECTIVE  Pain Level (0-10 scale): 5/10  Any medication changes, allergies to medications, adverse drug reactions, diagnosis change, or new procedure performed?: [x] No    [] Yes (see summary sheet for update)  Subjective functional status/changes:   [] No changes reported  Pt reports cont tightness in neck today. OBJECTIVE    15 min Therapeutic Exercise:  [x] See flow sheet :   Rationale: increase ROM, increase strength, improve coordination, improve balance and increase proprioception to improve the patients ability to restore PLOF    15 min Therapeutic Activity:  [x]  See flow sheet :   Rationale: increase ROM, increase strength, improve coordination, improve balance and increase proprioception  to improve the patients ability to complete transfers and ADLs without external assist     15 min Neuromuscular Re-education:  [x]  See flow sheet :   Rationale: increase ROM, increase strength, improve coordination, improve balance and increase proprioception  to improve the patients ability to activate scap retractors without compensation    8 min Manual Therapy:  Sub occipital release, UT stretch,    Rationale: decrease pain, increase ROM, increase tissue extensibility, decrease edema , decrease trigger points and increase postural awareness to improve the patients ability to restore PLOF  The manual therapy interventions were performed at a separate and distinct time from the therapeutic activities interventions.           With   [x] TE   [x] TA   [x] neuro   [] other: Patient Education: [x] Review HEP    [x] Progressed/Changed HEP based on:   [x] positioning   [x] body mechanics   [x] transfers   [] heat/ice application    [] other:      Other Objective/Functional Measures: FOTO 54     Pain Level (0-10 scale) post treatment: 3/10    ASSESSMENT/Changes in Function: Patient tolerated treatment session well today. Pt educated on prognosis for healing and improvements in flexibility and strength. Patient continues to make steady progress toward goals and would benefit from continued skilled PT intervention to address remaining deficits outlined in goals below. Patient will continue to benefit from skilled PT services to modify and progress therapeutic interventions, address functional mobility deficits, address ROM deficits, address strength deficits, analyze and address soft tissue restrictions, analyze and cue movement patterns, analyze and modify body mechanics/ergonomics, assess and modify postural abnormalities and instruct in home and community integration to attain remaining goals. [x]  See Plan of Care  []  See progress note/recertification  []  See Discharge Summary         Progress towards goals / Updated goals:  Short Term Goals: To be accomplished in 3 weeks:  1. Patient will be independent and compliant with HEP to progress toward goals and restore functional mobility. Eval Status: issued at eval  Current: Pt reports she is attempting to do the stretches while at work bc they help to alleviate the pain and tension - the rest she gets to once she gets home 12/8/21     2. Patient will improve pain in neck and left UE  to 5/10  to improve activity tolerance and restore prior level of function. Eval Status: 10/10 at worst   Current:  6/10 at worst 11/24/21      3. Pt will have 5/5 UE and scapular strength to return to goals of performing ADLs.   Eval Status:   Shoulder Left (1-5) Right (1-5)   Shoulder Flexion 4 5   Shoulder ABD 4 5   Shoulder IR 4+ 5   Shoulder ER 4 5          Scapular Left (1-5) Right (1-5)   Lower Trap 4- 4+   Middle Trap 3+ 4   Rhomboids 3 4                                Elbow/Wrist Left (1-5) Right (1-5)   Elbow Flexion 4+ 5   Elbow Extension 4+ 5   Wrist FLexion 4 5   Wrist Extnesion 4+ 5                       Current:                                     4. Pt will have painfree cervical AROM, with bilat side bend to at least 35 degrees, to aid in functional mechanics for ambulation/ADLs. Eval Status:   Cervical Left Right   Flexion 31     Extension 45     Side bend 30 21   Rotation 67 65    Current:      Long Term Goals: To be accomplished in 6 weeks:  1. Patient will improve FOTO score by 6 points to improve functional tolerance for performing ADLs and work duties. Eval Status: FOTO 62  FOTO score = an established functional score where 100 = no disability  Current: 52 12/10/21     2. Pt will have painfree cervical AROM, with bilat side bend to at least 45 degrees, to aid in functional mechanics for ambulation/ADLs. Eval Status:   Cervical Left Right   Flexion 31     Extension 45     Side bend 30 21   Rotation 67 65   Current:       3. Pt will have 5/5 cervical strength to return to goals of tolerating sustained postures. Eval Status:   Cervical Left (1-5) Right (1-5)   Flexion 4     Extension 4+     Side Bend 4- 4-   Rotation 4 4-   Comments: Pain with all cervical MMT  Current:      4. Patient will improve pain in neck and left UE to 1-2/10 at worst to improve activity tolerance and restore prior level of function.   Eval Status: 10/10 at worst  Current:     PLAN  [x]  Upgrade activities as tolerated     [x]  Continue plan of care  [x]  Update interventions per flow sheet       []  Discharge due to:_  []  Other:_      Huseyin Velázquez, PT 12/10/2021  8:15 AM    Future Appointments   Date Time Provider Poncho Dias   12/10/2021  3:30 PM Leonardo Malone, PT [facility]

## 2021-12-15 ENCOUNTER — HOSPITAL ENCOUNTER (OUTPATIENT)
Dept: PHYSICAL THERAPY | Age: 51
Discharge: HOME | End: 2021-12-15
Payer: COMMERCIAL

## 2021-12-15 PROCEDURE — 97112 NEUROMUSCULAR REEDUCATION: CPT

## 2021-12-15 PROCEDURE — 97110 THERAPEUTIC EXERCISES: CPT

## 2021-12-15 PROCEDURE — 97140 MANUAL THERAPY 1/> REGIONS: CPT

## 2021-12-15 PROCEDURE — 97530 THERAPEUTIC ACTIVITIES: CPT

## 2021-12-15 NOTE — PROGRESS NOTES
PT DAILY TREATMENT NOTE    Patient Name: Lisa Wade  Date:12/15/2021  : 1970  [x]  Patient  Verified  Payor: [employer] [place] / Plan: [facility] MEDICAL [place] [address] / Product Type: Commerical /    In time:11:00 AM  Out time:11:45 AM  Total Treatment Time (min): 45  Visit #: 6 of 12    Treatment Dx: Cervicalgia [M54.2]    SUBJECTIVE  Pain Level (0-10 scale): 3  Any medication changes, allergies to medications, adverse drug reactions, diagnosis change, or new procedure performed?: [x] No    [] Yes (see summary sheet for update)  Subjective functional status/changes:   [] No changes reported  \"I have a massive headache right now. \"    OBJECTIVE      10 min Therapeutic Exercise:  [x] See flow sheet :   Rationale: increase ROM, increase strength and improve coordination to improve the patients ability to increase ease with ADLs    8 min Therapeutic Activity:  [x] See flow sheet : tband rows, ext. Rationale: increase ROM, increase strength and improve coordination to improve the patients ability to increase ease with ADLs     17 min Neuromuscular Re-education:  [x]  See flow sheet :   Rationale: improve coordination, improve balance and increase proprioception  to improve the patients ability to improve upright posture    10 min Manual Therapy: In supine hooklying:  SOR, STM/DTM/TPR to bilateral UT/LS and c/s paraspinals, c/s side glides   Rationale: decrease pain, increase ROM and increase tissue extensibility to improve the patients ability to ease ADL tolerance  The manual therapy interventions were performed at a separate and distinct time from the therapeutic activities interventions. With   [] TE   [] TA   [] neuro   [] other: Patient Education: [x] Review HEP    [] Progressed/Changed HEP based on:   [] positioning   [] body mechanics   [] transfers   [] heat/ice application    [] other:      Other Objective/Functional Measures:    Added activities per flowsheet     Pain Level (0-10 scale) post treatment: 3    ASSESSMENT/Changes in Function:   Patient with poor posture, limited scapular and rib cage mobility. She continues with varying pain levels and continued reports of headache. Added activities per flowsheet void of adverse effect to improve posture and rib cage mobility. Patient will continue to benefit from skilled PT services to modify and progress therapeutic interventions, address functional mobility deficits, address ROM deficits, address strength deficits, analyze and address soft tissue restrictions, analyze and cue movement patterns, analyze and modify body mechanics/ergonomics, assess and modify postural abnormalities and instruct in home and community integration to attain remaining goals. [x]  See Plan of Care  []  See progress note/recertification  []  See Discharge Summary         Progress towards goals / Updated goals:  Short Term Goals: To be accomplished in 3 weeks:  1. Patient will be independent and compliant with HEP to progress toward goals and restore functional mobility. Eval Status: issued at eval  Current: Pt reports she is attempting to do the stretches while at work bc they help to alleviate the pain and tension - the rest she gets to once she gets home 12/8/21     2. Patient will improve pain in neck and left UE  to 5/10  to improve activity tolerance and restore prior level of function. Eval Status: 10/10 at worst   Current:  6/10 at worst 11/24/21      3. Pt will have 5/5 UE and scapular strength to return to goals of performing ADLs.   Eval Status:   Shoulder Left (1-5) Right (1-5)   Shoulder Flexion 4 5   Shoulder ABD 4 5   Shoulder IR 4+ 5   Shoulder ER 4 5            Scapular Left (1-5) Right (1-5)   Lower Trap 4- 4+   Middle Trap 3+ 4   Rhomboids 3 4                                Elbow/Wrist Left (1-5) Right (1-5)   Elbow Flexion 4+ 5   Elbow Extension 4+ 5   Wrist FLexion 4 5   Wrist Extnesion 4+ 5                     Current: nearly met, left UE and scapular strength grossly 4+/5                                    4. Pt will have painfree cervical AROM, with bilat side bend to at least 35 degrees, to aid in functional mechanics for ambulation/ADLs. Eval Status:   Cervical Left Right   Flexion 31     Extension 45     Side bend 30 21   Rotation 67 65    Current:      Long Term Goals: To be accomplished in 6 weeks:  1. Patient will improve FOTO score by 6 points to improve functional tolerance for performing ADLs and work duties. Eval Status: FOTO 62  FOTO score = an established functional score where 100 = no disability  Current: 52 12/10/21     2. Pt will have painfree cervical AROM, with bilat side bend to at least 45 degrees, to aid in functional mechanics for ambulation/ADLs. Eval Status:   Cervical Left Right   Flexion 31     Extension 45     Side bend 30 21   Rotation 67 65   Current:       3. Pt will have 5/5 cervical strength to return to goals of tolerating sustained postures. Eval Status:   Cervical Left (1-5) Right (1-5)   Flexion 4     Extension 4+     Side Bend 4- 4-   Rotation 4 4-   Comments: Pain with all cervical MMT  Current:      4. Patient will improve pain in neck and left UE to 1-2/10 at worst to improve activity tolerance and restore prior level of function.   Eval Status: 10/10 at worst  Current: slow progression, patient continues to report frequent headaches        PLAN  []  Upgrade activities as tolerated     [x]  Continue plan of care  []  Update interventions per flow sheet       []  Discharge due to:_  []  Other:_      Jolene Bucio 12/15/2021  10:48 AM    Future Appointments   Date Time Provider Poncho Dias   12/15/2021 11:00 AM [facility]   12/20/2021  4:15 PM Norm Ellison, PT [facility]   12/22/2021 10:15 AM [facility]

## 2021-12-20 ENCOUNTER — HOSPITAL ENCOUNTER (OUTPATIENT)
Dept: PHYSICAL THERAPY | Age: 51
Discharge: HOME | End: 2021-12-20
Payer: COMMERCIAL

## 2021-12-20 PROCEDURE — 97110 THERAPEUTIC EXERCISES: CPT

## 2021-12-20 PROCEDURE — 97530 THERAPEUTIC ACTIVITIES: CPT

## 2021-12-20 PROCEDURE — 97112 NEUROMUSCULAR REEDUCATION: CPT

## 2021-12-20 NOTE — PROGRESS NOTES
PT DAILY TREATMENT NOTE    Patient Name: Kyrie Ross  Date:2021  : 1970  [x]  Patient  Verified  Payor: [id number] / Plan: [facility] [employer] [address] / Product Type: Commerical /    In time:1615  Out time:1700  Total Treatment Time (min): 45  Total Timed Codes (min): 45  1:1 Treatment Time (MC/BCBS only): 45   Visit #: 7 of 12    Treatment Dx: Cervicalgia [M54.2]    SUBJECTIVE  Pain Level (0-10 scale): 2  Any medication changes, allergies to medications, adverse drug reactions, diagnosis change, or new procedure performed?: [x] No    [] Yes (see summary sheet for update)  Subjective functional status/changes:   [] No changes reported  Pt pleasant, reports no new changes. OBJECTIVE    15 min Therapeutic Exercise:  [x] See flow sheet :   Rationale: increase ROM and increase strength to improve the patients ability to restore PLOF     15 min Therapeutic Activity:  [x]  See flow sheet :   Rationale: increase strength and improve coordination  to improve the patients ability to complete transfers and ADLs without external assist      15 min Neuromuscular Re-education:  [x]  See flow sheet :   Rationale: improve coordination and increase proprioception  to improve the patients ability to activate cervical stabilizers without compensation    With   [x] TE   [x] TA   [x] neuro   [] other: Patient Education: [x] Review HEP    [] Progressed/Changed HEP based on:   [] positioning   [] body mechanics   [] transfers   [] heat/ice application    [] other:      Other Objective/Functional Measures: NA     Pain Level (0-10 scale) post treatment: 1    ASSESSMENT/Changes in Function: Patient tolerated treatment session well today. Patient had no complaints with addition of bow and arrow to exercise program to accomplish improved scapulothoracic mobility.   Patient continues to make good progress toward goals and would benefit from continued skilled PT intervention to address remaining deficits outlined in goals below. Patient will continue to benefit from skilled PT services to modify and progress therapeutic interventions, address functional mobility deficits, address ROM deficits, address strength deficits, analyze and address soft tissue restrictions, analyze and cue movement patterns, analyze and modify body mechanics/ergonomics and assess and modify postural abnormalities to attain remaining goals. [x]  See Plan of Care  []  See progress note/recertification  []  See Discharge Summary         Progress towards goals / Updated goals:  Short Term Goals: To be accomplished in 3 weeks:  1. Patient will be independent and compliant with HEP to progress toward goals and restore functional mobility. Eval Status: issued at eval  Current: Pt reports she is attempting to do the stretches while at work bc they help to alleviate the pain and tension - the rest she gets to once she gets home 12/8/21     2. Patient will improve pain in neck and left UE  to 5/10  to improve activity tolerance and restore prior level of function. Eval Status: 10/10 at worst   Current:  6/10 at worst 11/24/21      3. Pt will have 5/5 UE and scapular strength to return to goals of performing ADLs. Eval Status:   Shoulder Left (1-5) Right (1-5)   Shoulder Flexion 4 5   Shoulder ABD 4 5   Shoulder IR 4+ 5   Shoulder ER 4 5            Scapular Left (1-5) Right (1-5)   Lower Trap 4- 4+   Middle Trap 3+ 4   Rhomboids 3 4                                Elbow/Wrist Left (1-5) Right (1-5)   Elbow Flexion 4+ 5   Elbow Extension 4+ 5   Wrist FLexion 4 5   Wrist Extnesion 4+ 5                       Current: nearly met, left UE and scapular strength grossly 4+/5                                    4. Pt will have painfree cervical AROM, with bilat side bend to at least 35 degrees, to aid in functional mechanics for ambulation/ADLs.   Eval Status:   Cervical Left Right   Flexion 31     Extension 45     Side bend 30 21 Rotation 67 65    Current:      Long Term Goals: To be accomplished in 6 weeks:  1. Patient will improve FOTO score by 6 points to improve functional tolerance for performing ADLs and work duties. Eval Status: FOTO 62  FOTO score = an established functional score where 100 = no disability  Current: 52 12/10/21     2. Pt will have painfree cervical AROM, with bilat side bend to at least 45 degrees, to aid in functional mechanics for ambulation/ADLs. Eval Status:   Cervical Left Right   Flexion 31     Extension 45     Side bend 30 21   Rotation 67 65   Current:       3. Pt will have 5/5 cervical strength to return to goals of tolerating sustained postures. Eval Status:   Cervical Left (1-5) Right (1-5)   Flexion 4     Extension 4+     Side Bend 4- 4-   Rotation 4 4-   Comments: Pain with all cervical MMT  Current:      4. Patient will improve pain in neck and left UE to 1-2/10 at worst to improve activity tolerance and restore prior level of function.   Eval Status: 10/10 at worst  Current: slow progression, patient continues to report frequent headaches that are stress related 12/20/21              PLAN  [x]  Upgrade activities as tolerated     [x]  Continue plan of care  [x]  Update interventions per flow sheet       []  Discharge due to:_  []  Other:_      Toño Márquez, PT 12/20/2021  5:04 PM    Future Appointments   Date Time Provider Poncho Dias   12/22/2021 10:15 AM Zak Weathers [facility]

## 2021-12-22 ENCOUNTER — APPOINTMENT (OUTPATIENT)
Dept: PHYSICAL THERAPY | Age: 51
End: 2021-12-22
Payer: COMMERCIAL

## 2022-02-08 NOTE — PROGRESS NOTES
[facility]  [address] [facility]  [facility], [address]  Ph [phone]  Fx [phone]    Physical Therapy Discharge Summary    Patient name: Kera Zuniga Start of Care: 2021   Referral source: Kiko Rm MD : 1970                Medical Diagnosis: Cervicalgia [M54.2]    Onset Date:10/25/21   Treatment Diagnosis: Neck pain                                              ICD-10: M54.2   Prior Hospitalization: see medical history Provider#: [id number]   Medications: Verified on Patient summary List    Comorbidities: OA of the spine; Hx of LBP with sciatica; HTN; Heart Disease; Osteoporosis; Hysterectomy; Tube ablation; Ovarian cyst removal; Hx of hip pain   Prior Level of Function: functionally independent, no AD, somewhat active lifestyle                                  Visits from Start of Care: 7    Missed Visits: 0    Reporting Period : 21 to 21    Goals/Measure of Progress:  Short Term Goals: To be accomplished in 3 weeks:  1. Patient will be independent and compliant with HEP to progress toward goals and restore functional mobility. Eval Status: issued at eval  Current: Pt reports she is attempting to do the stretches while at work bc they help to alleviate the pain and tension - the rest she gets to once she gets home 21     2. Patient will improve pain in neck and left UE  to 5/10  to improve activity tolerance and restore prior level of function. Eval Status: 10/10 at worst   Current:  6/10 at worst 21      3. Pt will have 5/5 UE and scapular strength to return to goals of performing ADLs.   Eval Status:   Shoulder Left (1-5) Right (1-5)   Shoulder Flexion 4 5   Shoulder ABD 4 5   Shoulder IR 4+ 5   Shoulder ER 4 5            Scapular Left (1-5) Right (1-5)   Lower Trap 4- 4+   Middle Trap 3+ 4   Rhomboids 3 4                                Elbow/Wrist Left (1-5) Right (1-5)   Elbow Flexion 4+ 5   Elbow Extension 4+ 5   Wrist FLexion 4 5   Wrist Extnesion 4+ 5                       Current: nearly met, left UE and scapular strength grossly 4+/5                                    4. Pt will have painfree cervical AROM, with bilat side bend to at least 35 degrees, to aid in functional mechanics for ambulation/ADLs. Eval Status:   Cervical Left Right   Flexion 31     Extension 45     Side bend 30 21   Rotation 67 65    Current:      Long Term Goals: To be accomplished in 6 weeks:  1. Patient will improve FOTO score by 6 points to improve functional tolerance for performing ADLs and work duties. Eval Status: FOTO 62  FOTO score = an established functional score where 100 = no disability  Current: 52 12/10/21     2. Pt will have painfree cervical AROM, with bilat side bend to at least 45 degrees, to aid in functional mechanics for ambulation/ADLs. Eval Status:   Cervical Left Right   Flexion 31     Extension 45     Side bend 30 21   Rotation 67 65   Current:       3. Pt will have 5/5 cervical strength to return to goals of tolerating sustained postures. Eval Status:   Cervical Left (1-5) Right (1-5)   Flexion 4     Extension 4+     Side Bend 4- 4-   Rotation 4 4-   Comments: Pain with all cervical MMT  Current:      4. Patient will improve pain in neck and left UE to 1-2/10 at worst to improve activity tolerance and restore prior level of function. Eval Status: 10/10 at worst  Current: slow progression, patient continues to report frequent headaches that are stress related 12/20/21              Assessment/ Summary of Care: Unable to formally assess goals as pt failed to show for last scheduled followup appt. Please see below for goals assessment while pt was current under the care of this clinic. Please DC to [facility] at this time as pt reports MD has cleared her. Thank you for this referral. Pt will require new order if he/she requires further rehab services.       RECOMMENDATIONS:  [x]Discontinue therapy: []Patient reports MD cleared patient to return to full duty and DC from PT      []Patient is non-compliant or has abdicated      []Due to lack of appreciable progress towards set goals    Virgen Ellison, PT 2/8/2022 11:38 AM

## 2022-03-18 PROBLEM — M16.12 PRIMARY OSTEOARTHRITIS OF LEFT HIP: Status: ACTIVE | Noted: 2018-01-30

## 2022-03-18 PROBLEM — M21.372 LEFT FOOT DROP: Status: ACTIVE | Noted: 2018-01-25

## 2022-03-18 PROBLEM — Z72.0 TOBACCO ABUSE: Status: ACTIVE | Noted: 2020-10-27

## 2022-03-18 PROBLEM — M25.552 PAIN OF LEFT HIP JOINT: Status: ACTIVE | Noted: 2017-11-27

## 2022-03-19 PROBLEM — M79.2 NEURITIS: Status: ACTIVE | Noted: 2017-07-14

## 2022-03-19 PROBLEM — M51.36 BULGING LUMBAR DISC: Status: ACTIVE | Noted: 2017-07-14

## 2022-03-19 PROBLEM — K21.9 GERD (GASTROESOPHAGEAL REFLUX DISEASE): Status: ACTIVE | Noted: 2020-10-27

## 2022-03-19 PROBLEM — M51.369 BULGING LUMBAR DISC: Status: ACTIVE | Noted: 2017-07-14

## 2022-03-19 PROBLEM — M79.18 MYOFASCIAL PAIN: Status: ACTIVE | Noted: 2017-07-14

## 2022-03-19 PROBLEM — F41.1 GAD (GENERALIZED ANXIETY DISORDER): Status: ACTIVE | Noted: 2020-10-27

## 2022-03-19 PROBLEM — M47.812 CERVICAL FACET JOINT SYNDROME: Status: ACTIVE | Noted: 2017-10-09

## 2022-03-20 PROBLEM — M47.816 LUMBAR FACET ARTHROPATHY: Status: ACTIVE | Noted: 2017-07-14

## 2022-04-17 ENCOUNTER — NURSE TRIAGE (OUTPATIENT)
Dept: OTHER | Facility: CLINIC | Age: 52
End: 2022-04-17

## 2022-04-17 NOTE — TELEPHONE ENCOUNTER
Subjective: Caller states \"I fell Friday afternoon and jammed my toe into concrete\"     Current Symptoms: big toe right foot pain, purple, swollen, unable to put pressure on it. Will not bend. Onset: 2 days ago; sudden    Associated Symptoms: reduced activity    Pain Severity: 6/10; throbbing; constant    Temperature: Denies     What has been tried: ice, tylenol    LMP: NA Pregnant: NA    Recommended disposition: See HCP within 4 Hours (or PCP triage)    Care advice provided, patient verbalizes understanding; denies any other questions or concerns; instructed to call back for any new or worsening symptoms. Patient/caller agrees to proceed to nearest [facility] as after hours and holiday weekend. Attention Provider: Thank you for allowing me to participate in the care of your patient. The patient was connected to triage in response to symptoms provided. Please do not respond through this encounter as the response is not directed to a shared pool.     Reason for Disposition   [1] MODERATE-SEVERE pain AND [2] blood present under the toenail    Protocols used: TOE INJURY-ADULT-

## 2022-12-22 ENCOUNTER — TRANSCRIBE ORDER (OUTPATIENT)
Dept: SCHEDULING | Age: 52
End: 2022-12-22

## 2022-12-22 ENCOUNTER — HOSPITAL ENCOUNTER (EMERGENCY)
Age: 52
Discharge: HOME | End: 2022-12-22
Attending: EMERGENCY MEDICINE
Payer: COMMERCIAL

## 2022-12-22 ENCOUNTER — APPOINTMENT (OUTPATIENT)
Dept: GENERAL RADIOLOGY | Age: 52
End: 2022-12-22
Attending: EMERGENCY MEDICINE
Payer: COMMERCIAL

## 2022-12-22 VITALS
RESPIRATION RATE: 14 BRPM | BODY MASS INDEX: 30.9 KG/M2 | TEMPERATURE: 98.1 F | OXYGEN SATURATION: 98 % | SYSTOLIC BLOOD PRESSURE: 120 MMHG | DIASTOLIC BLOOD PRESSURE: 70 MMHG | WEIGHT: 181 LBS | HEIGHT: 64 IN | HEART RATE: 78 BPM

## 2022-12-22 DIAGNOSIS — R07.9 CHEST PAIN: Primary | ICD-10-CM

## 2022-12-22 DIAGNOSIS — I20.0 UNSTABLE ANGINA PECTORIS (HCC): Primary | ICD-10-CM

## 2022-12-22 LAB
ALBUMIN SERPL-MCNC: 3.7 G/DL (ref 3.4–5)
ALBUMIN/GLOB SERPL: 1.3 {RATIO} (ref 0.8–1.7)
ALP SERPL-CCNC: 68 U/L (ref 45–117)
ALT SERPL-CCNC: 33 U/L (ref 13–56)
ANION GAP SERPL CALC-SCNC: 5 MMOL/L (ref 3–18)
AST SERPL-CCNC: 19 U/L (ref 10–38)
BASOPHILS # BLD: 0 K/UL (ref 0–0.1)
BASOPHILS NFR BLD: 1 % (ref 0–2)
BILIRUB SERPL-MCNC: 0.3 MG/DL (ref 0.2–1)
BUN SERPL-MCNC: 17 MG/DL (ref 7–18)
BUN/CREAT SERPL: 19 (ref 12–20)
CALCIUM SERPL-MCNC: 9.3 MG/DL (ref 8.5–10.1)
CHLORIDE SERPL-SCNC: 108 MMOL/L (ref 100–111)
CO2 SERPL-SCNC: 27 MMOL/L (ref 21–32)
CREAT SERPL-MCNC: 0.89 MG/DL (ref 0.6–1.3)
DIFFERENTIAL METHOD BLD: ABNORMAL
EOSINOPHIL # BLD: 0.3 K/UL (ref 0–0.4)
EOSINOPHIL NFR BLD: 5 % (ref 0–5)
ERYTHROCYTE [DISTWIDTH] IN BLOOD BY AUTOMATED COUNT: 13.4 % (ref 11.6–14.5)
GLOBULIN SER CALC-MCNC: 2.8 G/DL (ref 2–4)
GLUCOSE SERPL-MCNC: 120 MG/DL (ref 74–99)
HCT VFR BLD AUTO: 36.9 % (ref 35–45)
HGB BLD-MCNC: 12.2 G/DL (ref 12–16)
IMM GRANULOCYTES # BLD AUTO: 0.1 K/UL (ref 0–0.04)
IMM GRANULOCYTES NFR BLD AUTO: 1 % (ref 0–0.5)
LYMPHOCYTES # BLD: 1.7 K/UL (ref 0.9–3.6)
LYMPHOCYTES NFR BLD: 34 % (ref 21–52)
MAGNESIUM SERPL-MCNC: 2 MG/DL (ref 1.6–2.6)
MCH RBC QN AUTO: 30.8 PG (ref 24–34)
MCHC RBC AUTO-ENTMCNC: 33.1 G/DL (ref 31–37)
MCV RBC AUTO: 93.2 FL (ref 78–100)
MONOCYTES # BLD: 0.4 K/UL (ref 0.05–1.2)
MONOCYTES NFR BLD: 8 % (ref 3–10)
NEUTS SEG # BLD: 2.6 K/UL (ref 1.8–8)
NEUTS SEG NFR BLD: 51 % (ref 40–73)
NRBC # BLD: 0 K/UL (ref 0–0.01)
NRBC BLD-RTO: 0 PER 100 WBC
PLATELET # BLD AUTO: 229 K/UL (ref 135–420)
PMV BLD AUTO: 8.9 FL (ref 9.2–11.8)
POTASSIUM SERPL-SCNC: 4 MMOL/L (ref 3.5–5.5)
PROT SERPL-MCNC: 6.5 G/DL (ref 6.4–8.2)
RBC # BLD AUTO: 3.96 M/UL (ref 4.2–5.3)
SODIUM SERPL-SCNC: 140 MMOL/L (ref 136–145)
TROPONIN-HIGH SENSITIVITY: 4 NG/L (ref 0–54)
TROPONIN-HIGH SENSITIVITY: 4 NG/L (ref 0–54)
WBC # BLD AUTO: 5.1 K/UL (ref 4.6–13.2)

## 2022-12-22 PROCEDURE — 93005 ELECTROCARDIOGRAM TRACING: CPT

## 2022-12-22 PROCEDURE — 84484 ASSAY OF TROPONIN QUANT: CPT

## 2022-12-22 PROCEDURE — 80053 COMPREHEN METABOLIC PANEL: CPT

## 2022-12-22 PROCEDURE — 71045 X-RAY EXAM CHEST 1 VIEW: CPT

## 2022-12-22 PROCEDURE — 83735 ASSAY OF MAGNESIUM: CPT

## 2022-12-22 PROCEDURE — 74011000250 HC RX REV CODE- 250: Performed by: PHYSICIAN ASSISTANT

## 2022-12-22 PROCEDURE — 96374 THER/PROPH/DIAG INJ IV PUSH: CPT

## 2022-12-22 PROCEDURE — 99285 EMERGENCY DEPT VISIT HI MDM: CPT

## 2022-12-22 PROCEDURE — 74011250636 HC RX REV CODE- 250/636: Performed by: EMERGENCY MEDICINE

## 2022-12-22 PROCEDURE — 85025 COMPLETE CBC W/AUTO DIFF WBC: CPT

## 2022-12-22 PROCEDURE — 74011250637 HC RX REV CODE- 250/637: Performed by: EMERGENCY MEDICINE

## 2022-12-22 PROCEDURE — 74011250637 HC RX REV CODE- 250/637: Performed by: PHYSICIAN ASSISTANT

## 2022-12-22 RX ORDER — CARISOPRODOL 250 MG/1
250 TABLET ORAL
COMMUNITY

## 2022-12-22 RX ORDER — ASPIRIN 81 MG/1
TABLET ORAL
COMMUNITY
Start: 2020-11-03

## 2022-12-22 RX ORDER — HYDROCODONE BITARTRATE AND ACETAMINOPHEN 7.5; 3 MG/1; MG/1
1 TABLET ORAL
COMMUNITY

## 2022-12-22 RX ORDER — NITROGLYCERIN 0.4 MG/1
TABLET SUBLINGUAL
COMMUNITY
Start: 2020-11-19

## 2022-12-22 RX ORDER — MORPHINE SULFATE 4 MG/ML
4 INJECTION INTRAVENOUS ONCE
Status: COMPLETED | OUTPATIENT
Start: 2022-12-22 | End: 2022-12-22

## 2022-12-22 RX ADMIN — NITROGLYCERIN 1 INCH: 20 OINTMENT TOPICAL at 09:13

## 2022-12-22 RX ADMIN — ALUMINUM HYDROXIDE, MAGNESIUM HYDROXIDE, AND SIMETHICONE 40 ML: 200; 200; 20 SUSPENSION ORAL at 09:39

## 2022-12-22 RX ADMIN — MORPHINE SULFATE 4 MG: 4 INJECTION INTRAVENOUS at 09:13

## 2022-12-22 NOTE — DISCHARGE INSTRUCTIONS
Follow-up with cardiology. Call immediately for the next available appointment. Return to the ED for worsening symptoms or for other concerns.

## 2022-12-22 NOTE — ED PROVIDER NOTES
EMERGENCY DEPARTMENT HISTORY AND PHYSICAL EXAM    Date: 12/22/2022  Patient Name: Rayna Vallejo    History of Presenting Illness     Chief Complaint   Patient presents with    Chest Pain       History Provided By: Patient and Patient's      History Candance Blood):   8:50 AM  Rayna Vallejo is a 47 y.o. female  with a PMHx of Hypertension, angina  who presents to the emergency department (room 6) by POV C/O chest pressure onset 1 to 2 weeks ago. Associated sxs include shortness of breath, radiation to her left arm. Pt denies any other sxs or complaints. Patient states that initially pain was relieved with nitroglycerin at home. She states that it is worsening and is no longer being relieved with nitro. Chief Complaint: Chest pain  Onset: 2 weeks  Timing:  Acute  Context: Symptoms started spontaneously, symptoms have progressively worsened since onset  Location: Chest  Quality: Pressure  Severity: Moderate  Modifying Factors: Nothing makes it better, or worse.   Associated Symptoms:  Left arm pain, shortness of breath    PCP: Chance Garcia MD     Past History         Past Medical History:  Past Medical History:   Diagnosis Date    Anxiety     Arthritis     Asthma     pt states she does not have asthma    Chronic pain     Chronic Back Pain     Depression     Gastrointestinal disorder     GERD (gastroesophageal reflux disease)     well controlled w/ meds    Hypertension     no medications currently; off [id number]    IBS (irritable bowel syndrome)     Other ill-defined conditions(799.89)     bulging disc    Other ill-defined conditions(799.89)     sinusitis    Tattoo     TATTOO       Past Surgical History:  Past Surgical History:   Procedure Laterality Date    ENDOSCOPY, COLON, DIAGNOSTIC      HX BREAST BIOPSY      left    HX CYST INCISION AND DRAINAGE      left    HX GYN  2011    cysts removed from both ovaries    HX HYSTERECTOMY      partial     NM LAP,CHOLECYSTECTOMY  2-25-16    Dr. Trey Dillard History:  Family History   Problem Relation Age of Onset    Breast Cancer Mother     Heart Disease Father     Coronary Art Dis Other     Breast Cancer Sister     Heart Disease Paternal Grandfather    Reviewed and non-contributory    Social History:  Social History     Tobacco Use    Smoking status: Former     Packs/day: 1.00     Types: Cigarettes     Quit date: 2020     Years since quittin.1    Smokeless tobacco: Never   Substance Use Topics    Alcohol use: Yes     Alcohol/week: 2.0 standard drinks     Types: 2 Glasses of wine per week     Comment: depends on stress     Drug use: No       Medications:  Current Outpatient Medications   Medication Sig Dispense Refill    nitroglycerin (NITROSTAT) 0.4 mg SL tablet by SubLINGual route. aspirin delayed-release 81 mg tablet Take  by mouth.      carisoprodoL (Soma) 250 mg tablet Take 250 mg by mouth every eight (8) hours as needed for Muscle Spasm(s). HYDROcodone-acetaminophen (XODOL) 7.5-300 mg tablet Take 1 Tablet by mouth four (4) times daily as needed. doxylamine succinate (UNISOM) 25 mg tablet Take 50 mg by mouth nightly as needed. diltiazem HCl (CARDIZEM PO) Take 240 mg by mouth daily. ezetimibe (ZETIA) 10 mg tablet Take  by mouth.      ergocalciferol, vitamin D2, (VITAMIN D2 PO) Take 3,000 mg by mouth daily. cyanocobalamin (Vitamin B-12) 1,000 mcg tablet Take 1,000 mcg by mouth daily. Omega-3-DHA-EPA-Fish Oil (Fish OiL) 1,200 (144-216) mg cap Take  by mouth daily. magnesium 250 mg tab Take  by mouth.      linaCLOtide (Linzess) 145 mcg cap capsule Take  by mouth Daily (before breakfast).       citalopram (CELEXA) 10 mg tablet TAKE 1 TABLET BY ORAL ROUTE EVERY DAY (Patient not taking: Reported on 10/27/2021)  5    SYMBICORT 160-4.5 mcg/actuation HFA inhaler inhale 2 puffs by mouth twice a day IN THE MORNING AND EVENING (Patient not taking: Reported on 10/27/2021)  0    cholecalciferol (VITAMIN D3) 1,000 unit tablet Take 3,000 Units by mouth daily. Allergies: Allergies   Allergen Reactions    Percocet [Oxycodone-Acetaminophen] Nausea and Vomiting    Advair Diskus [Fluticasone Propion-Salmeterol] Nausea and Vomiting    Benadr [Diphenhydramine Hcl] Shortness of Breath     CP    Ibuprofen Nausea and Vomiting    Tylenol [Acetaminophen] Other (comments)     Causes upset stomach       Social Determinants of Health:  Social Determinants of Health     Tobacco Use: Medium Risk    Smoking Tobacco Use: Former    Smokeless Tobacco Use: Never    Passive Exposure: Not on file   Alcohol Use: Not on file   Financial Resource Strain: Not on file   Food Insecurity: Not on file   Transportation Needs: Not on file   Physical Activity: Not on file   Stress: Not on file   Social Connections: Not on file   Intimate Partner Violence: Not on file   Depression: Not on file   Housing Stability: Not on file       Review of Systems      Review of Systems   Constitutional:  Negative for chills and fever. HENT:  Negative for congestion, rhinorrhea and sore throat. Eyes:  Negative for pain and visual disturbance. Respiratory:  Positive for shortness of breath. Negative for cough and wheezing. Cardiovascular:  Positive for chest pain. Negative for palpitations. Gastrointestinal:  Negative for abdominal pain, diarrhea and vomiting. Genitourinary:  Negative for dysuria, flank pain, frequency and urgency. Musculoskeletal:  Negative for arthralgias and myalgias. Skin:  Negative for rash and wound. Neurological:  Negative for speech difficulty, weakness, light-headedness and headaches. Psychiatric/Behavioral:  Negative for agitation and confusion. All other systems reviewed and are negative.     Physical Exam     Vitals:    12/22/22 0846 12/22/22 0901 12/22/22 0914 12/22/22 1232   BP: (!) 130/93  123/77 120/70   Pulse:  95 87 78   Resp:  16 19 14   Temp:       SpO2:  100% 97% 98%   Weight:       Height:           Physical Exam  Vitals and nursing note reviewed. Constitutional:       General: She is not in acute distress. Appearance: Normal appearance. She is normal weight. She is not ill-appearing. HENT:      Head: Normocephalic and atraumatic. Nose: Nose normal. No rhinorrhea. Mouth/Throat:      Mouth: Mucous membranes are moist.      Pharynx: No oropharyngeal exudate or posterior oropharyngeal erythema. Eyes:      Extraocular Movements: Extraocular movements intact. Conjunctiva/sclera: Conjunctivae normal.      Pupils: Pupils are equal, round, and reactive to light. Cardiovascular:      Rate and Rhythm: Normal rate and regular rhythm. Heart sounds: No murmur heard. No friction rub. No gallop. Pulmonary:      Effort: Pulmonary effort is normal. No respiratory distress. Breath sounds: Normal breath sounds. No wheezing, rhonchi or rales. Abdominal:      General: Bowel sounds are normal.      Palpations: Abdomen is soft. Tenderness: There is no abdominal tenderness. There is no guarding or rebound. Musculoskeletal:         General: No swelling, tenderness or deformity. Normal range of motion. Cervical back: Normal range of motion and neck supple. No rigidity. Lymphadenopathy:      Cervical: No cervical adenopathy. Skin:     General: Skin is warm and dry. Findings: No rash. Neurological:      General: No focal deficit present. Mental Status: She is alert and oriented to person, place, and time.    Psychiatric:         Mood and Affect: Mood normal.         Behavior: Behavior normal.       Diagnostic Study Results     Labs -  Recent Results (from the past 12 hour(s))   EKG, 12 LEAD, SUBSEQUENT    Collection Time: 12/22/22  9:37 AM   Result Value Ref Range    Ventricular Rate 76 BPM    Atrial Rate 76 BPM    P-R Interval 160 ms    QRS Duration 76 ms    Q-T Interval 386 ms    QTC Calculation (Bezet) 434 ms    Calculated P Axis 75 degrees    Calculated R Axis 63 degrees    Calculated T Axis 78 degrees    Diagnosis       Normal sinus rhythm  Nonspecific ST and T wave abnormality  Abnormal ECG  When compared with ECG of 22-DEC-2022 08:43,  No significant change was found     TROPONIN-HIGH SENSITIVITY    Collection Time: 12/22/22 10:15 AM   Result Value Ref Range    Troponin-High Sensitivity 4 0 - 54 ng/L       Radiologic Studies -   XR CHEST PORT   Final Result      No acute findings in the chest.         CT Results  (Last 48 hours)      None          CXR Results  (Last 48 hours)                 12/22/22 0900  XR CHEST PORT Final result    Impression:      No acute findings in the chest.        Narrative:  EXAM: XR CHEST PORT       CLINICAL INDICATION/HISTORY: cp   -Additional: None       COMPARISON: 10/27/2020       TECHNIQUE: Frontal view of the chest       _______________       FINDINGS:       HEART AND MEDIASTINUM: Normal cardiac size and mediastinal contours. LUNGS AND PLEURAL SPACES: No focal pneumonic consolidation, pneumothorax, or   pleural effusion. BONY THORAX AND SOFT TISSUES: No acute osseous abnormality       _______________                   Medications given in the ED-  Medications   morphine injection 4 mg (4 mg IntraVENous Given 12/22/22 0913)   nitroglycerin (NITROBID) 2 % ointment 1 Inch (1 Inch Topical SEE PAPER MAR 12/22/22 1411)   mylanta/viscous lidocaine (GI COCKTAIL) (40 mL Oral Given 12/22/22 4069)       Procedures     Procedures    ED Course     I Tatyana Pride MD) am the first provider for this patient. I reviewed the vital signs, available nursing notes, past medical history, past surgical history, family history and social history. Records Reviewed: Nursing Notes    Cardiac Monitor:  Rate: 115 bpm  Rhythm: Tachycardic Rhythm    Pulse Oximetry Analysis - 100% on RA    EKG interpretation: (Preliminary)  Rhythm: NSR. Rate: 97 bpm; no STEMI  EKG read by Tatyana Pride MD at 8:43 AM    EKG interpretation: (Repeat)  Rhythm: Normal sinus rhythm.  Rate: 76 bpm;'s no STEMI  EKG read by Oleg Bermeo MD at 9:37 AM    8:50 AM Initial assessment performed. The patients presenting problems have been discussed, and they are in agreement with the care plan formulated and outlined with them. I have encouraged them to ask questions as they arise throughout their visit. ED Course as of 12/22/22 2111   Thu Dec 22, 2022   1237 CONSULT NOTE:   Oleg Bermeo MD spoke with Dr. Renae Lyles   Specialty: Cardiology  Discussed pt's hx, disposition, and available diagnostic and imaging results by telephone. Reviewed care plans. Plan to admit patient for stress test tomorrow. Oleg Bermeo MD.   [JM]   4053 Offered patient inpatient admission for stress test.  She declined stating she would rather follow-up as an outpatient due to the holidays [JM]      ED Course User Index  [JM] Leopold Searle, MD         Medical Decision Making     Provider Notes (Medical Decision Making):   DDX: ACS, URI, pneumonia, musculoskeletal chest pain, noncardiac chest pain    Discussion:  47 y.o. female with 2 weeks of chest pain and shortness of breath which has been relieved with nitro but is no longer responding. This is most consistent with unstable angina however her cardiac markers are normal.  Discussed with cardiology and offered the patient inpatient admission however she declined because of the holidays. She states that she will follow-up with her cardiologist and has already called for an appointment. In evaluation of the above differential diagnosis, consideration was given to the following tests and treatments. Diagnosis and Disposition     DISCHARGE NOTE:  2:12 PM  Carmelina Hong's  results have been reviewed with her. She has been counseled regarding her diagnosis, treatment, and plan. She verbally conveys understanding and agreement of the signs, symptoms, diagnosis, treatment and prognosis and additionally agrees to follow up as discussed.   She also agrees with the care-plan and conveys that all of her questions have been answered. I have also provided discharge instructions for her that include: educational information regarding their diagnosis and treatment, and list of reasons why they would want to return to the ED prior to their follow-up appointment, should her condition change. She has been provided with education for proper emergency department utilization. CLINICAL IMPRESSION:    1. Unstable angina pectoris ([address]        PLAN:  1. D/C Home  2. Discharge Medication List as of 12/22/2022  2:03 PM        3. Follow-up Information       Follow up With Specialties Details Why Suri Ceja MD Cardiovascular Disease Physician, Internal Medicine Physician Schedule an appointment as soon as possible for a visit  As soon as possible, For follow up from Emergency Department visit. [address] [facility]      [place] MD Family Medicine Schedule an appointment as soon as possible for a visit  As soon as possible, For follow up from Emergency Department visit. [address]      [facility] Emergency Medicine  As needed; If symptoms worsen [address] Carolynn Rios [phone] [facility] Alicia MD am the primary clinician of record. [employer] Disclaimer     Please note that this dictation was completed with [employer], the computer voice recognition software. Quite often unanticipated grammatical, syntax, homophones, and other interpretive errors are inadvertently transcribed by the computer software. Please disregard these errors. Please excuse any errors that have escaped final proofreading.     [email or web address]    Alina Alicia MD

## 2022-12-23 ENCOUNTER — PATIENT OUTREACH (OUTPATIENT)
Dept: OTHER | Age: 52
End: 2022-12-23

## 2022-12-23 NOTE — PROGRESS NOTES
Patient on report as eligible for Case Management. Left discreet message on voicemail with this CM contact information. Will attempt to contact again to offer [address] [place] [address] Management services. Per chart review, patient has cardiology appointment on 12/28/22.

## 2022-12-25 LAB
ATRIAL RATE: 76 BPM
ATRIAL RATE: 97 BPM
CALCULATED P AXIS, ECG09: 46 DEGREES
CALCULATED P AXIS, ECG09: 75 DEGREES
CALCULATED R AXIS, ECG10: 63 DEGREES
CALCULATED R AXIS, ECG10: 78 DEGREES
CALCULATED T AXIS, ECG11: 55 DEGREES
CALCULATED T AXIS, ECG11: 78 DEGREES
DIAGNOSIS, 93000: NORMAL
DIAGNOSIS, 93000: NORMAL
P-R INTERVAL, ECG05: 152 MS
P-R INTERVAL, ECG05: 160 MS
Q-T INTERVAL, ECG07: 338 MS
Q-T INTERVAL, ECG07: 386 MS
QRS DURATION, ECG06: 76 MS
QRS DURATION, ECG06: 82 MS
QTC CALCULATION (BEZET), ECG08: 429 MS
QTC CALCULATION (BEZET), ECG08: 434 MS
VENTRICULAR RATE, ECG03: 76 BPM
VENTRICULAR RATE, ECG03: 97 BPM

## 2022-12-28 ENCOUNTER — HOSPITAL ENCOUNTER (OUTPATIENT)
Dept: NON INVASIVE DIAGNOSTICS | Age: 52
Discharge: HOME | End: 2022-12-28
Attending: INTERNAL MEDICINE
Payer: COMMERCIAL

## 2022-12-28 ENCOUNTER — HOSPITAL ENCOUNTER (OUTPATIENT)
Dept: NUCLEAR MEDICINE | Age: 52
Discharge: HOME | End: 2022-12-28
Attending: INTERNAL MEDICINE
Payer: COMMERCIAL

## 2022-12-28 ENCOUNTER — HOSPITAL ENCOUNTER (OUTPATIENT)
Age: 52
Setting detail: OUTPATIENT SURGERY
Discharge: HOME | End: 2022-12-28
Attending: INTERNAL MEDICINE | Admitting: INTERNAL MEDICINE
Payer: COMMERCIAL

## 2022-12-28 VITALS
TEMPERATURE: 98.2 F | RESPIRATION RATE: 20 BRPM | BODY MASS INDEX: 30.9 KG/M2 | HEIGHT: 64 IN | OXYGEN SATURATION: 97 % | DIASTOLIC BLOOD PRESSURE: 89 MMHG | SYSTOLIC BLOOD PRESSURE: 140 MMHG | WEIGHT: 181 LBS | HEART RATE: 92 BPM

## 2022-12-28 DIAGNOSIS — R07.9 CHEST PAIN: ICD-10-CM

## 2022-12-28 DIAGNOSIS — I25.10 CORONARY ARTERY DISEASE INVOLVING NATIVE CORONARY ARTERY OF NATIVE HEART WITHOUT ANGINA PECTORIS: ICD-10-CM

## 2022-12-28 LAB
NUC STRESS EJECTION FRACTION: 68 %
STRESS ANGINA INDEX: 1
STRESS BASELINE HR: 90 BPM
STRESS ESTIMATED WORKLOAD: 7.7 METS
STRESS EXERCISE DUR MIN: 6 MIN
STRESS EXERCISE DUR SEC: 29 SEC
STRESS PEAK DIAS BP: 80 MMHG
STRESS PEAK SYS BP: 160 MMHG
STRESS PERCENT HR ACHIEVED: 85 %
STRESS POST PEAK HR: 142 BPM
STRESS RATE PRESSURE PRODUCT: NORMAL BPM*MMHG
STRESS TARGET HR: 167 BPM
TID: 0.65

## 2022-12-28 PROCEDURE — 77030008543 HC TBNG MON PRSS MRTM -A: Performed by: INTERNAL MEDICINE

## 2022-12-28 PROCEDURE — C1769 GUIDE WIRE: HCPCS | Performed by: INTERNAL MEDICINE

## 2022-12-28 PROCEDURE — 74011000250 HC RX REV CODE- 250: Performed by: INTERNAL MEDICINE

## 2022-12-28 PROCEDURE — 93017 CV STRESS TEST TRACING ONLY: CPT

## 2022-12-28 PROCEDURE — 77030004522 HC CATH ANGI DX EXPO BSC -A: Performed by: INTERNAL MEDICINE

## 2022-12-28 PROCEDURE — 99153 MOD SED SAME PHYS/QHP EA: CPT | Performed by: INTERNAL MEDICINE

## 2022-12-28 PROCEDURE — 77030010221 HC SPLNT WR POS TELE -B: Performed by: INTERNAL MEDICINE

## 2022-12-28 PROCEDURE — 99152 MOD SED SAME PHYS/QHP 5/>YRS: CPT | Performed by: INTERNAL MEDICINE

## 2022-12-28 PROCEDURE — 74011250636 HC RX REV CODE- 250/636: Performed by: INTERNAL MEDICINE

## 2022-12-28 PROCEDURE — 77030013797 HC KT TRNSDUC PRSSR EDWD -A: Performed by: INTERNAL MEDICINE

## 2022-12-28 PROCEDURE — 74011000636 HC RX REV CODE- 636: Performed by: INTERNAL MEDICINE

## 2022-12-28 PROCEDURE — 93458 L HRT ARTERY/VENTRICLE ANGIO: CPT | Performed by: INTERNAL MEDICINE

## 2022-12-28 PROCEDURE — 77030004521 HC CATH ANGI DX COOK -B: Performed by: INTERNAL MEDICINE

## 2022-12-28 PROCEDURE — C1894 INTRO/SHEATH, NON-LASER: HCPCS | Performed by: INTERNAL MEDICINE

## 2022-12-28 PROCEDURE — A9500 TC99M SESTAMIBI: HCPCS

## 2022-12-28 RX ORDER — DICLOFENAC SODIUM 75 MG/1
75 TABLET, DELAYED RELEASE ORAL 2 TIMES DAILY
COMMUNITY

## 2022-12-28 RX ORDER — VERAPAMIL HYDROCHLORIDE 2.5 MG/ML
INJECTION, SOLUTION INTRAVENOUS AS NEEDED
Status: DISCONTINUED | OUTPATIENT
Start: 2022-12-28 | End: 2022-12-28 | Stop reason: HOSPADM

## 2022-12-28 RX ORDER — HEPARIN SODIUM 200 [USP'U]/100ML
INJECTION, SOLUTION INTRAVENOUS
Status: COMPLETED | OUTPATIENT
Start: 2022-12-28 | End: 2022-12-28

## 2022-12-28 RX ORDER — CITALOPRAM 10 MG/1
TABLET ORAL
COMMUNITY
Start: 2021-07-21

## 2022-12-28 RX ORDER — SODIUM CHLORIDE 0.9 % (FLUSH) 0.9 %
5-40 SYRINGE (ML) INJECTION EVERY 8 HOURS
Status: DISCONTINUED | OUTPATIENT
Start: 2022-12-28 | End: 2022-12-28 | Stop reason: HOSPADM

## 2022-12-28 RX ORDER — MIDAZOLAM HYDROCHLORIDE 1 MG/ML
INJECTION, SOLUTION INTRAMUSCULAR; INTRAVENOUS AS NEEDED
Status: DISCONTINUED | OUTPATIENT
Start: 2022-12-28 | End: 2022-12-28 | Stop reason: HOSPADM

## 2022-12-28 RX ORDER — ONDANSETRON 2 MG/ML
INJECTION INTRAMUSCULAR; INTRAVENOUS AS NEEDED
Status: DISCONTINUED | OUTPATIENT
Start: 2022-12-28 | End: 2022-12-28 | Stop reason: HOSPADM

## 2022-12-28 RX ORDER — FENTANYL CITRATE 50 UG/ML
INJECTION, SOLUTION INTRAMUSCULAR; INTRAVENOUS AS NEEDED
Status: DISCONTINUED | OUTPATIENT
Start: 2022-12-28 | End: 2022-12-28 | Stop reason: HOSPADM

## 2022-12-28 RX ORDER — SODIUM CHLORIDE 9 MG/ML
25 INJECTION, SOLUTION INTRAVENOUS CONTINUOUS
Status: DISCONTINUED | OUTPATIENT
Start: 2022-12-28 | End: 2022-12-28 | Stop reason: HOSPADM

## 2022-12-28 RX ORDER — LIDOCAINE HYDROCHLORIDE 10 MG/ML
INJECTION INFILTRATION; PERINEURAL AS NEEDED
Status: DISCONTINUED | OUTPATIENT
Start: 2022-12-28 | End: 2022-12-28 | Stop reason: HOSPADM

## 2022-12-28 RX ORDER — SODIUM CHLORIDE 0.9 % (FLUSH) 0.9 %
5-40 SYRINGE (ML) INJECTION AS NEEDED
Status: DISCONTINUED | OUTPATIENT
Start: 2022-12-28 | End: 2022-12-28 | Stop reason: HOSPADM

## 2022-12-28 RX ORDER — HEPARIN SODIUM 1000 [USP'U]/ML
INJECTION, SOLUTION INTRAVENOUS; SUBCUTANEOUS AS NEEDED
Status: DISCONTINUED | OUTPATIENT
Start: 2022-12-28 | End: 2022-12-28 | Stop reason: HOSPADM

## 2022-12-28 RX ADMIN — SODIUM CHLORIDE 25 ML/HR: 9 INJECTION, SOLUTION INTRAVENOUS at 11:49

## 2022-12-28 NOTE — Clinical Note
TRANSFER - OUT REPORT:     Verbal report given to: Morgan Quiles RN. Report consisted of patient's Situation, Background, Assessment and   Recommendations(SBAR). Opportunity for questions and clarification was provided. Patient transported with a Registered Nurse. Patient transported to: holding area.

## 2022-12-28 NOTE — BRIEF OP NOTE
Brief Postoperative Note    Patient: Jaclyn Cheng  YOB: 1970  MRN: [id number]    Date of Procedure: 12/28/2022     Pre-Op Diagnosis: Coronary artery disease involving native coronary artery of native heart without angina pectoris [I25.10]    Post-Op Diagnosis: Same as preoperative diagnosis. Procedure(s):  LEFT HEART CATH / CORONARY ANGIOGRAPHY    Surgeon(s):  Michael Chapman MD    Surgical Assistant: None    Anesthesia: Con-Sed     Estimated Blood Loss (mL): Minimal    Complications: None    Specimens: * No specimens in log *     Implants: * No implants in log *    Drains: * No LDAs found *    Findings: Mild CAD. Complete report to follow.   Thanks    Electronically Signed by Nandini Lin MD on 12/28/2022 at 1:33 PM

## 2022-12-28 NOTE — PROGRESS NOTES
Tr band removed without incident,  no active drainage noted, small amt ecchymosis noted around insertion site, but soft to palpate. Area covered with 2x2 and tegaderm. Neuro vasc assessment of right arm and hand WNL.

## 2022-12-28 NOTE — Clinical Note
TRANSFER - IN REPORT:     Verbal report received from: Care Unit Nurse. Report consisted of patient's Situation, Background, Assessment and   Recommendations(SBAR). Opportunity for questions and clarification was provided. Assessment completed upon patient's arrival to unit and care assumed. Patient transported with a Registered Nurse.

## 2022-12-28 NOTE — DISCHARGE INSTRUCTIONS
Cardiac Catheterization/Angiography Discharge Instructions    *Check the puncture site frequently for swelling or bleeding. If you see any bleeding, lie down and apply pressure over the area with a clean town or washcloth. Notify your doctor for any redness, swelling, drainage or oozing from the puncture site. Notify your doctor for any fever or chills. *If the leg or arm with the puncture becomes cold, numb or painful,go to ER    *Activity should be limited for the next 48 hours. Climb stairs as little as possible and avoid any stooping, bending or strenuous activity for 48 hours. No heavy lifting (anything over 10 pounds) for three days. *Do not drive for 48 hours. *You may resume your usual diet. Drink more fluids than usual.    *Have a responsible person drive you home and stay with you for at least 24 hours after your heart catheterization/angiography. *You may remove the bandage from your Right and Arm in 24 hours. You may shower in 24 hours. No tub baths, hot tubs or swimming for one week. Do not place any lotions, creams, powders, ointments over the puncture site for one week. You may place a clean band-aid over the puncture site each day for 5 days. Change this daily. DISCHARGE SUMMARY from Nurse    PATIENT INSTRUCTIONS:    After general anesthesia or intravenous sedation, for 24 hours or while taking prescription Narcotics:  Limit your activities  Do not drive and operate hazardous machinery  Do not make important personal or business decisions  Do  not drink alcoholic beverages  If you have not urinated within 8 hours after discharge, please contact your surgeon on call.     Report the following to your surgeon:  Excessive pain, swelling, redness or odor of or around the surgical area  Temperature over 100.5  Nausea and vomiting lasting longer than 4 hours or if unable to take medications  Any signs of decreased circulation or nerve impairment to extremity: change in color, persistent  numbness, tingling, coldness or increase pain  Any questions    What to do at Home:  Recommended activity: Activity as tolerated and no driving for today,       *  Please give a list of your current medications to your Primary Care Provider. *  Please update this list whenever your medications are discontinued, doses are      changed, or new medications (including over-the-counter products) are added. *  Please carry medication information at all times in case of emergency situations. These are general instructions for a healthy lifestyle:    No smoking/ No tobacco products/ Avoid exposure to second hand smoke  Surgeon General's Warning:  Quitting smoking now greatly reduces serious risk to your health. Obesity, smoking, and sedentary lifestyle greatly increases your risk for illness    A healthy diet, regular physical exercise & weight monitoring are important for maintaining a healthy lifestyle    You may be retaining fluid if you have a history of heart failure or if you experience any of the following symptoms:  Weight gain of 3 pounds or more overnight or 5 pounds in a week, increased swelling in our hands or feet or shortness of breath while lying flat in bed. Please call your doctor as soon as you notice any of these symptoms; do not wait until your next office visit. The discharge information has been reviewed with the patient and spouse. The patient and spouse verbalized understanding. Discharge medications reviewed with the patient and spouse and appropriate educational materials and side effects teaching were provided.   Patient armband removed and shredded    ___________________________________________________________________________________________________________________________________

## 2022-12-28 NOTE — PROGRESS NOTES
Pt. Is all prepped and ready for procedure. 1245 Pt. To cath lab via stretcher. 1330 pt. Received from cath lab, awake and alert. No c/o pain. Refuses food but tolerates fluids without dificulty. Small amount eccymosis noted to puncture site with no hematoma noted. 1430 starting to release TR band per protocol. 1610 pt. Up to bathroom and voided. Pt. Dressed with assistance. No c/o chest pain. D/C instructions given. Pt. D/c'd in c/o  via w/c to home. Pt. In stable condition. Dressing to right radial artery dry and intact with immobilizer in place.

## 2022-12-28 NOTE — H&P
History and Physical    Patient: Rayna Vallejo               Sex: female          DOA: 12/28/2022       YOB: 1970      Age:  47 y.o.        LOS:  LOS: 0 days            HPI:     Rayna Vallejo is a 47 y.o. female with recurrent chest pain, abnormal stress test, mild to moderate CAD. Patient's past medical history is significant for mild to moderate disease on cardiac cath in 2020, anxiety, arthritis, IBS. Patient started having recurrent chest pain at rest and on physical activity from last 1 month. Patient came to the ER AMI was ruled out patient was advised admission and ischemic testing with stress test patient opted for outpatient stress test.  Patient continued to have symptoms of chest pressure at rest and on physical activity. Patient came to the hospital for outpatient exercise nuclear stress test.  Patient have baseline chest pain 3 out of 10 which became worse during exercise without any ischemic EKG changes. No associated arrhythmia also due to known history of mild to moderate CAD with ongoing chest pain patient was advised to have urgent cardiac catheterization possible PCI. Patient agreed to proceed with the procedure.     Past Medical History:   Diagnosis Date    Anxiety     anxiety 1992    Arthritis     Asthma     pt states she does not have asthma    CAD (coronary artery disease) 2020    Chronic pain     Chronic Back Pain     Gastrointestinal disorder     GERD (gastroesophageal reflux disease)     well controlled w/ meds    Hypertension     no medications currently; off [id number]    IBS (irritable bowel syndrome)     Other ill-defined conditions(799.89)     bulging disc    Other ill-defined conditions(799.89)     sinusitis    Tattoo     TATTOO       Current Facility-Administered Medications on File Prior to Encounter   Medication Dose Route Frequency Provider Last Rate Last Admin    [COMPLETED] technetium sestamibi (CARDIOLITE) injection 16.1 millicurie  36.3 millicurie IntraVENous Macie Corbett MD   23.0 millicurie at 11/17/25 2398    [COMPLETED] technetium sestamibi (CARDIOLITE) injection 36 millicurie  36 millicurie IntraVENous ONCE Neelam Laurent MD   36 millicurie at 25/52/92 0529     Current Outpatient Medications on File Prior to Encounter   Medication Sig Dispense Refill    citalopram (CELEXA) 10 mg tablet take 1 tablet by mouth once daily      diclofenac EC (VOLTAREN) 75 mg EC tablet Take 75 mg by mouth two (2) times a day. aspirin delayed-release 81 mg tablet Take  by mouth.      carisoprodoL (SOMA) 250 mg tablet Take 250 mg by mouth every eight (8) hours as needed for Muscle Spasm(s). doxylamine succinate (UNISOM) 25 mg tablet Take 50 mg by mouth nightly as needed. diltiazem HCl (CARDIZEM PO) Take 240 mg by mouth daily. ezetimibe (ZETIA) 10 mg tablet Take  by mouth.      ergocalciferol, vitamin D2, (VITAMIN D2 PO) Take 3,000 mg by mouth daily. cyanocobalamin 1,000 mcg tablet Take 1,000 mcg by mouth daily. Omega-3-DHA-EPA-Fish Oil 1,200 (144-216) mg cap Take  by mouth daily. linaCLOtide (LINZESS) 145 mcg cap capsule Take  by mouth Daily (before breakfast). SYMBICORT 160-4.5 mcg/actuation HFA inhaler inhale 2 puffs by mouth twice a day IN THE MORNING AND EVENING  0    cholecalciferol (VITAMIN D3) (1000 Units /25 mcg) tablet Take 3,000 Units by mouth daily. nitroglycerin (NITROSTAT) 0.4 mg SL tablet by SubLINGual route. HYDROcodone-acetaminophen (XODOL) 7.5-300 mg tablet Take 1 Tablet by mouth four (4) times daily as needed. magnesium 250 mg tab Take  by mouth.          Allergies   Allergen Reactions    Percocet [Oxycodone-Acetaminophen] Nausea and Vomiting    Advair Diskus [Fluticasone Propion-Salmeterol] Nausea and Vomiting    Benadr [Diphenhydramine Hcl] Shortness of Breath     CP    Ibuprofen Nausea and Vomiting    Tylenol [Acetaminophen] Other (comments)     Causes upset stomach         Social History:   Tobacco use: none   Alcohol use: none       Family History: +      Review of Systems    Constitutional:  No fever or weight loss  HEENT:  No headache or visual changes  Cardiovascular:  ++chest pain or diaphoresis  Respiratory:  No coughing, wheezing, or shortness of breath. GI:  No nausea or vomitting. No diarrhea  :  No hematuria or dysuria  Skin:  No rashes or moles  Neuro:  No seizures or syncope  Hematological:  No bruising or bleeding  Endocrine:  No diabetes or thyroid disease    Physical Exam:      Visit Vitals  BP (!) 170/82 (BP 1 Location: Left leg, BP Patient Position: At rest)   Pulse 86   Temp 97.9 °F (36.6 °C)   Resp 20   Ht 5' 4\" (1.626 m)   Wt 82.1 kg (181 lb)   SpO2 98%   Breastfeeding No   BMI 31.07 kg/m²       Physical Exam:    General Appearance: Comfortable, not using accessory muscles of respiration. HEENT: ROXANNA. HEAD: Atraumatic  NECK: No JVD, no thyroidomeglay. CAROTIDS:  LUNGS: Clear bilaterally. HEART: S1+S2 audible, no murmur, no pericardial rub. ABD: Non-tender, BS Audible    EXT: No adema, and no cysnosis. VASCULAR EXAM: Pulses are intact. PSYCHIATRIC EXAM: Mood is appropriate. MUSCULOSKELETAL: Grossly no joint deformity. Home Medications:    Prior to Admission Medications   Prescriptions Last Dose Informant Patient Reported? Taking? HYDROcodone-acetaminophen (XODOL) 7.5-300 mg tablet 12/26/2022 at 1600  Yes No   Sig: Take 1 Tablet by mouth four (4) times daily as needed. Omega-3-DHA-EPA-Fish Oil 1,200 (144-216) mg cap 12/27/2022 at 0900  Yes Yes   Sig: Take  by mouth daily. SYMBICORT 160-4.5 mcg/actuation HFA inhaler 11/28/2022  Yes Yes   Sig: inhale 2 puffs by mouth twice a day IN THE MORNING AND EVENING   aspirin delayed-release 81 mg tablet 12/28/2022 at 0630  Yes Yes   Sig: Take  by mouth.   carisoprodoL (SOMA) 250 mg tablet 12/27/2022 at 1600  Yes Yes   Sig: Take 250 mg by mouth every eight (8) hours as needed for Muscle Spasm(s). cholecalciferol (VITAMIN D3) (1000 Units /25 mcg) tablet 12/28/2022 at 0630 Self Yes Yes   Sig: Take 3,000 Units by mouth daily. citalopram (CELEXA) 10 mg tablet 12/28/2022 at 0630  Yes Yes   Sig: take 1 tablet by mouth once daily   cyanocobalamin 1,000 mcg tablet 12/27/2022 at 0630  Yes Yes   Sig: Take 1,000 mcg by mouth daily. diclofenac EC (VOLTAREN) 75 mg EC tablet 12/27/2022 at 1600  Yes Yes   Sig: Take 75 mg by mouth two (2) times a day. diltiazem HCl (CARDIZEM PO) 12/28/2022 at 0630  Yes Yes   Sig: Take 240 mg by mouth daily. doxylamine succinate (UNISOM) 25 mg tablet 12/27/2022 at 2000  Yes Yes   Sig: Take 50 mg by mouth nightly as needed. ergocalciferol, vitamin D2, (VITAMIN D2 PO) 12/28/2022 at 0630  Yes Yes   Sig: Take 3,000 mg by mouth daily. ezetimibe (ZETIA) 10 mg tablet 12/28/2022 at 0630  Yes Yes   Sig: Take  by mouth.   linaCLOtide (LINZESS) 145 mcg cap capsule 12/27/2022 at 0900  Yes Yes   Sig: Take  by mouth Daily (before breakfast). magnesium 250 mg tab 12/26/2022 at 0900  Yes No   Sig: Take  by mouth. nitroglycerin (NITROSTAT) 0.4 mg SL tablet 12/22/2022 at 0800  Yes No   Sig: by SubLINGual route. Facility-Administered Medications: None       Current Medications:      Current Facility-Administered Medications:     0.9% sodium chloride infusion, 25 mL/hr, IntraVENous, CONTINUOUS, Piotr Bernal MD, Last Rate: 25 mL/hr at 12/28/22 1149, 25 mL/hr at 12/28/22 1149    Laboratory Studies: All lab results for the last 24 hours reviewed.     RADIOLOGY:  CT Results  (Last 48 hours)      None          CXR Results  (Last 48 hours)      None              Cardiology Procedures:   Results for orders placed or performed during the hospital encounter of 12/22/22   EKG, 12 LEAD, INITIAL   Result Value Ref Range    Ventricular Rate 97 BPM    Atrial Rate 97 BPM    P-R Interval 152 ms    QRS Duration 82 ms    Q-T Interval 338 ms    QTC Calculation (Bezet) 429 ms    Calculated P Axis 46 degrees    Calculated R Axis 78 degrees    Calculated T Axis 55 degrees    Diagnosis       Normal sinus rhythm  Borderline ECG  When compared with ECG of 27-OCT-2020 09:17,  No significant change was found  Confirmed by Jesus Rios MD. ([id number]) on 12/25/2022 9:48:28 PM        Echo Results  (Last 48 hours)      None         Cardiolite (Tc-99m Sestamibi) stress test    Equivocal  stress test due to ongoing chest pain during the stress test and worsening of chest pain during stress test    Assessment/Plan     Recurrent chest pain  Possible accelerated angina  Abnormal/equivocal exercise nuclear stress test  Known history of CAD, mild to moderate  Anxiety  Arthritis    PLAN:    Due to recurrent chest pain possible accelerated angina symptom and equivocal stress test with known history of CAD. I have recommended cardiac catheterization possible PCI. Risk, benefits and alternatives were discussed in detail with the patient and she agreed to proceed. Rest of the recommendation after cardiac catheterization.

## 2022-12-28 NOTE — Clinical Note
Contrast Dose Calculator:   Patient's age: 47.   Patient's sex: Female. Patient weight (kg) = 82.1. Creatinine level (mg/dL) = 0.89. Creatinine clearance (mL/min): 94.75. Max Contrast dose per Creatinine Cl calculator = 213.18 mL.

## 2022-12-29 ENCOUNTER — PATIENT OUTREACH (OUTPATIENT)
Dept: OTHER | Age: 52
End: 2022-12-29

## 2022-12-29 NOTE — PROGRESS NOTES
Patient identified as eligible for [address] [place] [address] services. Second telephone outreach attempted. Left discreet voicemail with this CM confidential contact information. UTR letter sent via My Chart.

## 2022-12-29 NOTE — LETTER
12/29/2022 11:21 AM    Ms. Goyo Mosquera [phone]        Dear Eben Joshua,    My name is Andre Russ RN, Associate Care Manager for [address] [place] [address] and I have been trying to reach you. The Associate Care Management (ACM) program is a free-of-charge confidential service provided to our associates and their family members covered by the [facility]. The program will provide an associate and his/her family with the [address] expertise to assist in navigating the health care delivery system, provider services, and their overall care needs--so as to assure and improve health care interactions and enhance the quality of life. This program is designed to provide you with the opportunity to have a [employer] partner with you for the following services:     1) when you come home from the hospital or emergency room   2) when help is needed to manage your disease   3) when you need assistance coordinating services or appointments  4) when you need additional education, resources or assistance reaching your Be Well Health Program goals/requirements such as Be Well With Diabetes      [address] [place] [address] is dedicated to empowering the good health of its community and improving the quality of care and care experiences for associates and their families. We are committed to safeguarding patient confidentiality and privacy, assuring that every associate has the respect he or she deserves in managing their health. The information shared with your care manager will not be shared with anyone else aside from those you identify as part of your care team, and will only be used to assist you with any identified care needs. Please contact me if you would like this service provided to you. Sincerely,      LUCI Lowery  Associate Care Manager  [address] [place] [id number]  W [phone]  F [phone]  [email or web address]  [email or web address] email: [email or web address]

## 2023-01-19 ENCOUNTER — PATIENT OUTREACH (OUTPATIENT)
Dept: OTHER | Age: 53
End: 2023-01-19

## 2023-01-19 NOTE — LETTER
1/19/2023 9:02 AM    Ms. [place] Mosquera [phone]        Dear Lilibeth Bonds,    My name is Vic Mckeon, Associate Care Manager for [facility], and I have been trying to reach you. The Associate Care Management (ACM) program is a free-of-charge, confidential service provided to our associates and their family members covered by the [facility]. I can help you with care transitions such as when you come home from the hospital, when help is needed to manage your disease, or when you need assistance coordinating services or appointments. As healthcare providers, we know that patients do better when they have close follow up with a primary care provider (PCP). I can help you find one that is convenient to you and covered by your insurance. I can also help you understand any after visit instructions, such as what symptoms to watch out for, or any new or changed medications. We can work together using your preferred communication -- telephone, email, MyChart. If you do not have a [employer] account, I can help you request access. Our program is designed to provide you with the opportunity to have a [facility] partner with you for your healthcare needs. Due to not being able to reach you, I am closing out the current program, but will remain available to you should you have any questions. Please contact me at the below number if I can provide you with assistance for any of the above services. Prema Covarrubias RN, [address] [place] [address] [phone]   [phone]  [email or web address]  [facility] email: [email or web address]

## 2023-01-25 ENCOUNTER — TRANSCRIBE ORDER (OUTPATIENT)
Dept: SCHEDULING | Age: 53
End: 2023-01-25

## 2023-01-25 DIAGNOSIS — M48.02 SPINAL STENOSIS IN CERVICAL REGION: Primary | ICD-10-CM

## 2023-02-01 DIAGNOSIS — M48.02 SPINAL STENOSIS IN CERVICAL REGION: Primary | ICD-10-CM

## 2023-02-05 DIAGNOSIS — M48.02 SPINAL STENOSIS IN CERVICAL REGION: Primary | ICD-10-CM

## 2023-02-09 ENCOUNTER — HOSPITAL ENCOUNTER (OUTPATIENT)
Dept: MRI IMAGING | Age: 53
Discharge: HOME | End: 2023-02-09
Attending: PSYCHIATRY & NEUROLOGY
Payer: COMMERCIAL

## 2023-02-09 DIAGNOSIS — M48.02 SPINAL STENOSIS IN CERVICAL REGION: ICD-10-CM

## 2023-02-09 PROCEDURE — 72141 MRI NECK SPINE W/O DYE: CPT

## 2023-03-02 ENCOUNTER — HOSPITAL ENCOUNTER (OUTPATIENT)
Facility: HOSPITAL | Age: 53
Discharge: HOME | End: 2023-03-02
Payer: COMMERCIAL

## 2023-03-02 DIAGNOSIS — E78.2 MIXED HYPERLIPIDEMIA: ICD-10-CM

## 2023-03-02 LAB
ALBUMIN SERPL-MCNC: 3.7 G/DL (ref 3.4–5)
ALBUMIN/GLOB SERPL: 1.2 (ref 0.8–1.7)
ALP SERPL-CCNC: 65 U/L (ref 45–117)
ALT SERPL-CCNC: 37 U/L (ref 13–56)
ANION GAP SERPL CALC-SCNC: 5 MMOL/L (ref 3–18)
AST SERPL-CCNC: 23 U/L (ref 10–38)
BILIRUB SERPL-MCNC: 0.3 MG/DL (ref 0.2–1)
BUN SERPL-MCNC: 13 MG/DL (ref 7–18)
BUN/CREAT SERPL: 15 (ref 12–20)
CALCIUM SERPL-MCNC: 8.8 MG/DL (ref 8.5–10.1)
CHLORIDE SERPL-SCNC: 110 MMOL/L (ref 100–111)
CHOLEST SERPL-MCNC: 181 MG/DL
CO2 SERPL-SCNC: 27 MMOL/L (ref 21–32)
CREAT SERPL-MCNC: 0.88 MG/DL (ref 0.6–1.3)
ERYTHROCYTE [DISTWIDTH] IN BLOOD BY AUTOMATED COUNT: 13.6 % (ref 11.6–14.5)
EST. AVERAGE GLUCOSE BLD GHB EST-MCNC: 91 MG/DL
GLOBULIN SER CALC-MCNC: 3.1 G/DL (ref 2–4)
GLUCOSE SERPL-MCNC: 95 MG/DL (ref 74–99)
HBA1C MFR BLD: 4.8 % (ref 4.2–5.6)
HCT VFR BLD AUTO: 36.2 % (ref 35–45)
HDLC SERPL-MCNC: 80 MG/DL (ref 40–60)
HDLC SERPL: 2.3 (ref 0–5)
HGB BLD-MCNC: 12.2 G/DL (ref 12–16)
LDLC SERPL CALC-MCNC: 79.2 MG/DL (ref 0–100)
LIPID PANEL: ABNORMAL
MCH RBC QN AUTO: 31.6 PG (ref 24–34)
MCHC RBC AUTO-ENTMCNC: 33.7 G/DL (ref 31–37)
MCV RBC AUTO: 93.8 FL (ref 78–100)
NRBC # BLD: 0 K/UL (ref 0–0.01)
NRBC BLD-RTO: 0 PER 100 WBC
PLATELET # BLD AUTO: 249 K/UL (ref 135–420)
PMV BLD AUTO: 9 FL (ref 9.2–11.8)
POTASSIUM SERPL-SCNC: 4.5 MMOL/L (ref 3.5–5.5)
PROT SERPL-MCNC: 6.8 G/DL (ref 6.4–8.2)
RBC # BLD AUTO: 3.86 M/UL (ref 4.2–5.3)
SODIUM SERPL-SCNC: 142 MMOL/L (ref 136–145)
TRIGL SERPL-MCNC: 109 MG/DL
VLDLC SERPL CALC-MCNC: 21.8 MG/DL
WBC # BLD AUTO: 5.3 K/UL (ref 4.6–13.2)

## 2023-03-02 PROCEDURE — 80053 COMPREHEN METABOLIC PANEL: CPT

## 2023-03-02 PROCEDURE — 85027 COMPLETE CBC AUTOMATED: CPT

## 2023-03-02 PROCEDURE — 83036 HEMOGLOBIN GLYCOSYLATED A1C: CPT

## 2023-03-02 PROCEDURE — 80061 LIPID PANEL: CPT

## 2023-03-07 LAB
FAX TO NUMBER: NORMAL
TEST RESULTS FAXED TO: NORMAL

## 2023-04-24 RX ORDER — DILTIAZEM HYDROCHLORIDE 240 MG/1
240 CAPSULE, COATED, EXTENDED RELEASE ORAL DAILY
COMMUNITY

## 2023-04-24 RX ORDER — ALPRAZOLAM 0.5 MG/1
0.5 TABLET ORAL AS NEEDED
COMMUNITY
Start: 2016-08-04

## 2023-04-24 RX ORDER — MAGNESIUM 30 MG
250 TABLET ORAL 2 TIMES DAILY
COMMUNITY

## 2023-04-24 RX ORDER — LANOLIN ALCOHOL/MO/W.PET/CERES
1000 CREAM (GRAM) TOPICAL DAILY
COMMUNITY

## 2023-04-24 RX ORDER — CETIRIZINE HYDROCHLORIDE 10 MG/1
10 TABLET ORAL DAILY
COMMUNITY

## 2023-04-24 RX ORDER — AMOXICILLIN 500 MG
CAPSULE ORAL
COMMUNITY

## 2023-05-01 ENCOUNTER — ANESTHESIA EVENT (OUTPATIENT)
Facility: HOSPITAL | Age: 53
End: 2023-05-01
Payer: COMMERCIAL

## 2023-05-02 ENCOUNTER — ANESTHESIA (OUTPATIENT)
Facility: HOSPITAL | Age: 53
End: 2023-05-02
Payer: COMMERCIAL

## 2023-05-02 ENCOUNTER — HOSPITAL ENCOUNTER (OUTPATIENT)
Facility: HOSPITAL | Age: 53
Setting detail: OUTPATIENT SURGERY
Discharge: HOME | End: 2023-05-02
Attending: INTERNAL MEDICINE | Admitting: INTERNAL MEDICINE
Payer: COMMERCIAL

## 2023-05-02 VITALS
WEIGHT: 176.6 LBS | DIASTOLIC BLOOD PRESSURE: 79 MMHG | HEART RATE: 78 BPM | BODY MASS INDEX: 30.31 KG/M2 | TEMPERATURE: 97.6 F | OXYGEN SATURATION: 100 % | RESPIRATION RATE: 19 BRPM | SYSTOLIC BLOOD PRESSURE: 119 MMHG

## 2023-05-02 PROCEDURE — 2580000003 HC RX 258: Performed by: NURSE ANESTHETIST, CERTIFIED REGISTERED

## 2023-05-02 PROCEDURE — 88305 TISSUE EXAM BY PATHOLOGIST: CPT

## 2023-05-02 PROCEDURE — 3700000001 HC ADD 15 MINUTES (ANESTHESIA): Performed by: INTERNAL MEDICINE

## 2023-05-02 PROCEDURE — 3700000000 HC ANESTHESIA ATTENDED CARE: Performed by: INTERNAL MEDICINE

## 2023-05-02 PROCEDURE — 2709999900 HC NON-CHARGEABLE SUPPLY: Performed by: INTERNAL MEDICINE

## 2023-05-02 PROCEDURE — 6370000000 HC RX 637 (ALT 250 FOR IP): Performed by: INTERNAL MEDICINE

## 2023-05-02 PROCEDURE — 7100000010 HC PHASE II RECOVERY - FIRST 15 MIN: Performed by: INTERNAL MEDICINE

## 2023-05-02 PROCEDURE — 3600007502: Performed by: INTERNAL MEDICINE

## 2023-05-02 PROCEDURE — 00812 ANES LWR INTST SCR COLSC: CPT | Performed by: ANESTHESIOLOGY

## 2023-05-02 PROCEDURE — 3600007512: Performed by: INTERNAL MEDICINE

## 2023-05-02 PROCEDURE — 6360000002 HC RX W HCPCS: Performed by: ANESTHESIOLOGY

## 2023-05-02 PROCEDURE — 7100000001 HC PACU RECOVERY - ADDTL 15 MIN: Performed by: INTERNAL MEDICINE

## 2023-05-02 PROCEDURE — 7100000000 HC PACU RECOVERY - FIRST 15 MIN: Performed by: INTERNAL MEDICINE

## 2023-05-02 RX ORDER — FAMOTIDINE 20 MG/1
20 TABLET, FILM COATED ORAL ONCE
Status: DISCONTINUED | OUTPATIENT
Start: 2023-05-02 | End: 2023-05-02

## 2023-05-02 RX ORDER — LIDOCAINE HYDROCHLORIDE 10 MG/ML
1 INJECTION, SOLUTION EPIDURAL; INFILTRATION; INTRACAUDAL; PERINEURAL
Status: DISCONTINUED | OUTPATIENT
Start: 2023-05-02 | End: 2023-05-02 | Stop reason: HOSPADM

## 2023-05-02 RX ORDER — PROPOFOL 10 MG/ML
INJECTION, EMULSION INTRAVENOUS PRN
Status: DISCONTINUED | OUTPATIENT
Start: 2023-05-02 | End: 2023-05-02 | Stop reason: SDUPTHER

## 2023-05-02 RX ORDER — SODIUM CHLORIDE, SODIUM LACTATE, POTASSIUM CHLORIDE, CALCIUM CHLORIDE 600; 310; 30; 20 MG/100ML; MG/100ML; MG/100ML; MG/100ML
INJECTION, SOLUTION INTRAVENOUS CONTINUOUS
Status: DISCONTINUED | OUTPATIENT
Start: 2023-05-02 | End: 2023-05-02 | Stop reason: HOSPADM

## 2023-05-02 RX ORDER — SIMETHICONE 20 MG/.3ML
EMULSION ORAL PRN
Status: DISCONTINUED | OUTPATIENT
Start: 2023-05-02 | End: 2023-05-02 | Stop reason: ALTCHOICE

## 2023-05-02 RX ADMIN — PROPOFOL 100 MG: 10 INJECTION, EMULSION INTRAVENOUS at 11:16

## 2023-05-02 RX ADMIN — SODIUM CHLORIDE, SODIUM LACTATE, POTASSIUM CHLORIDE, AND CALCIUM CHLORIDE: 600; 310; 30; 20 INJECTION, SOLUTION INTRAVENOUS at 11:15

## 2023-05-02 RX ADMIN — SODIUM CHLORIDE, SODIUM LACTATE, POTASSIUM CHLORIDE, AND CALCIUM CHLORIDE: 600; 310; 30; 20 INJECTION, SOLUTION INTRAVENOUS at 10:27

## 2023-05-02 RX ADMIN — PROPOFOL 50 MG: 10 INJECTION, EMULSION INTRAVENOUS at 11:27

## 2023-05-02 RX ADMIN — PROPOFOL 100 MG: 10 INJECTION, EMULSION INTRAVENOUS at 11:19

## 2023-05-02 RX ADMIN — PROPOFOL 100 MG: 10 INJECTION, EMULSION INTRAVENOUS at 11:35

## 2023-05-02 RX ADMIN — PROPOFOL 100 MG: 10 INJECTION, EMULSION INTRAVENOUS at 11:14

## 2023-05-02 RX ADMIN — PROPOFOL 100 MG: 10 INJECTION, EMULSION INTRAVENOUS at 11:45

## 2023-05-02 RX ADMIN — PROPOFOL 100 MG: 10 INJECTION, EMULSION INTRAVENOUS at 11:23

## 2023-05-02 RX ADMIN — PROPOFOL 50 MG: 10 INJECTION, EMULSION INTRAVENOUS at 11:30

## 2023-05-02 RX ADMIN — PROPOFOL 100 MG: 10 INJECTION, EMULSION INTRAVENOUS at 11:40

## 2023-05-02 ASSESSMENT — PAIN - FUNCTIONAL ASSESSMENT: PAIN_FUNCTIONAL_ASSESSMENT: 0-10

## 2023-05-02 NOTE — ANESTHESIA PRE PROCEDURE
Department of Anesthesiology  Preprocedure Note       Name:  Edison Bobo   Age:  47 y.o.  :  1970                                          MRN:  [id number]         Date:  2023      Surgeon: Yobany Hayes):  Rock Rubinstein, MD    Procedure: Procedure(s):  COLONOSCOPY    Medications prior to admission:   Prior to Admission medications    Medication Sig Start Date End Date Taking? Authorizing Provider   ALPRAZolam Vanessa Tam) 0.5 MG tablet 1 tablet as needed. 16  Yes Historical Provider, MD   magnesium 30 MG tablet Take 250 mg by mouth 2 times daily   Yes Historical Provider, MD   vitamin B-12 (CYANOCOBALAMIN) 1000 MCG tablet Take 1 tablet by mouth daily   Yes Historical Provider, MD   Omega-3 Fatty Acids (FISH OIL) 1200 MG CAPS Take by mouth   Yes Historical Provider, MD   dilTIAZem (CARDIZEM CD) 240 MG extended release capsule Take 1 capsule by mouth daily    Historical Provider, MD   cetirizine (ZYRTEC) 10 MG tablet Take 1 tablet by mouth daily    Historical Provider, MD   aspirin 81 MG EC tablet Take by mouth 11/3/20   Ar Automatic Reconciliation   budesonide-formoterol (SYMBICORT) 160-4.5 MCG/ACT AERO inhale 2 puffs by mouth twice a day IN THE MORNING AND EVENING 7/15/17   Ar Automatic Reconciliation   carisoprodol (SOMA) 250 MG tablet Take 1 tablet by mouth every 8 hours as needed. Ar Automatic Reconciliation   vitamin D (CHOLECALCIFEROL) 25 MCG (1000 UT) TABS tablet Take 3 tablets by mouth daily    Ar Automatic Reconciliation   cyanocobalamin 1000 MCG tablet Take 1 tablet by mouth daily    Ar Automatic Reconciliation   doxyLAMINE succinate (GNP SLEEP AID) 25 MG tablet Take 2 tablets by mouth    Ar Automatic Reconciliation   ezetimibe (ZETIA) 10 MG tablet Take by mouth    Ar Automatic Reconciliation   HYDROcodone-acetaminophen 7.5-300 MG TABS Take 1 tablet by mouth 4 times daily as needed.     Ar Automatic Reconciliation   linaclotide (LINZESS) 145 MCG capsule Take 72 mcg by mouth every

## 2023-05-02 NOTE — ANESTHESIA POSTPROCEDURE EVALUATION
Department of Anesthesiology  Postprocedure Note    Patient: Darrel Cortes  MRN: [id number]  YOB: 1970  Date of evaluation: 5/2/2023      Procedure Summary     Date: 05/02/23 Room / Location: [facility] ENDO 03 / [facility] END[facility]COPY    Anesthesia Start: 1110 Anesthesia Stop: 1155    Procedure: COLONOSCOPY with Polypectomies (Abdomen) Diagnosis:       Overweight      Bloating symptom      Gastroesophageal reflux disease, unspecified whether esophagitis present      Chronic idiopathic constipation      Personal history of colonic polyps      (Overweight [E66.3])      (Bloating symptom [R14.0])      (Gastroesophageal reflux disease, unspecified whether esophagitis present [K21.9])      (Chronic idiopathic constipation [K59.04])      (Personal history of colonic polyps [Z86.010])    Surgeons: Yifan Pierre MD Responsible Provider: Selin Quezada DO    Anesthesia Type: MAC ASA Status: 3          Anesthesia Type: MAC    Sergo Phase I: Sergo Score: 10    Sergo Phase II: Sergo Score: 10      Anesthesia Post Evaluation    Patient location during evaluation: PACU  Patient participation: complete - patient participated  Level of consciousness: awake and alert  Airway patency: patent  Nausea & Vomiting: no nausea and no vomiting  Complications: no  Cardiovascular status: hemodynamically stable  Respiratory status: acceptable  Hydration status: stable  Multimodal analgesia pain management approach

## 2023-05-02 NOTE — H&P
[email or web address]  [phone]      History and Physical    Patient: Kacie Hughes MRN: [id number]  SSN: [id number]    YOB: 1970  Age: 47 y.o. Sex: female      Subjective:      Kacie Hughes is a 47 y.o. female who presents for increased risk CRCS. Pt has a personal history of colon polyps. Denies symptoms or concerns. .     Past Medical History:   Diagnosis Date    Anxiety     Asthma     CAD (coronary artery disease)     High cholesterol     Hypertension      Past Surgical History:   Procedure Laterality Date    CHOLECYSTECTOMY      PARTIAL HYSTERECTOMY (CERVIX NOT REMOVED)        History reviewed. No pertinent family history. Social History     Tobacco Use    Smoking status: Former     Types: Cigarettes     Quit date: 2019     Years since quittin.3    Smokeless tobacco: Never   Substance Use Topics    Alcohol use: Yes     Alcohol/week: 1.0 standard drink     Types: 1 Glasses of wine per week     Comment: occ      Prior to Admission medications    Medication Sig Start Date End Date Taking? Authorizing Provider   ALPRONNYZopiedad Bang) 0.5 MG tablet 1 tablet as needed.  16  Yes Historical Provider, MD   magnesium 30 MG tablet Take 250 mg by mouth 2 times daily   Yes Historical Provider, MD   vitamin B-12 (CYANOCOBALAMIN) 1000 MCG tablet Take 1 tablet by mouth daily   Yes Historical Provider, MD   Omega-3 Fatty Acids (FISH OIL) 1200 MG CAPS Take by mouth   Yes Historical Provider, MD   dilTIAZem (CARDIZEM CD) 240 MG extended release capsule Take 1 capsule by mouth daily    Historical Provider, MD   cetirizine (ZYRTEC ALLERGY) 10 MG tablet Take 1 tablet by mouth daily    Historical Provider, MD   aspirin 81 MG EC tablet Take by mouth 11/3/20   Ar Automatic Reconciliation   budesonide-formoterol (SYMBICORT) 160-4.5 MCG/ACT AERO inhale 2 puffs by mouth twice a day IN THE MORNING AND EVENING 7/15/17   Ar Automatic Reconciliation   carisoprodol (SOMA) 250 MG tablet Take 250 mg by mouth every

## 2023-05-02 NOTE — DISCHARGE INSTRUCTIONS
Colon Polyps: Care Instructions  Your Care Instructions     Colon polyps are growths in the colon or the rectum. The cause of most colon polyps is not known, and most people who get them do not have any problems. But a certain kind can turn into cancer. For this reason, regular testing for colon polyps is important for people as they get older. It is also important for anyone who has an increased risk for colon cancer. Polyps are usually found through routine colon cancer screening tests. Although most colon polyps are not cancerous, they are usually removed and then tested for cancer. Screening for colon cancer saves lives because the cancer can usually be cured if it is caught early. If you have a polyp that is the type that can turn into cancer, you may need more tests to examine your entire colon. The doctor will remove any other polyps that are found, and you will be tested more often. Follow-up care is a key part of your treatment and safety. Be sure to make and go to all appointments, and call your doctor if you are having problems. It's also a good idea to know your test results and keep a list of the medicines you take. How can you care for yourself at home? Regular exams to look for colon polyps are the best way to prevent polyps from turning into colon cancer. These can include stool tests, sigmoidoscopy, colonoscopy, and CT colonography. Talk with your doctor about a testing schedule that is right for you. To prevent polyps  There is no home treatment that can prevent colon polyps. But these steps may help lower your risk for cancer. Stay active. Being active can help you get to and stay at a healthy weight. Try to exercise on most days of the week. Walking is a good choice. Eat well. Choose a variety of vegetables, fruits, legumes (such as peas and beans), fish, poultry, and whole grains. Do not smoke.  If you need help quitting, talk to your doctor about stop-smoking programs and

## 2023-05-18 LAB
CHOLEST SERPL-MCNC: 178 MG/DL
GLUCOSE SERPL-MCNC: 104 MG/DL (ref 74–99)
HDLC SERPL-MCNC: 75 MG/DL (ref 40–60)
LDLC SERPL CALC-MCNC: 84 MG/DL (ref 0–100)
TRIGL SERPL-MCNC: 95 MG/DL (ref ?–150)

## 2023-10-05 ENCOUNTER — HOSPITAL ENCOUNTER (OUTPATIENT)
Facility: HOSPITAL | Age: 53
Discharge: HOME | End: 2023-10-08

## 2023-10-05 ENCOUNTER — HOSPITAL ENCOUNTER (OUTPATIENT)
Facility: HOSPITAL | Age: 53
Discharge: HOME | End: 2023-10-05
Payer: COMMERCIAL

## 2023-10-05 ENCOUNTER — TRANSCRIBE ORDERS (OUTPATIENT)
Facility: HOSPITAL | Age: 53
End: 2023-10-05

## 2023-10-05 DIAGNOSIS — M20.21 HALLUX RIGIDUS OF RIGHT FOOT: Primary | ICD-10-CM

## 2023-10-05 DIAGNOSIS — M19.071 ARTHRITIS OF RIGHT ANKLE: ICD-10-CM

## 2023-10-05 DIAGNOSIS — M20.21 HALLUX RIGIDUS OF RIGHT FOOT: ICD-10-CM

## 2023-10-05 LAB — LABCORP SPECIMEN COLLECTION: NORMAL

## 2023-10-05 PROCEDURE — 93005 ELECTROCARDIOGRAM TRACING: CPT

## 2023-10-06 LAB
EKG ATRIAL RATE: 92 BPM
EKG DIAGNOSIS: NORMAL
EKG P AXIS: 62 DEGREES
EKG P-R INTERVAL: 152 MS
EKG Q-T INTERVAL: 342 MS
EKG QRS DURATION: 84 MS
EKG QTC CALCULATION (BAZETT): 422 MS
EKG R AXIS: 78 DEGREES
EKG T AXIS: 68 DEGREES
EKG VENTRICULAR RATE: 92 BPM

## 2023-10-19 ENCOUNTER — HOSPITAL ENCOUNTER (OUTPATIENT)
Facility: HOSPITAL | Age: 53
Discharge: HOME | End: 2023-10-19
Payer: COMMERCIAL

## 2023-10-19 ENCOUNTER — TRANSCRIBE ORDERS (OUTPATIENT)
Facility: HOSPITAL | Age: 53
End: 2023-10-19

## 2023-10-19 DIAGNOSIS — J45.901 ASTHMA WITH ACUTE EXACERBATION, UNSPECIFIED ASTHMA SEVERITY, UNSPECIFIED WHETHER PERSISTENT: Primary | ICD-10-CM

## 2023-10-19 DIAGNOSIS — J45.901 ASTHMA WITH ACUTE EXACERBATION, UNSPECIFIED ASTHMA SEVERITY, UNSPECIFIED WHETHER PERSISTENT: ICD-10-CM

## 2023-10-19 PROCEDURE — 71046 X-RAY EXAM CHEST 2 VIEWS: CPT

## 2023-11-09 ENCOUNTER — ANESTHESIA EVENT (OUTPATIENT)
Facility: HOSPITAL | Age: 53
End: 2023-11-09
Payer: COMMERCIAL

## 2023-11-09 RX ORDER — FENTANYL CITRATE 50 UG/ML
25 INJECTION, SOLUTION INTRAMUSCULAR; INTRAVENOUS EVERY 5 MIN PRN
Status: CANCELLED | OUTPATIENT
Start: 2023-11-09

## 2023-11-09 RX ORDER — SODIUM CHLORIDE, SODIUM LACTATE, POTASSIUM CHLORIDE, CALCIUM CHLORIDE 600; 310; 30; 20 MG/100ML; MG/100ML; MG/100ML; MG/100ML
INJECTION, SOLUTION INTRAVENOUS CONTINUOUS
Status: CANCELLED | OUTPATIENT
Start: 2023-11-09

## 2023-11-09 RX ORDER — SODIUM CHLORIDE 0.9 % (FLUSH) 0.9 %
5-40 SYRINGE (ML) INJECTION PRN
Status: CANCELLED | OUTPATIENT
Start: 2023-11-09

## 2023-11-09 RX ORDER — MEPERIDINE HYDROCHLORIDE 50 MG/ML
12.5 INJECTION INTRAMUSCULAR; INTRAVENOUS; SUBCUTANEOUS ONCE
Status: CANCELLED | OUTPATIENT
Start: 2023-11-09 | End: 2023-11-09

## 2023-11-09 RX ORDER — SODIUM CHLORIDE 9 MG/ML
INJECTION, SOLUTION INTRAVENOUS PRN
Status: CANCELLED | OUTPATIENT
Start: 2023-11-09

## 2023-11-09 RX ORDER — LABETALOL HYDROCHLORIDE 5 MG/ML
10 INJECTION, SOLUTION INTRAVENOUS
Status: CANCELLED | OUTPATIENT
Start: 2023-11-09

## 2023-11-09 RX ORDER — SODIUM CHLORIDE 0.9 % (FLUSH) 0.9 %
5-40 SYRINGE (ML) INJECTION EVERY 12 HOURS SCHEDULED
Status: CANCELLED | OUTPATIENT
Start: 2023-11-09

## 2023-11-09 RX ORDER — LORAZEPAM 2 MG/ML
0.5 INJECTION INTRAMUSCULAR ONCE
Status: CANCELLED | OUTPATIENT
Start: 2023-11-09 | End: 2023-11-09

## 2023-11-09 RX ORDER — DROPERIDOL 2.5 MG/ML
0.62 INJECTION, SOLUTION INTRAMUSCULAR; INTRAVENOUS
Status: CANCELLED | OUTPATIENT
Start: 2023-11-09 | End: 2023-11-10

## 2023-11-09 RX ORDER — HYDRALAZINE HYDROCHLORIDE 20 MG/ML
10 INJECTION INTRAMUSCULAR; INTRAVENOUS
Status: CANCELLED | OUTPATIENT
Start: 2023-11-09

## 2023-11-09 RX ORDER — HYDROMORPHONE HYDROCHLORIDE 1 MG/ML
0.5 INJECTION, SOLUTION INTRAMUSCULAR; INTRAVENOUS; SUBCUTANEOUS EVERY 5 MIN PRN
Status: CANCELLED | OUTPATIENT
Start: 2023-11-09

## 2023-11-10 ENCOUNTER — APPOINTMENT (OUTPATIENT)
Facility: HOSPITAL | Age: 53
End: 2023-11-10
Attending: PODIATRIST
Payer: COMMERCIAL

## 2023-11-10 ENCOUNTER — HOSPITAL ENCOUNTER (OUTPATIENT)
Facility: HOSPITAL | Age: 53
Setting detail: OUTPATIENT SURGERY
Discharge: HOME | End: 2023-11-10
Attending: PODIATRIST | Admitting: PODIATRIST
Payer: COMMERCIAL

## 2023-11-10 ENCOUNTER — ANESTHESIA (OUTPATIENT)
Facility: HOSPITAL | Age: 53
End: 2023-11-10
Payer: COMMERCIAL

## 2023-11-10 VITALS
BODY MASS INDEX: 30.63 KG/M2 | RESPIRATION RATE: 16 BRPM | OXYGEN SATURATION: 100 % | DIASTOLIC BLOOD PRESSURE: 71 MMHG | SYSTOLIC BLOOD PRESSURE: 136 MMHG | HEIGHT: 64 IN | WEIGHT: 179.4 LBS | TEMPERATURE: 98.4 F | HEART RATE: 89 BPM

## 2023-11-10 DIAGNOSIS — G89.18 POST-OP PAIN: Primary | ICD-10-CM

## 2023-11-10 PROCEDURE — 6370000000 HC RX 637 (ALT 250 FOR IP): Performed by: ANESTHESIOLOGY

## 2023-11-10 PROCEDURE — 2580000003 HC RX 258: Performed by: PODIATRIST

## 2023-11-10 PROCEDURE — 6360000002 HC RX W HCPCS: Performed by: PODIATRIST

## 2023-11-10 PROCEDURE — 7100000010 HC PHASE II RECOVERY - FIRST 15 MIN: Performed by: PODIATRIST

## 2023-11-10 PROCEDURE — 6360000002 HC RX W HCPCS: Performed by: NURSE ANESTHETIST, CERTIFIED REGISTERED

## 2023-11-10 PROCEDURE — 7100000011 HC PHASE II RECOVERY - ADDTL 15 MIN: Performed by: PODIATRIST

## 2023-11-10 PROCEDURE — 6360000002 HC RX W HCPCS: Performed by: ANESTHESIOLOGY

## 2023-11-10 PROCEDURE — 2500000003 HC RX 250 WO HCPCS: Performed by: NURSE ANESTHETIST, CERTIFIED REGISTERED

## 2023-11-10 PROCEDURE — C1769 GUIDE WIRE: HCPCS | Performed by: PODIATRIST

## 2023-11-10 PROCEDURE — 3700000001 HC ADD 15 MINUTES (ANESTHESIA): Performed by: PODIATRIST

## 2023-11-10 PROCEDURE — 2720000010 HC SURG SUPPLY STERILE: Performed by: PODIATRIST

## 2023-11-10 PROCEDURE — 3600000002 HC SURGERY LEVEL 2 BASE: Performed by: PODIATRIST

## 2023-11-10 PROCEDURE — 3700000000 HC ANESTHESIA ATTENDED CARE: Performed by: PODIATRIST

## 2023-11-10 PROCEDURE — 3600000012 HC SURGERY LEVEL 2 ADDTL 15MIN: Performed by: PODIATRIST

## 2023-11-10 PROCEDURE — 2709999900 HC NON-CHARGEABLE SUPPLY: Performed by: PODIATRIST

## 2023-11-10 PROCEDURE — C1713 ANCHOR/SCREW BN/BN,TIS/BN: HCPCS | Performed by: PODIATRIST

## 2023-11-10 DEVICE — ALLOGRAFT BNE PTTY 100 MM 5 CC DBM VESUVIUS: Type: IMPLANTABLE DEVICE | Site: FOOT | Status: FUNCTIONAL

## 2023-11-10 DEVICE — LP SCREW 3.0X16MM CORTICAL MTP TI: Type: IMPLANTABLE DEVICE | Site: FOOT | Status: FUNCTIONAL

## 2023-11-10 DEVICE — SCREW BNE L12MM DIA3MM CORT FT ANK TI SELF DRL SLD FULL: Type: IMPLANTABLE DEVICE | Site: FOOT | Status: FUNCTIONAL

## 2023-11-10 DEVICE — SCREW BNE L14MM DIA3MM CORT FT ANK TI FULL THRD LO PROF FOR: Type: IMPLANTABLE DEVICE | Site: FOOT | Status: FUNCTIONAL

## 2023-11-10 DEVICE — IMPLANTABLE DEVICE: Type: IMPLANTABLE DEVICE | Site: FOOT | Status: FUNCTIONAL

## 2023-11-10 DEVICE — PLATE  MTP MAX: Type: IMPLANTABLE DEVICE | Site: FOOT | Status: FUNCTIONAL

## 2023-11-10 DEVICE — SCREW BNE L16MM DIA3MM CORT FT ANK TI SELF DRL SLD FULL: Type: IMPLANTABLE DEVICE | Site: FOOT | Status: FUNCTIONAL

## 2023-11-10 RX ORDER — CHLORHEXIDINE GLUCONATE 4 G/100ML
SOLUTION TOPICAL ONCE
Status: DISCONTINUED | OUTPATIENT
Start: 2023-11-10 | End: 2023-11-10 | Stop reason: HOSPADM

## 2023-11-10 RX ORDER — PROPOFOL 10 MG/ML
INJECTION, EMULSION INTRAVENOUS PRN
Status: DISCONTINUED | OUTPATIENT
Start: 2023-11-10 | End: 2023-11-10 | Stop reason: SDUPTHER

## 2023-11-10 RX ORDER — OXYCODONE HYDROCHLORIDE 5 MG/1
10 TABLET ORAL PRN
Status: COMPLETED | OUTPATIENT
Start: 2023-11-10 | End: 2023-11-10

## 2023-11-10 RX ORDER — HYDROCODONE BITARTRATE AND ACETAMINOPHEN 5; 325 MG/1; MG/1
1 TABLET ORAL EVERY 4 HOURS PRN
Qty: 42 TABLET | Refills: 0 | Status: SHIPPED | OUTPATIENT
Start: 2023-11-10 | End: 2023-11-17

## 2023-11-10 RX ORDER — SODIUM CHLORIDE, SODIUM LACTATE, POTASSIUM CHLORIDE, CALCIUM CHLORIDE 600; 310; 30; 20 MG/100ML; MG/100ML; MG/100ML; MG/100ML
INJECTION, SOLUTION INTRAVENOUS CONTINUOUS
Status: DISCONTINUED | OUTPATIENT
Start: 2023-11-10 | End: 2023-11-10 | Stop reason: HOSPADM

## 2023-11-10 RX ORDER — OXYCODONE HYDROCHLORIDE 5 MG/1
5 TABLET ORAL PRN
Status: COMPLETED | OUTPATIENT
Start: 2023-11-10 | End: 2023-11-10

## 2023-11-10 RX ORDER — ONDANSETRON 4 MG/1
4 TABLET, FILM COATED ORAL 3 TIMES DAILY PRN
Qty: 15 TABLET | Refills: 0 | Status: SHIPPED | OUTPATIENT
Start: 2023-11-10

## 2023-11-10 RX ORDER — LIDOCAINE HYDROCHLORIDE 20 MG/ML
INJECTION, SOLUTION EPIDURAL; INFILTRATION; INTRACAUDAL; PERINEURAL PRN
Status: DISCONTINUED | OUTPATIENT
Start: 2023-11-10 | End: 2023-11-10 | Stop reason: SDUPTHER

## 2023-11-10 RX ORDER — BUPIVACAINE HYDROCHLORIDE 5 MG/ML
INJECTION, SOLUTION EPIDURAL; INTRACAUDAL PRN
Status: DISCONTINUED | OUTPATIENT
Start: 2023-11-10 | End: 2023-11-10 | Stop reason: ALTCHOICE

## 2023-11-10 RX ORDER — FENTANYL CITRATE 50 UG/ML
INJECTION, SOLUTION INTRAMUSCULAR; INTRAVENOUS PRN
Status: DISCONTINUED | OUTPATIENT
Start: 2023-11-10 | End: 2023-11-10 | Stop reason: SDUPTHER

## 2023-11-10 RX ORDER — MIDAZOLAM HYDROCHLORIDE 1 MG/ML
INJECTION INTRAMUSCULAR; INTRAVENOUS
Status: COMPLETED
Start: 2023-11-10 | End: 2023-11-10

## 2023-11-10 RX ORDER — ONDANSETRON 2 MG/ML
4 INJECTION INTRAMUSCULAR; INTRAVENOUS
Status: COMPLETED | OUTPATIENT
Start: 2023-11-10 | End: 2023-11-10

## 2023-11-10 RX ORDER — MIDAZOLAM HYDROCHLORIDE 1 MG/ML
INJECTION INTRAMUSCULAR; INTRAVENOUS PRN
Status: DISCONTINUED | OUTPATIENT
Start: 2023-11-10 | End: 2023-11-10 | Stop reason: SDUPTHER

## 2023-11-10 RX ORDER — OXYCODONE HYDROCHLORIDE AND ACETAMINOPHEN 5; 325 MG/1; MG/1
1 TABLET ORAL EVERY 6 HOURS PRN
Qty: 28 TABLET | Refills: 0 | Status: SHIPPED | OUTPATIENT
Start: 2023-11-10 | End: 2023-11-10 | Stop reason: HOSPADM

## 2023-11-10 RX ORDER — KETAMINE HCL IN NACL, ISO-OSM 100MG/10ML
SYRINGE (ML) INJECTION PRN
Status: DISCONTINUED | OUTPATIENT
Start: 2023-11-10 | End: 2023-11-10 | Stop reason: SDUPTHER

## 2023-11-10 RX ADMIN — PROPOFOL 50 MG: 10 INJECTION, EMULSION INTRAVENOUS at 10:28

## 2023-11-10 RX ADMIN — SODIUM CHLORIDE, SODIUM LACTATE, POTASSIUM CHLORIDE, AND CALCIUM CHLORIDE: 600; 310; 30; 20 INJECTION, SOLUTION INTRAVENOUS at 08:13

## 2023-11-10 RX ADMIN — WATER 2000 MG: 1 INJECTION INTRAMUSCULAR; INTRAVENOUS; SUBCUTANEOUS at 10:34

## 2023-11-10 RX ADMIN — PROPOFOL 50 MG: 10 INJECTION, EMULSION INTRAVENOUS at 10:25

## 2023-11-10 RX ADMIN — Medication 10 MG: at 11:42

## 2023-11-10 RX ADMIN — SODIUM CHLORIDE, SODIUM LACTATE, POTASSIUM CHLORIDE, AND CALCIUM CHLORIDE: 600; 310; 30; 20 INJECTION, SOLUTION INTRAVENOUS at 11:53

## 2023-11-10 RX ADMIN — Medication 10 MG: at 11:25

## 2023-11-10 RX ADMIN — Medication 10 MG: at 10:34

## 2023-11-10 RX ADMIN — Medication 10 MG: at 10:52

## 2023-11-10 RX ADMIN — PROPOFOL 50 MG: 10 INJECTION, EMULSION INTRAVENOUS at 10:22

## 2023-11-10 RX ADMIN — OXYCODONE HYDROCHLORIDE 5 MG: 5 TABLET ORAL at 13:07

## 2023-11-10 RX ADMIN — PROPOFOL 50 MG: 10 INJECTION, EMULSION INTRAVENOUS at 10:26

## 2023-11-10 RX ADMIN — FENTANYL CITRATE 50 MCG: 50 INJECTION, SOLUTION INTRAMUSCULAR; INTRAVENOUS at 12:17

## 2023-11-10 RX ADMIN — FENTANYL CITRATE 50 MCG: 50 INJECTION, SOLUTION INTRAMUSCULAR; INTRAVENOUS at 11:58

## 2023-11-10 RX ADMIN — MIDAZOLAM 2 MG: 1 INJECTION INTRAMUSCULAR; INTRAVENOUS at 10:14

## 2023-11-10 RX ADMIN — Medication 10 MG: at 11:29

## 2023-11-10 RX ADMIN — ONDANSETRON 4 MG: 2 INJECTION INTRAMUSCULAR; INTRAVENOUS at 13:01

## 2023-11-10 RX ADMIN — LIDOCAINE HYDROCHLORIDE 80 MG: 20 INJECTION, SOLUTION EPIDURAL; INFILTRATION; INTRACAUDAL; PERINEURAL at 10:22

## 2023-11-10 RX ADMIN — PROPOFOL 100 MCG/KG/MIN: 10 INJECTION, EMULSION INTRAVENOUS at 10:34

## 2023-11-10 ASSESSMENT — PAIN DESCRIPTION - LOCATION: LOCATION: FOOT

## 2023-11-10 ASSESSMENT — PAIN SCALES - GENERAL
PAINLEVEL_OUTOF10: 6
PAINLEVEL_OUTOF10: 5
PAINLEVEL_OUTOF10: 6

## 2023-11-10 ASSESSMENT — PAIN DESCRIPTION - ORIENTATION: ORIENTATION: RIGHT

## 2023-11-10 ASSESSMENT — PAIN - FUNCTIONAL ASSESSMENT: PAIN_FUNCTIONAL_ASSESSMENT: 0-10

## 2023-11-10 ASSESSMENT — PAIN DESCRIPTION - DESCRIPTORS
DESCRIPTORS: SORE;ACHING;SHARP
DESCRIPTORS: ACHING

## 2023-11-10 NOTE — OP NOTE
Operative Note      Patient: Jay Arce  YOB: 1970  MRN: [id number]    Date of Procedure: 11/10/2023    Pre-Op Diagnosis Codes:     * Hallux rigidus of right foot [M20.21]     * Osteoarthritis of right ankle, unspecified osteoarthritis type [M19.071]    Post-Op Diagnosis: Same       Procedure(s):  RIGHT FOOT FIRST METAPHALANGAL JOINT ARTHRODESIS WITH LARGE C-ARM MAC + LOCAL ANESTHESIA    Surgeon(s):  John Asif DPM    Assistant:   Surgical Assistant: Mars LOMAS    Anesthesia: Monitor Anesthesia Care    Estimated Blood Loss (mL): Minimal    Complications: None    Specimens:   * No specimens in log *    Implants:  Implant Name Type Inv. Item Serial No.  Lot No. LRB No. Used Action   ALLOGRAFT BNE PTTY 100 MM 5 CC DBM VESUVIUS - [id number]  ALLOGRAFT BNE PTTY 100 MM 5 CC DBM VESUVIUS  [employer] [facility]-WD [id number] 1 Implanted   SCREW BNE L14MM DIA3MM FLACA FT ANK TI FULL THRD LO PROF FOR - [id number]  SCREW BNE L14MM DIA3MM FLACA FT ANK TI FULL THRD LO PROF FOR  [employer]-WD 0 Right 3 Implanted   SCREW BNE L12MM DIA3MM FLACA FT ANK TI SELF DRL SLD FULL - [id number]  SCREW BNE L12MM DIA3MM FLACA FT ANK TI SELF DRL SLD FULL  [employer]-WD 0 Right 1 Implanted   SCREW BNE L16MM DIA3MM FLACA FT ANK TI SELF DRL SLD FULL - [id number]  SCREW BNE L16MM DIA3MM FLACA FT ANK TI SELF DRL SLD FULL  [employer]-WD 0 Right 1 Implanted   LP SCREW 3.0X16MM CORTICAL MTP TI - [id number]  LP SCREW 3.0X16MM CORTICAL MTP TI  [employer]-WD 0 Right 1 Implanted   SCREW BNE L32MM DIA3MM TI OPAL PARTIALLY THRD LO PROF - [id number]  SCREW BNE L32MM DIA3MM TI OPAL PARTIALLY THRD LO PROF  [employer]-WD 0 Right 1 Implanted   PLATE  MTP MAX - [id number]  PLATE  MTP MAX  [employer]-WD 0 Right 1 Implanted         Drains: * No LDAs found *    Findings: consistent with preop diagnosis        Detailed Description of Procedure:    The patient was brought into the operating room and secured on the aspect of the plate and compression was obtained across the arthrodesis site and then again plate was temporarily fixated. Next proximal screws were then inserted according to company protocol. Next K wire was then used as a guide for an interfrag screw, which was prepped and drilled and inserted according to company protocols and measured with 8 of intraoperative fluoroscopy. Next all temporary fixation was removed final imaging was taken. Surgical sites were flushed and irrigated jim with saline solution    Next a layered anatomic closure technique was performed utilizing a combination of Monocryl and Prolene sutures the surgical site was cleansed and dressed with Xeroform, 4 x 4 gauze, Kerlix, Ace; pneumatic tourniquet was deflated and a prompt hyperemic response noted to the distal aspect of the extremity     The patient tolerated the procedure and anesthesia well and was transferred from the OR to the recovery room with vital signs stable and vascular status intact to the lower extremity.        Electronically signed by Gabbie Thomas DPM on 11/10/2023 at 12:40 PM

## 2023-11-10 NOTE — DISCHARGE INSTRUCTIONS
Post Operative Instructions                                                                 Georgina Villalpando DPM        General:  - ELEVATE, ELEVATE, ELEVATE! Elevate the operative leg as much as possible during the first 3-5 days. This will help with healing, pain, and comfort. - Elevate you leg above the level of your waist. Prop it up on multiple pillows to ensure the leg is cushioned appropriately. - Apply ice behind the knee 3-4 times a day, 20 minutes on/ 20 minutes off. Do this for the first 2-3 days to help with pain and swelling.  - Use crutches, knee scooter, wheelchair, walker, etc for support  - Get plenty of rest.  - On the ride home from the hospital ensure your leg is propped up in the backseat of the car and elevated. Bandages:   - Keep you bandages clean, dry, and intact. - Do not remove or change your dressing unless instructed by the surgeons office  - Ensure that the bandages do not get wet while bathing. Use a cast cover or water proof covering to make sure. If the dressings do get wet, you must let the office know and we will change the dressing. Not doing so can cause wound and/or infection. Medications:  -Take medications as instructed. Typically as follows:  - Percocet: every 4-6 hours as needed for severe pain. Take one of these before bed the night of surgery, even if not having any pain at that time. This will prevent pain in the middle of the night if the anesthesia wears off at that time. Take with food to help prevent nausea. - Zofran: Take as needed for nausea. - Do not combine percocet and tylenol  - Once percocet is no longer needed, you may begin taking 1000mg Tylenol three times a day for pain, which you may combine with 800mg of Ibuprofen three times a day.   - Call with any questions or concerns    DISCHARGE SUMMARY from Nurse    PATIENT INSTRUCTIONS:    After general anesthesia or intravenous sedation, for 24 hours or while taking prescription Narcotics:  Limit your activities  Do not drive and operate hazardous machinery  Do not make important personal or business decisions  Do  not drink alcoholic beverages  If you have not urinated within 8 hours after discharge, please contact your surgeon on call. Report the following to your surgeon:  Excessive pain, swelling, redness or odor of or around the surgical area  Temperature over 100.5  Nausea and vomiting lasting longer than 4 hours or if unable to take medications  Any signs of decreased circulation or nerve impairment to extremity: change in color, persistent  numbness, tingling, coldness or increase pain  Any questions    What to do at Home:  Recommended activity: activity as tolerated and no driving until advised by doctor. *  Please give a list of your current medications to your Primary Care Provider. *  Please update this list whenever your medications are discontinued, doses are      changed, or new medications (including over-the-counter products) are added. *  Please carry medication information at all times in case of emergency situations. These are general instructions for a healthy lifestyle:    No smoking/ No tobacco products/ Avoid exposure to second hand smoke  Surgeon General's Warning:  Quitting smoking now greatly reduces serious risk to your health. Obesity, smoking, and sedentary lifestyle greatly increases your risk for illness    A healthy diet, regular physical exercise & weight monitoring are important for maintaining a healthy lifestyle    You may be retaining fluid if you have a history of heart failure or if you experience any of the following symptoms:  Weight gain of 3 pounds or more overnight or 5 pounds in a week, increased swelling in our hands or feet or shortness of breath while lying flat in bed. Please call your doctor as soon as you notice any of these symptoms; do not wait until your next office visit.     Patient armband removed and

## 2023-11-10 NOTE — ANESTHESIA PRE PROCEDURE
Plan      MAC     ASA 3       Induction: intravenous. Anesthetic plan and risks discussed with patient.                         Susanne Kaur MD   11/10/2023

## 2023-11-10 NOTE — INTERVAL H&P NOTE
Update History & Physical    The patient's History and Physical of October 31, 2023 was reviewed with the patient and I examined the patient. There was no change. The surgical site was confirmed by the patient and me. Plan: The risks, benefits, expected outcome, and alternative to the recommended procedure have been discussed with the patient. Patient understands and wants to proceed with the procedure.      Electronically signed by Olya Akins DPM on 11/10/2023 at 9:59 AM

## 2023-12-29 ENCOUNTER — APPOINTMENT (OUTPATIENT)
Facility: HOSPITAL | Age: 53
End: 2023-12-29
Payer: COMMERCIAL

## 2023-12-29 ENCOUNTER — HOSPITAL ENCOUNTER (EMERGENCY)
Facility: HOSPITAL | Age: 53
Discharge: HOME | End: 2023-12-29
Attending: EMERGENCY MEDICINE
Payer: COMMERCIAL

## 2023-12-29 VITALS
HEART RATE: 99 BPM | TEMPERATURE: 97.9 F | WEIGHT: 179 LBS | SYSTOLIC BLOOD PRESSURE: 126 MMHG | BODY MASS INDEX: 31.21 KG/M2 | OXYGEN SATURATION: 96 % | RESPIRATION RATE: 13 BRPM | DIASTOLIC BLOOD PRESSURE: 68 MMHG

## 2023-12-29 DIAGNOSIS — J45.41 MODERATE PERSISTENT ASTHMA WITH EXACERBATION: Primary | ICD-10-CM

## 2023-12-29 LAB
ALBUMIN SERPL-MCNC: 3.9 G/DL (ref 3.4–5)
ALBUMIN/GLOB SERPL: 1 (ref 0.8–1.7)
ALP SERPL-CCNC: 74 U/L (ref 45–117)
ALT SERPL-CCNC: 57 U/L (ref 13–56)
ANION GAP SERPL CALC-SCNC: 7 MMOL/L (ref 3–18)
AST SERPL-CCNC: 27 U/L (ref 10–38)
BASOPHILS # BLD: 0 K/UL (ref 0–0.1)
BASOPHILS NFR BLD: 0 % (ref 0–2)
BILIRUB SERPL-MCNC: 0.5 MG/DL (ref 0.2–1)
BUN SERPL-MCNC: 17 MG/DL (ref 7–18)
BUN/CREAT SERPL: 17 (ref 12–20)
CALCIUM SERPL-MCNC: 9.6 MG/DL (ref 8.5–10.1)
CHLORIDE SERPL-SCNC: 110 MMOL/L (ref 100–111)
CO2 SERPL-SCNC: 23 MMOL/L (ref 21–32)
CREAT SERPL-MCNC: 1.03 MG/DL (ref 0.6–1.3)
DIFFERENTIAL METHOD BLD: ABNORMAL
EKG ATRIAL RATE: 112 BPM
EKG DIAGNOSIS: NORMAL
EKG P AXIS: 59 DEGREES
EKG P-R INTERVAL: 150 MS
EKG Q-T INTERVAL: 328 MS
EKG QRS DURATION: 74 MS
EKG QTC CALCULATION (BAZETT): 447 MS
EKG R AXIS: 65 DEGREES
EKG T AXIS: 56 DEGREES
EKG VENTRICULAR RATE: 112 BPM
EOSINOPHIL # BLD: 0.2 K/UL (ref 0–0.4)
EOSINOPHIL NFR BLD: 3 % (ref 0–5)
ERYTHROCYTE [DISTWIDTH] IN BLOOD BY AUTOMATED COUNT: 14.1 % (ref 11.6–14.5)
GLOBULIN SER CALC-MCNC: 4 G/DL (ref 2–4)
GLUCOSE SERPL-MCNC: 116 MG/DL (ref 74–99)
HCT VFR BLD AUTO: 40.3 % (ref 35–45)
HGB BLD-MCNC: 12.9 G/DL (ref 12–16)
IMM GRANULOCYTES # BLD AUTO: 0 K/UL (ref 0–0.04)
IMM GRANULOCYTES NFR BLD AUTO: 0 % (ref 0–0.5)
LYMPHOCYTES # BLD: 1.9 K/UL (ref 0.9–3.6)
LYMPHOCYTES NFR BLD: 26 % (ref 21–52)
MAGNESIUM SERPL-MCNC: 2.2 MG/DL (ref 1.6–2.6)
MCH RBC QN AUTO: 29.7 PG (ref 24–34)
MCHC RBC AUTO-ENTMCNC: 32 G/DL (ref 31–37)
MCV RBC AUTO: 92.6 FL (ref 78–100)
MONOCYTES # BLD: 0.6 K/UL (ref 0.05–1.2)
MONOCYTES NFR BLD: 8 % (ref 3–10)
NEUTS SEG # BLD: 4.8 K/UL (ref 1.8–8)
NEUTS SEG NFR BLD: 63 % (ref 40–73)
NRBC # BLD: 0 K/UL (ref 0–0.01)
NRBC BLD-RTO: 0 PER 100 WBC
PLATELET # BLD AUTO: 299 K/UL (ref 135–420)
PMV BLD AUTO: 8.6 FL (ref 9.2–11.8)
POTASSIUM SERPL-SCNC: 3.9 MMOL/L (ref 3.5–5.5)
PROT SERPL-MCNC: 7.9 G/DL (ref 6.4–8.2)
RBC # BLD AUTO: 4.35 M/UL (ref 4.2–5.3)
SODIUM SERPL-SCNC: 140 MMOL/L (ref 136–145)
WBC # BLD AUTO: 7.6 K/UL (ref 4.6–13.2)

## 2023-12-29 PROCEDURE — 93005 ELECTROCARDIOGRAM TRACING: CPT | Performed by: EMERGENCY MEDICINE

## 2023-12-29 PROCEDURE — 2500000003 HC RX 250 WO HCPCS: Performed by: EMERGENCY MEDICINE

## 2023-12-29 PROCEDURE — 85025 COMPLETE CBC W/AUTO DIFF WBC: CPT

## 2023-12-29 PROCEDURE — 94762 N-INVAS EAR/PLS OXIMTRY CONT: CPT

## 2023-12-29 PROCEDURE — 2580000003 HC RX 258: Performed by: EMERGENCY MEDICINE

## 2023-12-29 PROCEDURE — 80053 COMPREHEN METABOLIC PANEL: CPT

## 2023-12-29 PROCEDURE — 83735 ASSAY OF MAGNESIUM: CPT

## 2023-12-29 PROCEDURE — 6370000000 HC RX 637 (ALT 250 FOR IP): Performed by: EMERGENCY MEDICINE

## 2023-12-29 PROCEDURE — A4216 STERILE WATER/SALINE, 10 ML: HCPCS | Performed by: EMERGENCY MEDICINE

## 2023-12-29 PROCEDURE — 71045 X-RAY EXAM CHEST 1 VIEW: CPT

## 2023-12-29 PROCEDURE — 96365 THER/PROPH/DIAG IV INF INIT: CPT

## 2023-12-29 PROCEDURE — C9113 INJ PANTOPRAZOLE SODIUM, VIA: HCPCS | Performed by: EMERGENCY MEDICINE

## 2023-12-29 PROCEDURE — 99285 EMERGENCY DEPT VISIT HI MDM: CPT

## 2023-12-29 PROCEDURE — 6360000002 HC RX W HCPCS: Performed by: EMERGENCY MEDICINE

## 2023-12-29 PROCEDURE — 96375 TX/PRO/DX INJ NEW DRUG ADDON: CPT

## 2023-12-29 PROCEDURE — 6370000000 HC RX 637 (ALT 250 FOR IP): Performed by: STUDENT IN AN ORGANIZED HEALTH CARE EDUCATION/TRAINING PROGRAM

## 2023-12-29 RX ORDER — IPRATROPIUM BROMIDE AND ALBUTEROL SULFATE 2.5; .5 MG/3ML; MG/3ML
1 SOLUTION RESPIRATORY (INHALATION)
Status: COMPLETED | OUTPATIENT
Start: 2023-12-29 | End: 2023-12-29

## 2023-12-29 RX ORDER — IPRATROPIUM BROMIDE AND ALBUTEROL SULFATE 2.5; .5 MG/3ML; MG/3ML
1 SOLUTION RESPIRATORY (INHALATION)
Status: DISCONTINUED | OUTPATIENT
Start: 2023-12-29 | End: 2023-12-29

## 2023-12-29 RX ORDER — MAGNESIUM SULFATE HEPTAHYDRATE 40 MG/ML
2000 INJECTION, SOLUTION INTRAVENOUS ONCE
Status: COMPLETED | OUTPATIENT
Start: 2023-12-29 | End: 2023-12-29

## 2023-12-29 RX ORDER — BENZONATATE 100 MG/1
100 CAPSULE ORAL 3 TIMES DAILY PRN
Qty: 21 CAPSULE | Refills: 0 | Status: SHIPPED | OUTPATIENT
Start: 2023-12-29 | End: 2024-01-05

## 2023-12-29 RX ORDER — GUAIFENESIN/DEXTROMETHORPHAN 100-10MG/5
5 SYRUP ORAL 3 TIMES DAILY PRN
Qty: 120 ML | Refills: 0 | Status: SHIPPED | OUTPATIENT
Start: 2023-12-29 | End: 2024-01-07

## 2023-12-29 RX ORDER — PREDNISONE 50 MG/1
50 TABLET ORAL DAILY
Qty: 5 TABLET | Refills: 0 | Status: SHIPPED | OUTPATIENT
Start: 2023-12-29 | End: 2024-01-03

## 2023-12-29 RX ORDER — IPRATROPIUM BROMIDE AND ALBUTEROL SULFATE 2.5; .5 MG/3ML; MG/3ML
1 SOLUTION RESPIRATORY (INHALATION) ONCE
Status: COMPLETED | OUTPATIENT
Start: 2023-12-29 | End: 2023-12-29

## 2023-12-29 RX ORDER — IPRATROPIUM BROMIDE AND ALBUTEROL SULFATE 2.5; .5 MG/3ML; MG/3ML
1 SOLUTION RESPIRATORY (INHALATION) EVERY 4 HOURS
Qty: 360 ML | Refills: 0 | Status: ON HOLD | OUTPATIENT
Start: 2023-12-29

## 2023-12-29 RX ADMIN — PANTOPRAZOLE SODIUM 40 MG: 40 INJECTION, POWDER, FOR SOLUTION INTRAVENOUS at 06:51

## 2023-12-29 RX ADMIN — MAGNESIUM SULFATE HEPTAHYDRATE 2000 MG: 40 INJECTION, SOLUTION INTRAVENOUS at 06:51

## 2023-12-29 RX ADMIN — IPRATROPIUM BROMIDE AND ALBUTEROL SULFATE 1 DOSE: 2.5; .5 SOLUTION RESPIRATORY (INHALATION) at 06:23

## 2023-12-29 RX ADMIN — IPRATROPIUM BROMIDE AND ALBUTEROL SULFATE 1 DOSE: 2.5; .5 SOLUTION RESPIRATORY (INHALATION) at 06:40

## 2023-12-29 RX ADMIN — IPRATROPIUM BROMIDE AND ALBUTEROL SULFATE 1 DOSE: 2.5; .5 SOLUTION RESPIRATORY (INHALATION) at 07:59

## 2023-12-29 RX ADMIN — WATER 125 MG: 1 INJECTION INTRAMUSCULAR; INTRAVENOUS; SUBCUTANEOUS at 06:51

## 2023-12-29 ASSESSMENT — PAIN - FUNCTIONAL ASSESSMENT: PAIN_FUNCTIONAL_ASSESSMENT: NONE - DENIES PAIN

## 2023-12-29 NOTE — ED TRIAGE NOTES
C/o sob and asthma attack with a cough for weeks pt states over the last 2 days she has been increasing wheezy and sob

## 2023-12-29 NOTE — ED NOTES
Paperwork read with patient making note of where to  prescriptions. IV taken out. Armband removed and disposed of in shredder. Patient has no further questions at this time. Will discharge from system.

## 2023-12-29 NOTE — DISCHARGE INSTRUCTIONS
You were evaluated for shortness of breath and wheezing. Based on your work-up it was deemed that she was stable for discharge. Please  your medication of prednisone, duoneb, robitussin, tessalon perles which was prescribed to you. Please follow-up with your primary care physician if you have any further concerns and go over your work-up. If you experience any chest pain, shortness of breath, worsening abdominal pain, vomiting blood, worsening headache, seizures, or any worsening of your symptoms please return to the emergency department immediately.   If you have any pending results or any further questions please contact the emergency department at [phone]

## 2023-12-29 NOTE — ED PROVIDER NOTES
4.35 4.20 - 5.30 M/uL    Hemoglobin 12.9 12.0 - 16.0 g/dL    Hematocrit 40.3 35.0 - 45.0 %    MCV 92.6 78.0 - 100.0 FL    MCH 29.7 24.0 - 34.0 PG    MCHC 32.0 31.0 - 37.0 g/dL    RDW 14.1 11.6 - 14.5 %    Platelets 299 135 - 420 K/uL    MPV 8.6 (L) 9.2 - 11.8 FL    Nucleated RBCs 0.0 0  WBC    nRBC 0.00 0.00 - 0.01 K/uL    Neutrophils % 63 40 - 73 %    Lymphocytes % 26 21 - 52 %    Monocytes % 8 3 - 10 %    Eosinophils % 3 0 - 5 %    Basophils % 0 0 - 2 %    Immature Granulocytes 0 0.0 - 0.5 %    Neutrophils Absolute 4.8 1.8 - 8.0 K/UL    Lymphocytes Absolute 1.9 0.9 - 3.6 K/UL    Monocytes Absolute 0.6 0.05 - 1.2 K/UL    Eosinophils Absolute 0.2 0.0 - 0.4 K/UL    Basophils Absolute 0.0 0.0 - 0.1 K/UL    Absolute Immature Granulocyte 0.0 0.00 - 0.04 K/UL    Differential Type AUTOMATED     Comprehensive Metabolic Panel    Collection Time: 12/29/23  6:28 AM   Result Value Ref Range    Sodium 140 136 - 145 mmol/L    Potassium 3.9 3.5 - 5.5 mmol/L    Chloride 110 100 - 111 mmol/L    CO2 23 21 - 32 mmol/L    Anion Gap 7 3.0 - 18 mmol/L    Glucose 116 (H) 74 - 99 mg/dL    BUN 17 7.0 - 18 MG/DL    Creatinine 1.03 0.6 - 1.3 MG/DL    Bun/Cre Ratio 17 12 - 20      Est, Glom Filt Rate >60 >60 ml/min/1.73m2    Calcium 9.6 8.5 - 10.1 MG/DL    Total Bilirubin 0.5 0.2 - 1.0 MG/DL    ALT 57 (H) 13 - 56 U/L    AST 27 10 - 38 U/L    Alk Phosphatase 74 45 - 117 U/L    Total Protein 7.9 6.4 - 8.2 g/dL    Albumin 3.9 3.4 - 5.0 g/dL    Globulin 4.0 2.0 - 4.0 g/dL    Albumin/Globulin Ratio 1.0 0.8 - 1.7     Magnesium    Collection Time: 12/29/23  6:28 AM   Result Value Ref Range    Magnesium 2.2 1.6 - 2.6 mg/dL       Radiologic Studies -   XR CHEST PORTABLE    (Results Pending)         Procedures and Critical Care     Performed by: GILDA PETE, DO    Procedures     GILDA PETE, DO    Medical Decision Making and ED Course   - I am the first and primary provider for this patient AND AM THE PRIMARY PROVIDER OF RECORD.    - I  Nursing notes    Social Determinants of Health: None identified    Is this patient to be included in the SEP-1 core measure due to severe sepsis or septic shock? No Exclusion criteria - the patient is NOT to be included for SEP-1 Core Measure due to: Viral etiology found or highly suspected (including COVID-19) without concomitant bacterial infection        Meds Given in ED:  Medications   magnesium sulfate 2000 mg in water 50 mL IVPB (2,000 mg IntraVENous New Bag 12/29/23 0651)   ipratropium 0.5 mg-albuterol 2.5 mg (DUONEB) nebulizer solution 1 Dose (1 Dose Inhalation Given 12/29/23 0623)   methylPREDNISolone sodium succ (SOLU-MEDROL) 125 mg in sterile water 2 mL injection (125 mg IntraVENous Given 12/29/23 0651)   ipratropium 0.5 mg-albuterol 2.5 mg (DUONEB) nebulizer solution 1 Dose (1 Dose Inhalation Given 12/29/23 0640)   pantoprazole (PROTONIX) 40 mg in sodium chloride (PF) 0.9 % 10 mL injection (40 mg IntraVENous Given 12/29/23 0651)       Final Diagnosis:  1. Moderate persistent asthma with exacerbation        Disposition:  Destination: Anticipate discharge    Discharge Rx:   New Prescriptions    BENZONATATE (TESSALON PERLES) 100 MG CAPSULE    Take 1 capsule by mouth 3 times daily as needed for Cough    GUAIFENESIN-DEXTROMETHORPHAN (ROBITUSSIN DM) 100-10 MG/5ML SYRUP    Take 5 mLs by mouth 3 times daily as needed for Cough    PREDNISONE (DELTASONE) 50 MG TABLET    Take 1 tablet by mouth daily for 5 days         Dictation disclaimer: Please note that this dictation was completed with [employer], the [employer] voice recognition software. Quite often unanticipated grammatical, syntax, homophones, and other interpretive errors are inadvertently transcribed by the computer software. Please disregard these errors. Please excuse any errors that have escaped final proofreading. Mily Yap D.O.   Emergency Physician  [facility], [address] [address], [place]  12/29/23 1989

## 2024-01-02 ENCOUNTER — CARE COORDINATION (OUTPATIENT)
Dept: OTHER | Facility: CLINIC | Age: 54
End: 2024-01-02

## 2024-01-02 NOTE — CARE COORDINATION
Ambulatory Care Coordination Note    LPN CC attempted to reach patient for introduction to Associate Care Management related to ER visit for Asthma Exacerbation. HIPAA compliant message left requesting a return phone call at patient convenience.     Plan for follow-up call in 1-2 days      No future appointments.

## 2024-01-03 ENCOUNTER — CARE COORDINATION (OUTPATIENT)
Dept: OTHER | Facility: CLINIC | Age: 54
End: 2024-01-03

## 2024-01-03 NOTE — CARE COORDINATION
Ambulatory Care Coordination Note    LPN CC attempted 2nd outreach to patient for introduction to Associate Care Management related to ER visit for Asthma exacerbation. HIPAA compliant message left requesting a return phone call at patient convenience.     Unable to Reach Letter sent to patient via chart.    Will continue to outreach.      4:03pm Patient returned call, states she is doing well, has all care needs in place and is feeling better. Denies any ACM needs and declined ACM services at this time.

## 2024-01-07 ENCOUNTER — APPOINTMENT (OUTPATIENT)
Facility: HOSPITAL | Age: 54
DRG: 203 | End: 2024-01-07
Payer: COMMERCIAL

## 2024-01-07 ENCOUNTER — HOSPITAL ENCOUNTER (INPATIENT)
Facility: HOSPITAL | Age: 54
LOS: 2 days | Discharge: HOME | DRG: 203 | End: 2024-01-09
Attending: INTERNAL MEDICINE | Admitting: INTERNAL MEDICINE
Payer: COMMERCIAL

## 2024-01-07 DIAGNOSIS — J96.01 ACUTE RESPIRATORY FAILURE WITH HYPOXIA (HCC): ICD-10-CM

## 2024-01-07 DIAGNOSIS — J45.51 SEVERE PERSISTENT ASTHMA WITH EXACERBATION: ICD-10-CM

## 2024-01-07 DIAGNOSIS — R09.02 HYPOXIA: Primary | ICD-10-CM

## 2024-01-07 DIAGNOSIS — J45.901 MODERATE ASTHMA WITH ACUTE EXACERBATION, UNSPECIFIED WHETHER PERSISTENT: ICD-10-CM

## 2024-01-07 PROBLEM — J96.91 HYPOXIC RESPIRATORY FAILURE (HCC): Status: ACTIVE | Noted: 2024-01-07

## 2024-01-07 LAB
ALBUMIN SERPL-MCNC: 3.7 G/DL (ref 3.4–5)
ALBUMIN/GLOB SERPL: 1.2 (ref 0.8–1.7)
ALP SERPL-CCNC: 69 U/L (ref 45–117)
ALT SERPL-CCNC: 45 U/L (ref 13–56)
ANION GAP SERPL CALC-SCNC: 8 MMOL/L (ref 3–18)
ARTERIAL PATENCY WRIST A: POSITIVE
AST SERPL-CCNC: 20 U/L (ref 10–38)
BASE DEFICIT BLD-SCNC: 1.1 MMOL/L
BASOPHILS # BLD: 0 K/UL (ref 0–0.1)
BASOPHILS NFR BLD: 0 % (ref 0–2)
BDY SITE: ABNORMAL
BILIRUB SERPL-MCNC: 0.4 MG/DL (ref 0.2–1)
BUN SERPL-MCNC: 18 MG/DL (ref 7–18)
BUN/CREAT SERPL: 19 (ref 12–20)
CALCIUM SERPL-MCNC: 8.8 MG/DL (ref 8.5–10.1)
CHLORIDE SERPL-SCNC: 109 MMOL/L (ref 100–111)
CO2 SERPL-SCNC: 26 MMOL/L (ref 21–32)
CREAT SERPL-MCNC: 0.96 MG/DL (ref 0.6–1.3)
D DIMER PPP FEU-MCNC: 0.3 UG/ML(FEU)
DIFFERENTIAL METHOD BLD: ABNORMAL
EOSINOPHIL # BLD: 0.3 K/UL (ref 0–0.4)
EOSINOPHIL NFR BLD: 3 % (ref 0–5)
ERYTHROCYTE [DISTWIDTH] IN BLOOD BY AUTOMATED COUNT: 14.2 % (ref 11.6–14.5)
FLUAV RNA SPEC QL NAA+PROBE: NOT DETECTED
FLUBV RNA SPEC QL NAA+PROBE: NOT DETECTED
GAS FLOW.O2 O2 DELIVERY SYS: ABNORMAL
GLOBULIN SER CALC-MCNC: 3.1 G/DL (ref 2–4)
GLUCOSE SERPL-MCNC: 103 MG/DL (ref 74–99)
HCO3 BLD-SCNC: 23 MMOL/L (ref 22–26)
HCT VFR BLD AUTO: 39.5 % (ref 35–45)
HGB BLD-MCNC: 12.9 G/DL (ref 12–16)
IMM GRANULOCYTES # BLD AUTO: 0.1 K/UL (ref 0–0.04)
IMM GRANULOCYTES NFR BLD AUTO: 1 % (ref 0–0.5)
LYMPHOCYTES # BLD: 1.7 K/UL (ref 0.9–3.6)
LYMPHOCYTES NFR BLD: 16 % (ref 21–52)
MAGNESIUM SERPL-MCNC: 2.2 MG/DL (ref 1.6–2.6)
MCH RBC QN AUTO: 29.9 PG (ref 24–34)
MCHC RBC AUTO-ENTMCNC: 32.7 G/DL (ref 31–37)
MCV RBC AUTO: 91.4 FL (ref 78–100)
MONOCYTES # BLD: 0.7 K/UL (ref 0.05–1.2)
MONOCYTES NFR BLD: 7 % (ref 3–10)
NEUTS SEG # BLD: 8 K/UL (ref 1.8–8)
NEUTS SEG NFR BLD: 74 % (ref 40–73)
NRBC # BLD: 0 K/UL (ref 0–0.01)
NRBC BLD-RTO: 0 PER 100 WBC
O2/TOTAL GAS SETTING VFR VENT: 21 %
PCO2 BLD: 35.4 MMHG (ref 35–45)
PH BLD: 7.42 (ref 7.35–7.45)
PLATELET # BLD AUTO: 235 K/UL (ref 135–420)
PMV BLD AUTO: 8.6 FL (ref 9.2–11.8)
PO2 BLD: 67 MMHG (ref 80–100)
POTASSIUM SERPL-SCNC: 4 MMOL/L (ref 3.5–5.5)
PROT SERPL-MCNC: 6.8 G/DL (ref 6.4–8.2)
RBC # BLD AUTO: 4.32 M/UL (ref 4.2–5.3)
SAO2 % BLD: 93.5 % (ref 92–97)
SARS-COV-2 RNA RESP QL NAA+PROBE: NOT DETECTED
SERVICE CMNT-IMP: ABNORMAL
SODIUM SERPL-SCNC: 143 MMOL/L (ref 136–145)
SPECIMEN TYPE: ABNORMAL
TROPONIN I SERPL HS-MCNC: 5 NG/L (ref 0–54)
TROPONIN I SERPL HS-MCNC: 5 NG/L (ref 0–54)
WBC # BLD AUTO: 10.9 K/UL (ref 4.6–13.2)

## 2024-01-07 PROCEDURE — 85025 COMPLETE CBC W/AUTO DIFF WBC: CPT

## 2024-01-07 PROCEDURE — 71275 CT ANGIOGRAPHY CHEST: CPT

## 2024-01-07 PROCEDURE — C9113 INJ PANTOPRAZOLE SODIUM, VIA: HCPCS | Performed by: INTERNAL MEDICINE

## 2024-01-07 PROCEDURE — 6370000000 HC RX 637 (ALT 250 FOR IP): Performed by: INTERNAL MEDICINE

## 2024-01-07 PROCEDURE — 6360000002 HC RX W HCPCS: Performed by: EMERGENCY MEDICINE

## 2024-01-07 PROCEDURE — 71045 X-RAY EXAM CHEST 1 VIEW: CPT

## 2024-01-07 PROCEDURE — 2580000003 HC RX 258: Performed by: EMERGENCY MEDICINE

## 2024-01-07 PROCEDURE — A4216 STERILE WATER/SALINE, 10 ML: HCPCS | Performed by: INTERNAL MEDICINE

## 2024-01-07 PROCEDURE — 82803 BLOOD GASES ANY COMBINATION: CPT

## 2024-01-07 PROCEDURE — 6370000000 HC RX 637 (ALT 250 FOR IP): Performed by: EMERGENCY MEDICINE

## 2024-01-07 PROCEDURE — 80053 COMPREHEN METABOLIC PANEL: CPT

## 2024-01-07 PROCEDURE — 2500000003 HC RX 250 WO HCPCS: Performed by: EMERGENCY MEDICINE

## 2024-01-07 PROCEDURE — 36600 WITHDRAWAL OF ARTERIAL BLOOD: CPT

## 2024-01-07 PROCEDURE — 85379 FIBRIN DEGRADATION QUANT: CPT

## 2024-01-07 PROCEDURE — 1100000003 HC PRIVATE W/ TELEMETRY

## 2024-01-07 PROCEDURE — 87636 SARSCOV2 & INF A&B AMP PRB: CPT

## 2024-01-07 PROCEDURE — 6360000004 HC RX CONTRAST MEDICATION: Performed by: INTERNAL MEDICINE

## 2024-01-07 PROCEDURE — 6360000002 HC RX W HCPCS: Performed by: INTERNAL MEDICINE

## 2024-01-07 PROCEDURE — 99285 EMERGENCY DEPT VISIT HI MDM: CPT

## 2024-01-07 PROCEDURE — 96366 THER/PROPH/DIAG IV INF ADDON: CPT

## 2024-01-07 PROCEDURE — 94640 AIRWAY INHALATION TREATMENT: CPT

## 2024-01-07 PROCEDURE — 2580000003 HC RX 258: Performed by: INTERNAL MEDICINE

## 2024-01-07 PROCEDURE — 84484 ASSAY OF TROPONIN QUANT: CPT

## 2024-01-07 PROCEDURE — 96365 THER/PROPH/DIAG IV INF INIT: CPT

## 2024-01-07 PROCEDURE — 83735 ASSAY OF MAGNESIUM: CPT

## 2024-01-07 PROCEDURE — 2500000003 HC RX 250 WO HCPCS: Performed by: INTERNAL MEDICINE

## 2024-01-07 RX ORDER — POLYETHYLENE GLYCOL 3350 17 G/17G
17 POWDER, FOR SOLUTION ORAL DAILY PRN
Status: DISCONTINUED | OUTPATIENT
Start: 2024-01-07 | End: 2024-01-09 | Stop reason: HOSPADM

## 2024-01-07 RX ORDER — LANOLIN ALCOHOL/MO/W.PET/CERES
1000 CREAM (GRAM) TOPICAL DAILY
Status: DISCONTINUED | OUTPATIENT
Start: 2024-01-08 | End: 2024-01-09 | Stop reason: HOSPADM

## 2024-01-07 RX ORDER — SODIUM CHLORIDE 0.9 % (FLUSH) 0.9 %
5-40 SYRINGE (ML) INJECTION EVERY 12 HOURS SCHEDULED
Status: DISCONTINUED | OUTPATIENT
Start: 2024-01-07 | End: 2024-01-09 | Stop reason: HOSPADM

## 2024-01-07 RX ORDER — IPRATROPIUM BROMIDE AND ALBUTEROL SULFATE 2.5; .5 MG/3ML; MG/3ML
1 SOLUTION RESPIRATORY (INHALATION)
Status: DISCONTINUED | OUTPATIENT
Start: 2024-01-07 | End: 2024-01-09 | Stop reason: HOSPADM

## 2024-01-07 RX ORDER — ALBUTEROL SULFATE 2.5 MG/3ML
2.5 SOLUTION RESPIRATORY (INHALATION)
Status: COMPLETED | OUTPATIENT
Start: 2024-01-07 | End: 2024-01-07

## 2024-01-07 RX ORDER — AMOXICILLIN 500 MG
1 CAPSULE ORAL DAILY
Status: DISCONTINUED | OUTPATIENT
Start: 2024-01-07 | End: 2024-01-09 | Stop reason: HOSPADM

## 2024-01-07 RX ORDER — MAGNESIUM SULFATE IN WATER 40 MG/ML
2000 INJECTION, SOLUTION INTRAVENOUS PRN
Status: DISCONTINUED | OUTPATIENT
Start: 2024-01-07 | End: 2024-01-09 | Stop reason: HOSPADM

## 2024-01-07 RX ORDER — VITAMIN B COMPLEX
3000 TABLET ORAL DAILY
Status: DISCONTINUED | OUTPATIENT
Start: 2024-01-08 | End: 2024-01-09 | Stop reason: HOSPADM

## 2024-01-07 RX ORDER — POTASSIUM CHLORIDE 7.45 MG/ML
10 INJECTION INTRAVENOUS PRN
Status: DISCONTINUED | OUTPATIENT
Start: 2024-01-07 | End: 2024-01-09 | Stop reason: HOSPADM

## 2024-01-07 RX ORDER — IPRATROPIUM BROMIDE AND ALBUTEROL SULFATE 2.5; .5 MG/3ML; MG/3ML
1 SOLUTION RESPIRATORY (INHALATION)
Status: COMPLETED | OUTPATIENT
Start: 2024-01-07 | End: 2024-01-07

## 2024-01-07 RX ORDER — LANOLIN ALCOHOL/MO/W.PET/CERES
1000 CREAM (GRAM) TOPICAL DAILY
Status: DISCONTINUED | OUTPATIENT
Start: 2024-01-07 | End: 2024-01-07 | Stop reason: SDUPTHER

## 2024-01-07 RX ORDER — MECOBALAMIN 5000 MCG
5 TABLET,DISINTEGRATING ORAL DAILY PRN
Status: DISCONTINUED | OUTPATIENT
Start: 2024-01-07 | End: 2024-01-09 | Stop reason: HOSPADM

## 2024-01-07 RX ORDER — MAGNESIUM SULFATE HEPTAHYDRATE 40 MG/ML
2000 INJECTION, SOLUTION INTRAVENOUS ONCE
Status: COMPLETED | OUTPATIENT
Start: 2024-01-07 | End: 2024-01-07

## 2024-01-07 RX ORDER — ONDANSETRON 2 MG/ML
4 INJECTION INTRAMUSCULAR; INTRAVENOUS EVERY 6 HOURS PRN
Status: DISCONTINUED | OUTPATIENT
Start: 2024-01-07 | End: 2024-01-09 | Stop reason: HOSPADM

## 2024-01-07 RX ORDER — ALPRAZOLAM 0.5 MG/1
0.5 TABLET ORAL PRN
Status: DISCONTINUED | OUTPATIENT
Start: 2024-01-07 | End: 2024-01-09 | Stop reason: HOSPADM

## 2024-01-07 RX ORDER — ONDANSETRON 4 MG/1
4 TABLET, ORALLY DISINTEGRATING ORAL EVERY 8 HOURS PRN
Status: DISCONTINUED | OUTPATIENT
Start: 2024-01-07 | End: 2024-01-09 | Stop reason: HOSPADM

## 2024-01-07 RX ORDER — ARFORMOTEROL TARTRATE 15 UG/2ML
15 SOLUTION RESPIRATORY (INHALATION)
Status: DISCONTINUED | OUTPATIENT
Start: 2024-01-07 | End: 2024-01-09 | Stop reason: HOSPADM

## 2024-01-07 RX ORDER — ONDANSETRON 4 MG/1
4 TABLET, FILM COATED ORAL 3 TIMES DAILY PRN
Status: DISCONTINUED | OUTPATIENT
Start: 2024-01-07 | End: 2024-01-09 | Stop reason: HOSPADM

## 2024-01-07 RX ORDER — FLUTICASONE PROPIONATE 50 MCG
2 SPRAY, SUSPENSION (ML) NASAL DAILY
Status: DISCONTINUED | OUTPATIENT
Start: 2024-01-07 | End: 2024-01-09 | Stop reason: HOSPADM

## 2024-01-07 RX ORDER — VENLAFAXINE 37.5 MG/1
37.5 TABLET ORAL 2 TIMES DAILY
Status: DISCONTINUED | OUTPATIENT
Start: 2024-01-07 | End: 2024-01-09 | Stop reason: HOSPADM

## 2024-01-07 RX ORDER — EZETIMIBE 10 MG/1
10 TABLET ORAL DAILY
Status: DISCONTINUED | OUTPATIENT
Start: 2024-01-07 | End: 2024-01-09 | Stop reason: HOSPADM

## 2024-01-07 RX ORDER — DILTIAZEM HYDROCHLORIDE 240 MG/1
240 CAPSULE, COATED, EXTENDED RELEASE ORAL DAILY
Status: DISCONTINUED | OUTPATIENT
Start: 2024-01-07 | End: 2024-01-09 | Stop reason: HOSPADM

## 2024-01-07 RX ORDER — LANOLIN ALCOHOL/MO/W.PET/CERES
400 CREAM (GRAM) TOPICAL NIGHTLY
Status: DISCONTINUED | OUTPATIENT
Start: 2024-01-07 | End: 2024-01-09 | Stop reason: HOSPADM

## 2024-01-07 RX ORDER — SODIUM CHLORIDE 0.9 % (FLUSH) 0.9 %
5-40 SYRINGE (ML) INJECTION PRN
Status: DISCONTINUED | OUTPATIENT
Start: 2024-01-07 | End: 2024-01-09 | Stop reason: HOSPADM

## 2024-01-07 RX ORDER — SODIUM CHLORIDE 9 MG/ML
INJECTION, SOLUTION INTRAVENOUS PRN
Status: DISCONTINUED | OUTPATIENT
Start: 2024-01-07 | End: 2024-01-09 | Stop reason: HOSPADM

## 2024-01-07 RX ORDER — DEXAMETHASONE SODIUM PHOSPHATE 10 MG/ML
10 INJECTION, SOLUTION INTRAMUSCULAR; INTRAVENOUS
Status: COMPLETED | OUTPATIENT
Start: 2024-01-07 | End: 2024-01-07

## 2024-01-07 RX ORDER — BUDESONIDE 0.5 MG/2ML
0.5 INHALANT ORAL
Status: DISCONTINUED | OUTPATIENT
Start: 2024-01-07 | End: 2024-01-09 | Stop reason: HOSPADM

## 2024-01-07 RX ORDER — ENOXAPARIN SODIUM 100 MG/ML
40 INJECTION SUBCUTANEOUS DAILY
Status: DISCONTINUED | OUTPATIENT
Start: 2024-01-07 | End: 2024-01-09 | Stop reason: HOSPADM

## 2024-01-07 RX ORDER — 0.9 % SODIUM CHLORIDE 0.9 %
1000 INTRAVENOUS SOLUTION INTRAVENOUS ONCE
Status: COMPLETED | OUTPATIENT
Start: 2024-01-07 | End: 2024-01-07

## 2024-01-07 RX ORDER — DEXAMETHASONE SODIUM PHOSPHATE 4 MG/ML
4 INJECTION, SOLUTION INTRA-ARTICULAR; INTRALESIONAL; INTRAMUSCULAR; INTRAVENOUS; SOFT TISSUE
Status: DISCONTINUED | OUTPATIENT
Start: 2024-01-07 | End: 2024-01-07

## 2024-01-07 RX ORDER — GUAIFENESIN 600 MG/1
600 TABLET, EXTENDED RELEASE ORAL 2 TIMES DAILY
Status: DISCONTINUED | OUTPATIENT
Start: 2024-01-07 | End: 2024-01-09 | Stop reason: HOSPADM

## 2024-01-07 RX ORDER — POTASSIUM CHLORIDE 20 MEQ/1
40 TABLET, EXTENDED RELEASE ORAL PRN
Status: DISCONTINUED | OUTPATIENT
Start: 2024-01-07 | End: 2024-01-09 | Stop reason: HOSPADM

## 2024-01-07 RX ADMIN — IPRATROPIUM BROMIDE AND ALBUTEROL SULFATE 1 DOSE: .5; 3 SOLUTION RESPIRATORY (INHALATION) at 16:53

## 2024-01-07 RX ADMIN — VENLAFAXINE 37.5 MG: 37.5 TABLET ORAL at 21:08

## 2024-01-07 RX ADMIN — IPRATROPIUM BROMIDE AND ALBUTEROL SULFATE 1 DOSE: 2.5; .5 SOLUTION RESPIRATORY (INHALATION) at 21:32

## 2024-01-07 RX ADMIN — MAGNESIUM SULFATE HEPTAHYDRATE 2000 MG: 40 INJECTION, SOLUTION INTRAVENOUS at 15:19

## 2024-01-07 RX ADMIN — IOPAMIDOL 100 ML: 755 INJECTION, SOLUTION INTRAVENOUS at 19:43

## 2024-01-07 RX ADMIN — ARFORMOTEROL TARTRATE 15 MCG: 15 SOLUTION RESPIRATORY (INHALATION) at 21:32

## 2024-01-07 RX ADMIN — BUDESONIDE 500 MCG: 0.5 SUSPENSION RESPIRATORY (INHALATION) at 21:32

## 2024-01-07 RX ADMIN — DOXYCYCLINE 100 MG: 100 INJECTION, POWDER, LYOPHILIZED, FOR SOLUTION INTRAVENOUS at 21:23

## 2024-01-07 RX ADMIN — GUAIFENESIN 600 MG: 600 TABLET, EXTENDED RELEASE ORAL at 21:08

## 2024-01-07 RX ADMIN — IPRATROPIUM BROMIDE AND ALBUTEROL SULFATE 1 DOSE: 2.5; .5 SOLUTION RESPIRATORY (INHALATION) at 14:59

## 2024-01-07 RX ADMIN — ALBUTEROL SULFATE 2.5 MG: 2.5 SOLUTION RESPIRATORY (INHALATION) at 15:38

## 2024-01-07 RX ADMIN — METHYLPREDNISOLONE SODIUM SUCCINATE 60 MG: 125 INJECTION INTRAMUSCULAR; INTRAVENOUS at 21:18

## 2024-01-07 RX ADMIN — ALBUTEROL SULFATE 2.5 MG: 2.5 SOLUTION RESPIRATORY (INHALATION) at 14:59

## 2024-01-07 RX ADMIN — IPRATROPIUM BROMIDE AND ALBUTEROL SULFATE 1 DOSE: 2.5; .5 SOLUTION RESPIRATORY (INHALATION) at 14:30

## 2024-01-07 RX ADMIN — PANTOPRAZOLE SODIUM 40 MG: 40 INJECTION, POWDER, FOR SOLUTION INTRAVENOUS at 21:17

## 2024-01-07 RX ADMIN — SODIUM CHLORIDE 1000 ML: 900 INJECTION, SOLUTION INTRAVENOUS at 16:18

## 2024-01-07 RX ADMIN — CEFTRIAXONE SODIUM 1000 MG: 1 INJECTION, POWDER, FOR SOLUTION INTRAMUSCULAR; INTRAVENOUS at 22:44

## 2024-01-07 RX ADMIN — DEXAMETHASONE SODIUM PHOSPHATE 10 MG: 10 INJECTION, SOLUTION INTRAMUSCULAR; INTRAVENOUS at 15:19

## 2024-01-07 ASSESSMENT — ENCOUNTER SYMPTOMS
WHEEZING: 1
NAUSEA: 0
SHORTNESS OF BREATH: 1
ALLERGIC/IMMUNOLOGIC NEGATIVE: 1
EYES NEGATIVE: 1
VOMITING: 0

## 2024-01-07 ASSESSMENT — PAIN - FUNCTIONAL ASSESSMENT
PAIN_FUNCTIONAL_ASSESSMENT: NONE - DENIES PAIN
PAIN_FUNCTIONAL_ASSESSMENT: NONE - DENIES PAIN

## 2024-01-07 NOTE — ED NOTES
Placed patient on 3LPM NC O2 per PA orders. Pt is resting comfortably on stretcher, updated on plan of care.  remains at bedside. Patient verbalizes no additional needs at this time.

## 2024-01-07 NOTE — ED PROVIDER NOTES
following components:    Glucose 103 (*)     All other components within normal limits   POC G3: BLOOD GASES INCLUDES CALC. TCO2, HCO3, BASE EXCESS, SO2 - Abnormal; Notable for the following components:    POC PO2 67 (*)     All other components within normal limits   COVID-19 & INFLUENZA COMBO   TROPONIN   MAGNESIUM   D-DIMER, QUANTITATIVE   TROPONIN       All other labs were within normal range or not returned as of this dictation.    EMERGENCY DEPARTMENT COURSE and DIFFERENTIAL DIAGNOSIS/MDM:   Vitals:    Vitals:    01/07/24 1643 01/07/24 1653 01/07/24 1703 01/07/24 1757   BP:       Pulse: (!) 126 (!) 127 (!) 126 (!) 123   Resp: 26 25 27 20   Temp:       TempSrc:       SpO2: 95% 98% 100% 99%   Weight:       Height:           Severe asthma exacerbation former heavy smoker.  Multiple nebs steroid mag fluids and patient is still hypoxic with a P a O2 of 67 placed on 2 L nasal cannula.  Asked hospitalist for admission.  No signs of COVID or pneumonia on x-ray.    Medical Decision Making  Amount and/or Complexity of Data Reviewed  Labs: ordered.  Radiology: ordered.  ECG/medicine tests: ordered.    Risk  Prescription drug management.  Decision regarding hospitalization.            REASSESSMENT          CRITICAL CARE TIME   Total Critical Care time was 45 minutes, excluding separately reportable procedures.  There was a high probability of clinically significant/life threatening deterioration in the patient's condition which required my urgent intervention.      CONSULTS:  IP CONSULT TO HOSPITALIST    PROCEDURES:  Unless otherwise noted below, none     Procedures    FINAL IMPRESSION      1. Hypoxia    2. Severe persistent asthma with exacerbation          DISPOSITION/PLAN   DISPOSITION Decision To Admit 01/07/2024 06:21:28 PM      PATIENT REFERRED TO:  No follow-up provider specified.    DISCHARGE MEDICATIONS:  New Prescriptions    No medications on file     Controlled Substances Monitoring:          No data to display

## 2024-01-07 NOTE — H&P
Allergies   Allergen Reactions    Diphenhydramine Shortness Of Breath     CP    Oxycodone-Acetaminophen Nausea And Vomiting    Acetaminophen Other (See Comments)     Causes upset stomach    Fluticasone-Salmeterol Nausea And Vomiting    Ibuprofen Nausea And Vomiting         Review of Systems  GENERAL: Patient alert, awake and oriented times 3, able to communicate full sentences and not in distress.   HEENT: No change in vision, no earache, tinnitus, sore throat or sinus congestion.   NECK: No pain or stiffness.   PULMONARY: + shortness of breath, +cough+ wheeze.   Cardiovascular: no pnd or orthopnea, no CP  GASTROINTESTINAL: No abdominal pain, nausea, vomiting or diarrhea, melena or bright red blood per rectum.   GENITOURINARY: No urinary frequency, urgency, hesitancy or dysuria.   MUSCULOSKELETAL: No joint or muscle pain, no back pain, no recent trauma.   DERMATOLOGIC: No rash, no itching, no lesions.   ENDOCRINE: No polyuria, polydipsia, no heat or cold intolerance. No recent change in weight.   HEMATOLOGICAL: No anemia or easy bruising or bleeding.   NEUROLOGIC: No headache, seizures, numbness, tingling or weakness.       Physical Exam:     Physical Exam:  BP (!) 135/92   Pulse (!) 123   Temp 98.4 °F (36.9 °C) (Temporal)   Resp 20   Ht 1.6 m (5' 3\")   Wt 80.7 kg (178 lb)   SpO2 99%   BMI 31.53 kg/m²         Temp (24hrs), Av.4 °F (36.9 °C), Min:98.4 °F (36.9 °C), Max:98.4 °F (36.9 °C)    No intake/output data recorded.   No intake/output data recorded.    General:  Alert, cooperative, no distress, appears stated age.  Mild tachypnea noted respiratory rates around 31 with movement              Head: Normocephalic, without obvious abnormality, atraumatic.   Eyes:  Conjunctivae/corneas clear. PERRL, EOMs intact.   Nose: Nares normal. No drainage or sinus tenderness.   Neck: Supple, symmetrical, trachea midline, no adenopathy, thyroid: no enlargement, no carotid bruit and no JVD.   Lungs:   Clear to

## 2024-01-08 ENCOUNTER — APPOINTMENT (OUTPATIENT)
Facility: HOSPITAL | Age: 54
DRG: 203 | End: 2024-01-08
Attending: INTERNAL MEDICINE
Payer: COMMERCIAL

## 2024-01-08 LAB
ALBUMIN SERPL-MCNC: 3.6 G/DL (ref 3.4–5)
ALBUMIN/GLOB SERPL: 1.1 (ref 0.8–1.7)
ALP SERPL-CCNC: 63 U/L (ref 45–117)
ALT SERPL-CCNC: 41 U/L (ref 13–56)
AMPHET UR QL SCN: NEGATIVE
ANION GAP SERPL CALC-SCNC: 8 MMOL/L (ref 3–18)
AST SERPL-CCNC: 13 U/L (ref 10–38)
BARBITURATES UR QL SCN: NEGATIVE
BENZODIAZ UR QL: NEGATIVE
BILIRUB SERPL-MCNC: 0.3 MG/DL (ref 0.2–1)
BUN SERPL-MCNC: 13 MG/DL (ref 7–18)
BUN/CREAT SERPL: 13 (ref 12–20)
CALCIUM SERPL-MCNC: 8.9 MG/DL (ref 8.5–10.1)
CANNABINOIDS UR QL SCN: NEGATIVE
CHLORIDE SERPL-SCNC: 110 MMOL/L (ref 100–111)
CO2 SERPL-SCNC: 24 MMOL/L (ref 21–32)
COCAINE UR QL SCN: NEGATIVE
CREAT SERPL-MCNC: 1.02 MG/DL (ref 0.6–1.3)
ECHO AO ROOT DIAM: 2.9 CM
ECHO AO ROOT INDEX: 1.58 CM/M2
ECHO AV AREA PEAK VELOCITY: 2.7 CM2
ECHO AV AREA VTI: 3.1 CM2
ECHO AV AREA/BSA PEAK VELOCITY: 1.5 CM2/M2
ECHO AV AREA/BSA VTI: 1.7 CM2/M2
ECHO AV MEAN GRADIENT: 8 MMHG
ECHO AV MEAN VELOCITY: 1.3 M/S
ECHO AV PEAK GRADIENT: 17 MMHG
ECHO AV PEAK VELOCITY: 2 M/S
ECHO AV VELOCITY RATIO: 0.75
ECHO AV VTI: 29.7 CM
ECHO BSA: 1.89 M2
ECHO EST RA PRESSURE: 8 MMHG
ECHO IVC PROX: 2.1 CM
ECHO LA DIAMETER INDEX: 1.74 CM/M2
ECHO LA DIAMETER: 3.2 CM
ECHO LA TO AORTIC ROOT RATIO: 1.1
ECHO LA VOL A-L A2C: 42 ML (ref 22–52)
ECHO LA VOL A-L A4C: 36 ML (ref 22–52)
ECHO LA VOL BP: 41 ML (ref 22–52)
ECHO LA VOL MOD A2C: 41 ML (ref 22–52)
ECHO LA VOL MOD A4C: 35 ML (ref 22–52)
ECHO LA VOL/BSA BIPLANE: 22 ML/M2 (ref 16–34)
ECHO LA VOLUME AREA LENGTH: 42 ML
ECHO LA VOLUME INDEX A-L A2C: 23 ML/M2 (ref 16–34)
ECHO LA VOLUME INDEX A-L A4C: 20 ML/M2 (ref 16–34)
ECHO LA VOLUME INDEX AREA LENGTH: 23 ML/M2 (ref 16–34)
ECHO LA VOLUME INDEX MOD A2C: 22 ML/M2 (ref 16–34)
ECHO LA VOLUME INDEX MOD A4C: 19 ML/M2 (ref 16–34)
ECHO LV EDV A2C: 58 ML
ECHO LV EDV A4C: 69 ML
ECHO LV EDV BP: 65 ML (ref 56–104)
ECHO LV EDV INDEX A4C: 38 ML/M2
ECHO LV EDV INDEX BP: 35 ML/M2
ECHO LV EDV NDEX A2C: 32 ML/M2
ECHO LV EJECTION FRACTION A2C: 67 %
ECHO LV EJECTION FRACTION A4C: 58 %
ECHO LV EJECTION FRACTION BIPLANE: 61 % (ref 55–100)
ECHO LV ESV A2C: 19 ML
ECHO LV ESV A4C: 29 ML
ECHO LV ESV BP: 25 ML (ref 19–49)
ECHO LV ESV INDEX A2C: 10 ML/M2
ECHO LV ESV INDEX A4C: 16 ML/M2
ECHO LV ESV INDEX BP: 14 ML/M2
ECHO LV FRACTIONAL SHORTENING: 46 % (ref 28–44)
ECHO LV INTERNAL DIMENSION DIASTOLE INDEX: 2.83 CM/M2
ECHO LV INTERNAL DIMENSION DIASTOLIC: 5.2 CM (ref 3.9–5.3)
ECHO LV INTERNAL DIMENSION SYSTOLIC INDEX: 1.52 CM/M2
ECHO LV INTERNAL DIMENSION SYSTOLIC: 2.8 CM
ECHO LV IVSD: 1 CM (ref 0.6–0.9)
ECHO LV MASS 2D: 194.2 G (ref 67–162)
ECHO LV MASS INDEX 2D: 105.5 G/M2 (ref 43–95)
ECHO LV POSTERIOR WALL DIASTOLIC: 1 CM (ref 0.6–0.9)
ECHO LV RELATIVE WALL THICKNESS RATIO: 0.38
ECHO LVOT AREA: 3.8 CM2
ECHO LVOT AV VTI INDEX: 0.83
ECHO LVOT DIAM: 2.2 CM
ECHO LVOT MEAN GRADIENT: 4 MMHG
ECHO LVOT PEAK GRADIENT: 9 MMHG
ECHO LVOT PEAK VELOCITY: 1.5 M/S
ECHO LVOT STROKE VOLUME INDEX: 51 ML/M2
ECHO LVOT SV: 93.8 ML
ECHO LVOT VTI: 24.7 CM
ECHO MV A VELOCITY: 1.09 M/S
ECHO MV AREA VTI: 4.4 CM2
ECHO MV E DECELERATION TIME (DT): 99.7 MS
ECHO MV E VELOCITY: 0.69 M/S
ECHO MV E/A RATIO: 0.63
ECHO MV LVOT VTI INDEX: 0.85
ECHO MV MAX VELOCITY: 1.3 M/S
ECHO MV MEAN GRADIENT: 3 MMHG
ECHO MV MEAN VELOCITY: 0.8 M/S
ECHO MV PEAK GRADIENT: 7 MMHG
ECHO MV VTI: 21.1 CM
ECHO RA END SYSTOLIC VOLUME APICAL 4 CHAMBER INDEX BSA: 10 ML/M2
ECHO RA VOLUME: 18 ML
ECHO RV INTERNAL DIMENSION: 2.2 CM
ECHO RV TAPSE: 1.8 CM (ref 1.7–?)
ERYTHROCYTE [DISTWIDTH] IN BLOOD BY AUTOMATED COUNT: 14.4 % (ref 11.6–14.5)
GLOBULIN SER CALC-MCNC: 3.3 G/DL (ref 2–4)
GLUCOSE SERPL-MCNC: 152 MG/DL (ref 74–99)
HCT VFR BLD AUTO: 38.5 % (ref 35–45)
HGB BLD-MCNC: 12.7 G/DL (ref 12–16)
L PNEUMO AG UR QL IA: NEGATIVE
Lab: NORMAL
MAGNESIUM SERPL-MCNC: 2.4 MG/DL (ref 1.6–2.6)
MCH RBC QN AUTO: 30.5 PG (ref 24–34)
MCHC RBC AUTO-ENTMCNC: 33 G/DL (ref 31–37)
MCV RBC AUTO: 92.3 FL (ref 78–100)
METHADONE UR QL: NEGATIVE
NRBC # BLD: 0 K/UL (ref 0–0.01)
NRBC BLD-RTO: 0 PER 100 WBC
OPIATES UR QL: NEGATIVE
PCP UR QL: NEGATIVE
PLATELET # BLD AUTO: 217 K/UL (ref 135–420)
PMV BLD AUTO: 8.6 FL (ref 9.2–11.8)
POTASSIUM SERPL-SCNC: 4.3 MMOL/L (ref 3.5–5.5)
PROT SERPL-MCNC: 6.9 G/DL (ref 6.4–8.2)
RBC # BLD AUTO: 4.17 M/UL (ref 4.2–5.3)
S PNEUM AG UR QL: NEGATIVE
SODIUM SERPL-SCNC: 142 MMOL/L (ref 136–145)
TROPONIN I SERPL HS-MCNC: 5 NG/L (ref 0–54)
TROPONIN I SERPL HS-MCNC: 5 NG/L (ref 0–54)
TROPONIN I SERPL HS-MCNC: 6 NG/L (ref 0–54)
TSH SERPL DL<=0.05 MIU/L-ACNC: 0.56 UIU/ML (ref 0.36–3.74)
WBC # BLD AUTO: 7.9 K/UL (ref 4.6–13.2)

## 2024-01-08 PROCEDURE — 87449 NOS EACH ORGANISM AG IA: CPT

## 2024-01-08 PROCEDURE — 87070 CULTURE OTHR SPECIMN AEROBIC: CPT

## 2024-01-08 PROCEDURE — 2500000003 HC RX 250 WO HCPCS: Performed by: INTERNAL MEDICINE

## 2024-01-08 PROCEDURE — C9113 INJ PANTOPRAZOLE SODIUM, VIA: HCPCS | Performed by: INTERNAL MEDICINE

## 2024-01-08 PROCEDURE — 2700000000 HC OXYGEN THERAPY PER DAY

## 2024-01-08 PROCEDURE — 93306 TTE W/DOPPLER COMPLETE: CPT

## 2024-01-08 PROCEDURE — 94640 AIRWAY INHALATION TREATMENT: CPT

## 2024-01-08 PROCEDURE — 1100000003 HC PRIVATE W/ TELEMETRY

## 2024-01-08 PROCEDURE — 36415 COLL VENOUS BLD VENIPUNCTURE: CPT

## 2024-01-08 PROCEDURE — A4216 STERILE WATER/SALINE, 10 ML: HCPCS | Performed by: INTERNAL MEDICINE

## 2024-01-08 PROCEDURE — 2580000003 HC RX 258: Performed by: INTERNAL MEDICINE

## 2024-01-08 PROCEDURE — 6370000000 HC RX 637 (ALT 250 FOR IP): Performed by: HOSPITALIST

## 2024-01-08 PROCEDURE — 85027 COMPLETE CBC AUTOMATED: CPT

## 2024-01-08 PROCEDURE — 6360000002 HC RX W HCPCS: Performed by: INTERNAL MEDICINE

## 2024-01-08 PROCEDURE — 87205 SMEAR GRAM STAIN: CPT

## 2024-01-08 PROCEDURE — 6370000000 HC RX 637 (ALT 250 FOR IP): Performed by: INTERNAL MEDICINE

## 2024-01-08 PROCEDURE — 6360000002 HC RX W HCPCS: Performed by: HOSPITALIST

## 2024-01-08 PROCEDURE — 84484 ASSAY OF TROPONIN QUANT: CPT

## 2024-01-08 PROCEDURE — 80053 COMPREHEN METABOLIC PANEL: CPT

## 2024-01-08 PROCEDURE — 84443 ASSAY THYROID STIM HORMONE: CPT

## 2024-01-08 PROCEDURE — 80307 DRUG TEST PRSMV CHEM ANLYZR: CPT

## 2024-01-08 PROCEDURE — 2580000003 HC RX 258: Performed by: HOSPITALIST

## 2024-01-08 PROCEDURE — 83735 ASSAY OF MAGNESIUM: CPT

## 2024-01-08 RX ORDER — SODIUM CHLORIDE 9 MG/ML
INJECTION, SOLUTION INTRAVENOUS PRN
Status: DISCONTINUED | OUTPATIENT
Start: 2024-01-08 | End: 2024-01-09 | Stop reason: HOSPADM

## 2024-01-08 RX ORDER — LEVALBUTEROL INHALATION SOLUTION 0.63 MG/3ML
0.63 SOLUTION RESPIRATORY (INHALATION) EVERY 8 HOURS PRN
Status: DISCONTINUED | OUTPATIENT
Start: 2024-01-08 | End: 2024-01-09 | Stop reason: HOSPADM

## 2024-01-08 RX ORDER — LORAZEPAM 1 MG/1
4 TABLET ORAL
Status: DISCONTINUED | OUTPATIENT
Start: 2024-01-08 | End: 2024-01-09 | Stop reason: HOSPADM

## 2024-01-08 RX ORDER — CLONAZEPAM 0.5 MG/1
0.25 TABLET ORAL EVERY 12 HOURS
Status: DISCONTINUED | OUTPATIENT
Start: 2024-01-08 | End: 2024-01-09 | Stop reason: HOSPADM

## 2024-01-08 RX ORDER — LORAZEPAM 2 MG/ML
3 INJECTION INTRAMUSCULAR
Status: DISCONTINUED | OUTPATIENT
Start: 2024-01-08 | End: 2024-01-09 | Stop reason: HOSPADM

## 2024-01-08 RX ORDER — GAUZE BANDAGE 2" X 2"
100 BANDAGE TOPICAL DAILY
Status: DISCONTINUED | OUTPATIENT
Start: 2024-01-08 | End: 2024-01-09 | Stop reason: HOSPADM

## 2024-01-08 RX ORDER — SODIUM CHLORIDE 0.9 % (FLUSH) 0.9 %
5-40 SYRINGE (ML) INJECTION EVERY 12 HOURS SCHEDULED
Status: DISCONTINUED | OUTPATIENT
Start: 2024-01-08 | End: 2024-01-09 | Stop reason: HOSPADM

## 2024-01-08 RX ORDER — LORAZEPAM 2 MG/ML
2 INJECTION INTRAMUSCULAR
Status: DISCONTINUED | OUTPATIENT
Start: 2024-01-08 | End: 2024-01-09 | Stop reason: HOSPADM

## 2024-01-08 RX ORDER — MONTELUKAST SODIUM 10 MG/1
10 TABLET ORAL NIGHTLY
Status: DISCONTINUED | OUTPATIENT
Start: 2024-01-08 | End: 2024-01-09 | Stop reason: HOSPADM

## 2024-01-08 RX ORDER — LORAZEPAM 2 MG/ML
4 INJECTION INTRAMUSCULAR
Status: DISCONTINUED | OUTPATIENT
Start: 2024-01-08 | End: 2024-01-09 | Stop reason: HOSPADM

## 2024-01-08 RX ORDER — SODIUM CHLORIDE 0.9 % (FLUSH) 0.9 %
5-40 SYRINGE (ML) INJECTION PRN
Status: DISCONTINUED | OUTPATIENT
Start: 2024-01-08 | End: 2024-01-09 | Stop reason: HOSPADM

## 2024-01-08 RX ORDER — LORAZEPAM 1 MG/1
2 TABLET ORAL
Status: DISCONTINUED | OUTPATIENT
Start: 2024-01-08 | End: 2024-01-09 | Stop reason: HOSPADM

## 2024-01-08 RX ORDER — LORAZEPAM 1 MG/1
1 TABLET ORAL
Status: DISCONTINUED | OUTPATIENT
Start: 2024-01-08 | End: 2024-01-09 | Stop reason: HOSPADM

## 2024-01-08 RX ORDER — LORAZEPAM 2 MG/ML
1 INJECTION INTRAMUSCULAR
Status: DISCONTINUED | OUTPATIENT
Start: 2024-01-08 | End: 2024-01-09 | Stop reason: HOSPADM

## 2024-01-08 RX ORDER — LORAZEPAM 1 MG/1
3 TABLET ORAL
Status: DISCONTINUED | OUTPATIENT
Start: 2024-01-08 | End: 2024-01-09 | Stop reason: HOSPADM

## 2024-01-08 RX ADMIN — Medication 400 MG: at 19:52

## 2024-01-08 RX ADMIN — ALPRAZOLAM 0.5 MG: 0.5 TABLET ORAL at 19:54

## 2024-01-08 RX ADMIN — BUDESONIDE 500 MCG: 0.5 SUSPENSION RESPIRATORY (INHALATION) at 19:25

## 2024-01-08 RX ADMIN — ARFORMOTEROL TARTRATE 15 MCG: 15 SOLUTION RESPIRATORY (INHALATION) at 19:26

## 2024-01-08 RX ADMIN — PANTOPRAZOLE SODIUM 40 MG: 40 INJECTION, POWDER, FOR SOLUTION INTRAVENOUS at 08:12

## 2024-01-08 RX ADMIN — CLONAZEPAM 0.25 MG: 0.5 TABLET ORAL at 13:32

## 2024-01-08 RX ADMIN — CHOLECALCIFEROL TAB 25 MCG (1000 UNIT) 3000 UNITS: 25 TAB at 08:11

## 2024-01-08 RX ADMIN — DILTIAZEM HYDROCHLORIDE 240 MG: 240 CAPSULE, EXTENDED RELEASE ORAL at 18:14

## 2024-01-08 RX ADMIN — METHYLPREDNISOLONE SODIUM SUCCINATE 40 MG: 40 INJECTION INTRAMUSCULAR; INTRAVENOUS at 18:09

## 2024-01-08 RX ADMIN — CYANOCOBALAMIN TAB 1000 MCG 1000 MCG: 1000 TAB at 08:12

## 2024-01-08 RX ADMIN — Medication 100 MG: at 13:33

## 2024-01-08 RX ADMIN — METHYLPREDNISOLONE SODIUM SUCCINATE 60 MG: 125 INJECTION INTRAMUSCULAR; INTRAVENOUS at 02:54

## 2024-01-08 RX ADMIN — IPRATROPIUM BROMIDE AND ALBUTEROL SULFATE 1 DOSE: 2.5; .5 SOLUTION RESPIRATORY (INHALATION) at 16:30

## 2024-01-08 RX ADMIN — GUAIFENESIN 600 MG: 600 TABLET, EXTENDED RELEASE ORAL at 19:52

## 2024-01-08 RX ADMIN — EZETIMIBE 10 MG: 10 TABLET ORAL at 08:11

## 2024-01-08 RX ADMIN — IPRATROPIUM BROMIDE AND ALBUTEROL SULFATE 1 DOSE: 2.5; .5 SOLUTION RESPIRATORY (INHALATION) at 12:18

## 2024-01-08 RX ADMIN — DOXYCYCLINE 100 MG: 100 INJECTION, POWDER, LYOPHILIZED, FOR SOLUTION INTRAVENOUS at 21:15

## 2024-01-08 RX ADMIN — MONTELUKAST 10 MG: 10 TABLET, FILM COATED ORAL at 19:52

## 2024-01-08 RX ADMIN — SODIUM CHLORIDE, PRESERVATIVE FREE 10 ML: 5 INJECTION INTRAVENOUS at 08:13

## 2024-01-08 RX ADMIN — VENLAFAXINE 37.5 MG: 37.5 TABLET ORAL at 19:51

## 2024-01-08 RX ADMIN — BUDESONIDE 500 MCG: 0.5 SUSPENSION RESPIRATORY (INHALATION) at 09:34

## 2024-01-08 RX ADMIN — GUAIFENESIN 600 MG: 600 TABLET, EXTENDED RELEASE ORAL at 08:12

## 2024-01-08 RX ADMIN — SODIUM CHLORIDE, PRESERVATIVE FREE 10 ML: 5 INJECTION INTRAVENOUS at 19:51

## 2024-01-08 RX ADMIN — IPRATROPIUM BROMIDE AND ALBUTEROL SULFATE 1 DOSE: 2.5; .5 SOLUTION RESPIRATORY (INHALATION) at 19:25

## 2024-01-08 RX ADMIN — CEFTRIAXONE SODIUM 1000 MG: 1 INJECTION, POWDER, FOR SOLUTION INTRAMUSCULAR; INTRAVENOUS at 18:09

## 2024-01-08 RX ADMIN — ENOXAPARIN SODIUM 40 MG: 100 INJECTION SUBCUTANEOUS at 18:09

## 2024-01-08 RX ADMIN — METHYLPREDNISOLONE SODIUM SUCCINATE 60 MG: 125 INJECTION INTRAMUSCULAR; INTRAVENOUS at 11:05

## 2024-01-08 RX ADMIN — ARFORMOTEROL TARTRATE 15 MCG: 15 SOLUTION RESPIRATORY (INHALATION) at 09:34

## 2024-01-08 RX ADMIN — IPRATROPIUM BROMIDE AND ALBUTEROL SULFATE 1 DOSE: 2.5; .5 SOLUTION RESPIRATORY (INHALATION) at 09:34

## 2024-01-08 RX ADMIN — VENLAFAXINE 37.5 MG: 37.5 TABLET ORAL at 08:12

## 2024-01-08 RX ADMIN — FLUTICASONE PROPIONATE 2 SPRAY: 50 SPRAY, METERED NASAL at 11:05

## 2024-01-08 RX ADMIN — DOXYCYCLINE 100 MG: 100 INJECTION, POWDER, LYOPHILIZED, FOR SOLUTION INTRAVENOUS at 08:12

## 2024-01-08 NOTE — CARE COORDINATION
Case Management Assessment  Initial Evaluation    Date/Time of Evaluation: 1/8/2024 11:24 AM  Assessment Completed by: Cleve Vela RN    If patient is discharged prior to next notation, then this note serves as note for discharge by case management.    Patient Name: Dariana Boo                   YOB: 1970  Diagnosis: Hypoxemia [R09.02]  Hypoxia [R09.02]  Severe persistent asthma with exacerbation [J45.51]  Acute respiratory failure with hypoxia (HCC) [J96.01]                   Date / Time: 1/7/2024  2:18 PM    Patient Admission Status: Inpatient   Readmission Risk (Low < 19, Mod (19-27), High > 27): Readmission Risk Score: 7.8    Current PCP: Bela Perales MD  PCP verified by CM? Yes    Chart Reviewed: Yes      History Provided by: Patient  Patient Orientation: Alert and Oriented    Patient Cognition: Alert    Hospitalization in the last 30 days (Readmission):  No    If yes, Readmission Assessment in CM Navigator will be completed.    Advance Directives:      Code Status: Full Code   Patient's Primary Decision Maker is:        Discharge Planning:    Patient lives with: Spouse/Significant Other Type of Home: House  Primary Care Giver: Self  Patient Support Systems include: Spouse/Significant Other   Current Financial resources: None  Current community resources: None  Current services prior to admission: None            Current DME:              Type of Home Care services:  None    ADLS  Prior functional level: Independent in ADLs/IADLs  Current functional level: Independent in ADLs/IADLs    PT AM-PAC:   /24  OT AM-PAC:   /24    Family can provide assistance at DC: Yes  Would you like Case Management to discuss the discharge plan with any other family members/significant others, and if so, who?    Plans to Return to Present Housing: Yes  Other Identified Issues/Barriers to RETURNING to current housing: None  Potential Assistance needed at discharge: N/A            Potential DME:    Patient

## 2024-01-08 NOTE — ED NOTES
TRANSFER - OUT REPORT:    Verbal report given to PRACHI Maher  on Dariana Boo  being transferred to [facility] for routine progression of patient care       Report consisted of patient's Situation, Background, Assessment and   Recommendations(SBAR).     Information from the following report(s) ED SBAR was reviewed with the receiving nurse.    Karli Fall Assessment:    Presents to emergency department  because of falls (Syncope, seizure, or loss of consciousness): No  Age > 70: No  Altered Mental Status, Intoxication with alcohol or substance confusion (Disorientation, impaired judgment, poor safety awaremess, or inability to follow instructions): No  Impaired Mobility: Ambulates or transfers with assistive devices or assistance; Unable to ambulate or transer.: No  Nursing Judgement: No          Lines:   Peripheral IV 01/07/24 Left Antecubital (Active)   Site Assessment Clean, dry & intact 01/07/24 1513   Infiltration Assessment 0 01/07/24 1513   Dressing Type Transparent 01/07/24 1513        Opportunity for questions and clarification was provided.      Patient transported with:  Registered Nurse

## 2024-01-08 NOTE — CONSULTS
[facility]                  Pulmonary, Critical Care, and Sleep Medicine     Name: Dariana Boo MRN: [id number]   : 1970 Hospital: [facility]    Date: 2024        PCCM Note                                              Consult requesting physician: Dr. Jerzy Matamoros  Reason for Consult: Asthma exacerbation      IMPRESSION:         Active Hospital Problems    Diagnosis Date Noted    Acute asthma exacerbation [J45.901] 2024    Hypoxic respiratory failure (HCC) [J96.91] 2024    Hypoxemia [R09.02] 2024    GERD (gastroesophageal reflux disease) [K21.9] 10/27/2020    Left foot drop [M21.372] 2018    Myofascial pain [M79.18] 2017        Code status: Full Code       RECOMMENDATIONS:     History of former smoker, seasonal allergic rhinitis, GERD, asthma cervical admitted with asthma exacerbation with tachycardia and hypoxemia; failed outpatient multiple asthma exacerbation treatment.  Two ER visit and one PCP visit for asthma exacerbation; received 1 antibiotic and 3 courses of prednisone.    Used to follow Dr. Hope but has not seen him since 1 to 2 years; advised compliance and follow-up.    CTA chest 2024: No PE.  No acute process in the chest.  Echo 2024: Pending.  Prior echo 10/27/2020: LVEF 55 to 60%.  LHC 2022: RCA 30% disease.    Rapid influenza and COVID-19 2024: Negative.  Urine Legionella antigen 2024: In process.  No leukocytosis or fever.  Send sputum culture; patient has collected.    Hypoxemia:  Hypoxemia due to asthma exacerbation; significantly improving.  On O2 1 LPM; titrate to keep SpO2 > 91%.  6-minute walk test before discharge.    Bronchodilators: Continue Brovana nebs twice daily, Pulmicort nebs twice daily, DuoNeb 4 times daily.  Start Xopenex as needed.  Steroids: Continue Solu-Medrol 40 mg IV every 8 hours and further titrate per clinical course.  Continue Mucinex.  Start Singulair 10

## 2024-01-09 VITALS
TEMPERATURE: 98.6 F | HEART RATE: 115 BPM | HEIGHT: 63 IN | BODY MASS INDEX: 31.54 KG/M2 | OXYGEN SATURATION: 97 % | WEIGHT: 178 LBS | RESPIRATION RATE: 19 BRPM | DIASTOLIC BLOOD PRESSURE: 68 MMHG | SYSTOLIC BLOOD PRESSURE: 135 MMHG

## 2024-01-09 LAB
ALBUMIN SERPL-MCNC: 3.4 G/DL (ref 3.4–5)
ALBUMIN/GLOB SERPL: 1.1 (ref 0.8–1.7)
ALP SERPL-CCNC: 54 U/L (ref 45–117)
ALT SERPL-CCNC: 34 U/L (ref 13–56)
ANION GAP SERPL CALC-SCNC: 6 MMOL/L (ref 3–18)
AST SERPL-CCNC: 12 U/L (ref 10–38)
BILIRUB SERPL-MCNC: 0.3 MG/DL (ref 0.2–1)
BUN SERPL-MCNC: 14 MG/DL (ref 7–18)
BUN/CREAT SERPL: 16 (ref 12–20)
CALCIUM SERPL-MCNC: 9.2 MG/DL (ref 8.5–10.1)
CHLORIDE SERPL-SCNC: 112 MMOL/L (ref 100–111)
CO2 SERPL-SCNC: 26 MMOL/L (ref 21–32)
CREAT SERPL-MCNC: 0.88 MG/DL (ref 0.6–1.3)
ERYTHROCYTE [DISTWIDTH] IN BLOOD BY AUTOMATED COUNT: 14.6 % (ref 11.6–14.5)
GLOBULIN SER CALC-MCNC: 3.1 G/DL (ref 2–4)
GLUCOSE SERPL-MCNC: 150 MG/DL (ref 74–99)
HCT VFR BLD AUTO: 36.7 % (ref 35–45)
HGB BLD-MCNC: 12 G/DL (ref 12–16)
MAGNESIUM SERPL-MCNC: 2.4 MG/DL (ref 1.6–2.6)
MCH RBC QN AUTO: 30.5 PG (ref 24–34)
MCHC RBC AUTO-ENTMCNC: 32.7 G/DL (ref 31–37)
MCV RBC AUTO: 93.4 FL (ref 78–100)
NRBC # BLD: 0 K/UL (ref 0–0.01)
NRBC BLD-RTO: 0 PER 100 WBC
PLATELET # BLD AUTO: 231 K/UL (ref 135–420)
PMV BLD AUTO: 8.8 FL (ref 9.2–11.8)
POTASSIUM SERPL-SCNC: 4.3 MMOL/L (ref 3.5–5.5)
PROT SERPL-MCNC: 6.5 G/DL (ref 6.4–8.2)
RBC # BLD AUTO: 3.93 M/UL (ref 4.2–5.3)
SODIUM SERPL-SCNC: 144 MMOL/L (ref 136–145)
WBC # BLD AUTO: 16.1 K/UL (ref 4.6–13.2)

## 2024-01-09 PROCEDURE — 2580000003 HC RX 258: Performed by: INTERNAL MEDICINE

## 2024-01-09 PROCEDURE — 36415 COLL VENOUS BLD VENIPUNCTURE: CPT

## 2024-01-09 PROCEDURE — 83735 ASSAY OF MAGNESIUM: CPT

## 2024-01-09 PROCEDURE — 80053 COMPREHEN METABOLIC PANEL: CPT

## 2024-01-09 PROCEDURE — 6370000000 HC RX 637 (ALT 250 FOR IP): Performed by: HOSPITALIST

## 2024-01-09 PROCEDURE — 2580000003 HC RX 258: Performed by: HOSPITALIST

## 2024-01-09 PROCEDURE — 2500000003 HC RX 250 WO HCPCS: Performed by: INTERNAL MEDICINE

## 2024-01-09 PROCEDURE — 6370000000 HC RX 637 (ALT 250 FOR IP): Performed by: INTERNAL MEDICINE

## 2024-01-09 PROCEDURE — A4216 STERILE WATER/SALINE, 10 ML: HCPCS | Performed by: INTERNAL MEDICINE

## 2024-01-09 PROCEDURE — 85027 COMPLETE CBC AUTOMATED: CPT

## 2024-01-09 PROCEDURE — 6360000002 HC RX W HCPCS: Performed by: HOSPITALIST

## 2024-01-09 PROCEDURE — 94640 AIRWAY INHALATION TREATMENT: CPT

## 2024-01-09 PROCEDURE — 6360000002 HC RX W HCPCS: Performed by: INTERNAL MEDICINE

## 2024-01-09 PROCEDURE — C9113 INJ PANTOPRAZOLE SODIUM, VIA: HCPCS | Performed by: INTERNAL MEDICINE

## 2024-01-09 RX ORDER — ALBUTEROL SULFATE 90 UG/1
2 AEROSOL, METERED RESPIRATORY (INHALATION) 4 TIMES DAILY PRN
Qty: 18 G | Refills: 0 | Status: SHIPPED | OUTPATIENT
Start: 2024-01-09

## 2024-01-09 RX ORDER — FLUTICASONE PROPIONATE 50 MCG
2 SPRAY, SUSPENSION (ML) NASAL DAILY
Qty: 16 G | Refills: 0 | Status: SHIPPED | OUTPATIENT
Start: 2024-01-10

## 2024-01-09 RX ORDER — CODEINE PHOSPHATE AND GUAIFENESIN 10; 100 MG/5ML; MG/5ML
5 SOLUTION ORAL EVERY 4 HOURS PRN
Qty: 118 ML | Refills: 0 | Status: SHIPPED | OUTPATIENT
Start: 2024-01-09 | End: 2024-01-13

## 2024-01-09 RX ORDER — LEVOFLOXACIN 250 MG/1
500 TABLET, FILM COATED ORAL DAILY
Qty: 5 TABLET | Refills: 0 | Status: SHIPPED | OUTPATIENT
Start: 2024-01-09 | End: 2024-01-09

## 2024-01-09 RX ORDER — MONTELUKAST SODIUM 10 MG/1
10 TABLET ORAL DAILY
Qty: 30 TABLET | Refills: 0 | Status: SHIPPED | OUTPATIENT
Start: 2024-01-09

## 2024-01-09 RX ORDER — PANTOPRAZOLE SODIUM 20 MG/1
20 TABLET, DELAYED RELEASE ORAL DAILY
Qty: 30 TABLET | Refills: 0 | Status: SHIPPED | OUTPATIENT
Start: 2024-01-09

## 2024-01-09 RX ORDER — PREDNISONE 10 MG/1
TABLET ORAL
Qty: 40 TABLET | Refills: 0 | Status: SHIPPED | OUTPATIENT
Start: 2024-01-09

## 2024-01-09 RX ORDER — FLUTICASONE FUROATE, UMECLIDINIUM BROMIDE AND VILANTEROL TRIFENATATE 200; 62.5; 25 UG/1; UG/1; UG/1
1 POWDER RESPIRATORY (INHALATION) DAILY
Qty: 1 EACH | Refills: 0 | Status: SHIPPED | OUTPATIENT
Start: 2024-01-09

## 2024-01-09 RX ORDER — GUAIFENESIN 600 MG/1
600 TABLET, EXTENDED RELEASE ORAL 2 TIMES DAILY
Qty: 10 TABLET | Refills: 0 | Status: SHIPPED | OUTPATIENT
Start: 2024-01-09

## 2024-01-09 RX ORDER — LEVOFLOXACIN 250 MG/1
500 TABLET, FILM COATED ORAL DAILY
Qty: 10 TABLET | Refills: 0 | Status: SHIPPED | OUTPATIENT
Start: 2024-01-09 | End: 2024-01-14

## 2024-01-09 RX ORDER — CODEINE PHOSPHATE AND GUAIFENESIN 10; 100 MG/5ML; MG/5ML
5 SOLUTION ORAL EVERY 4 HOURS PRN
Status: DISCONTINUED | OUTPATIENT
Start: 2024-01-09 | End: 2024-01-09 | Stop reason: HOSPADM

## 2024-01-09 RX ADMIN — METHYLPREDNISOLONE SODIUM SUCCINATE 40 MG: 40 INJECTION INTRAMUSCULAR; INTRAVENOUS at 02:48

## 2024-01-09 RX ADMIN — GUAIFENESIN AND CODEINE PHOSPHATE 5 ML: 100; 10 SOLUTION ORAL at 10:29

## 2024-01-09 RX ADMIN — SODIUM CHLORIDE, PRESERVATIVE FREE 10 ML: 5 INJECTION INTRAVENOUS at 08:02

## 2024-01-09 RX ADMIN — IPRATROPIUM BROMIDE AND ALBUTEROL SULFATE 1 DOSE: 2.5; .5 SOLUTION RESPIRATORY (INHALATION) at 15:31

## 2024-01-09 RX ADMIN — GUAIFENESIN 600 MG: 600 TABLET, EXTENDED RELEASE ORAL at 08:00

## 2024-01-09 RX ADMIN — Medication 100 MG: at 08:01

## 2024-01-09 RX ADMIN — CLONAZEPAM 0.25 MG: 0.5 TABLET ORAL at 02:45

## 2024-01-09 RX ADMIN — IPRATROPIUM BROMIDE AND ALBUTEROL SULFATE 1 DOSE: 2.5; .5 SOLUTION RESPIRATORY (INHALATION) at 11:32

## 2024-01-09 RX ADMIN — FLUTICASONE PROPIONATE 2 SPRAY: 50 SPRAY, METERED NASAL at 08:02

## 2024-01-09 RX ADMIN — CLONAZEPAM 0.25 MG: 0.5 TABLET ORAL at 13:38

## 2024-01-09 RX ADMIN — PANTOPRAZOLE SODIUM 40 MG: 40 INJECTION, POWDER, FOR SOLUTION INTRAVENOUS at 07:59

## 2024-01-09 RX ADMIN — SODIUM CHLORIDE, PRESERVATIVE FREE 10 ML: 5 INJECTION INTRAVENOUS at 08:04

## 2024-01-09 RX ADMIN — VENLAFAXINE 37.5 MG: 37.5 TABLET ORAL at 08:01

## 2024-01-09 RX ADMIN — CHOLECALCIFEROL TAB 25 MCG (1000 UNIT) 3000 UNITS: 25 TAB at 07:59

## 2024-01-09 RX ADMIN — METHYLPREDNISOLONE SODIUM SUCCINATE 40 MG: 40 INJECTION INTRAMUSCULAR; INTRAVENOUS at 10:29

## 2024-01-09 RX ADMIN — EZETIMIBE 10 MG: 10 TABLET ORAL at 08:00

## 2024-01-09 RX ADMIN — DOXYCYCLINE 100 MG: 100 INJECTION, POWDER, LYOPHILIZED, FOR SOLUTION INTRAVENOUS at 07:58

## 2024-01-09 RX ADMIN — CYANOCOBALAMIN TAB 1000 MCG 1000 MCG: 1000 TAB at 10:29

## 2024-01-09 NOTE — DISCHARGE SUMMARY
Discharge Summary    Patient: Dariana Boo MRN: [id number]  CSN: [id number]    YOB: 1970  Age: 53 y.o.  Sex: female    DOA: 1/7/2024 LOS:  LOS: 2 days   Discharge Date:      Primary Care Provider:  Bela Perales MD    Admission Diagnoses: Hypoxemia [R09.02]  Hypoxia [R09.02]  Severe persistent asthma with exacerbation [J45.51]  Acute respiratory failure with hypoxia (HCC) [J96.01]    Discharge Diagnoses:    Active Hospital Problems    Diagnosis Date Noted    Acute asthma exacerbation [J45.901] 01/07/2024    Hypoxic respiratory failure (HCC) [J96.91] 01/07/2024    Hypoxemia [R09.02] 01/07/2024    GERD (gastroesophageal reflux disease) [K21.9] 10/27/2020    Left foot drop [M21.372] 01/25/2018    Myofascial pain [M79.18] 07/14/2017       Discharge Condition: stable     Discharge Medications:        Medication List        START taking these medications      * albuterol sulfate  (90 Base) MCG/ACT inhaler  Commonly known as: Ventolin HFA  Inhale 2 puffs into the lungs 4 times daily as needed for Wheezing     * albuterol (5 MG/ML) 0.5% nebulizer solution  Commonly known as: PROVENTIL  Take 1 mL by nebulization 4 times daily as needed for Wheezing     fluticasone 50 MCG/ACT nasal spray  Commonly known as: FLONASE  2 sprays by Each Nostril route daily  Start taking on: January 10, 2024     guaiFENesin 600 MG extended release tablet  Commonly known as: MUCINEX  Take 1 tablet by mouth 2 times daily     guaiFENesin-codeine 100-10 MG/5ML liquid  Commonly known as: GUAIFENESIN AC  Take 5 mLs by mouth every 4 hours as needed for Cough for up to 4 days. Max Daily Amount: 30 mLs     levoFLOXacin 250 MG tablet  Commonly known as: Levaquin  Take 2 tablets by mouth daily for 5 days     montelukast 10 MG tablet  Commonly known as: SINGULAIR  Take 1 tablet by mouth daily     pantoprazole 20 MG tablet  Commonly known as: Protonix  Take 1 tablet by mouth daily     predniSONE 10 MG tablet  Commonly known as:

## 2024-01-09 NOTE — PLAN OF CARE
Problem: Discharge Planning  Goal: Discharge to home or other facility with appropriate resources  1/8/2024 1949 by Pina Chen, RN  Outcome: Progressing  1/8/2024 1948 by Pina Chen, RN  Outcome: Progressing  Flowsheets (Taken 1/8/2024 0710 by Venecia Zeng RN)  Discharge to home or other facility with appropriate resources:   Identify barriers to discharge with patient and caregiver   Arrange for needed discharge resources and transportation as appropriate     Problem: Safety - Adult  Goal: Free from fall injury  Outcome: Progressing     Problem: Chronic Conditions and Co-morbidities  Goal: Patient's chronic conditions and co-morbidity symptoms are monitored and maintained or improved  Outcome: Progressing

## 2024-01-10 ENCOUNTER — CARE COORDINATION (OUTPATIENT)
Dept: OTHER | Facility: CLINIC | Age: 54
End: 2024-01-10

## 2024-01-10 LAB
BACTERIA SPEC CULT: NORMAL
GRAM STN SPEC: NORMAL
SERVICE CMNT-IMP: NORMAL

## 2024-01-10 NOTE — CARE COORDINATION
Care Transitions Outreach Attempt    Call within 2 business days of discharge: Yes   Attempted to reach patient for transitions of care follow up. Unable to reach patient.    Patient: Dariana Boo Patient : 1970 MRN: [id number]    Last Discharge Facility       Date Complaint Diagnosis Description Type Department Provider    24 Asthma Hypoxia ... ED to Hosp-Admission (Discharged) (ADMITTED) Dallas Olmstead MD          Pt was admitted to [facility] 24. Admission dates : 24 - 24  Diagnosis:   Hypoxia   Severe persistent asthma with exacerbation   Acute respiratory failure with hypoxia        Was this an external facility discharge? No Discharge Facility Name: [facility]    Noted following upcoming appointments from discharge chart review:   [facility] follow up appointment(s): No future appointments.  Non-[facility]  follow up appointment(s): TBD

## 2024-01-11 ENCOUNTER — CARE COORDINATION (OUTPATIENT)
Dept: OTHER | Facility: CLINIC | Age: 54
End: 2024-01-11

## 2024-01-11 RX ORDER — PREDNISONE 20 MG/1
20 TABLET ORAL DAILY
COMMUNITY

## 2024-01-11 NOTE — CARE COORDINATION
Care Transitions Outreach Attempt    Call within 2 business days of discharge: Yes   Attempted to reach patient for transitions of care follow up. Unable to reach patient.    Patient: Dariana Boo Patient : 1970 MRN: [id number]    Last Discharge Facility       Date Complaint Diagnosis Description Type Department Provider    24 Asthma Hypoxia ... ED to Hosp-Admission (Discharged) (ADMITTED) [id number] Sharp MD              Was this an external facility discharge? No Discharge Facility Name: [facility]    Ashia following upcoming appointments from discharge chart review:   [facility] follow up appointment(s): No future appointments.  Non-[facility]  follow up appointment(s): TBD      
appointment scheduling offered: No.   Is follow up appointment scheduled within 7 days of discharge? Yes.    Follow Up  No future appointments.    Care Transition Nurse provided contact information.  Plan for follow-up call in 5-7 days based on severity of symptoms and risk factors.  Plan for next call: symptom management-related to asthma  follow-up appointment-PCP and pulmonary    Follow-up Information       Follow up With Specialties Details Why Contact Info    Tony Hope MD Pulmonary Disease Follow up on 1/11/2024  [address]   [address]  [facility] [address] [phone]      Bela Perales MD Family Medicine Follow up on 1/12/2024  [address]  [facility] [address] [phone]             Goals        Attends follow-up appointments as directed.      PCP - 1/12/24  Pulmonary - attended appt today 1/11/24 f/u 1 week  PFT - 1/12/24  RTW - TBD       Knowledge and adherence of prescribed medication (ie. action, side effects, missed dose, etc.).      Taking prescribed meds including prednisone and ABX until completed        Supportive resources in place to maintain patient in the community (ie., home health, equipment, DME, refer to, etc.)      [facility] # [phone]  Nurse Access line 24/7 located on the back of the insurance card  [facility] # [phone]  [facility] # [phone]  [facility] # [phone] Monday - Sunday 8:00 AM - 8:00 PM  HR Service Now - [facility]  [place] - [phone]  [facility] Help - # 1- [phone]  HR Service Center at [phone] weekdays from 7 a.m. - 6 p.m. ET   [facility].[employer] or on the [employer] mobile rani to sign up. For questions or support, visit Stylect/support or call [phone](press 2 for 24/7 crisis support).   [facility] - [phone] Monday-Friday 8 a.m. to 11 p.m. EST and Saturday-Sunday 9 a.m. to 5 p.m. EST.

## 2024-01-12 LAB
EKG ATRIAL RATE: 125 BPM
EKG DIAGNOSIS: NORMAL
EKG P AXIS: 69 DEGREES
EKG P-R INTERVAL: 158 MS
EKG Q-T INTERVAL: 304 MS
EKG QRS DURATION: 72 MS
EKG QTC CALCULATION (BAZETT): 438 MS
EKG R AXIS: 86 DEGREES
EKG T AXIS: 65 DEGREES
EKG VENTRICULAR RATE: 125 BPM

## 2024-01-12 NOTE — PROGRESS NOTES
Patient: Dariana Boo         YOB: 1970        DOA: 1/7/2024  Discharge Date: 1/9/24     Time to return work :    [place] 15,2024          HILARY ROMERO MD.     
  Hospitalist Progress Note-critical care note     Patient: Dariana Boo MRN: [id number]  CSN: [id number]    YOB: 1970  Age: 53 y.o.  Sex: female    DOA: 1/7/2024 LOS:  LOS: 1 day            Chief complaint: acute asthma exacerbation,  anxiety hypoxia     Assessment/Plan         Active Hospital Problems    Diagnosis Date Noted    Acute asthma exacerbation [J45.901] 01/07/2024    Hypoxic respiratory failure (HCC) [J96.91] 01/07/2024    Hypoxemia [R09.02] 01/07/2024    GERD (gastroesophageal reflux disease) [K21.9] 10/27/2020    Left foot drop [M21.372] 01/25/2018    Myofascial pain [M79.18] 07/14/2017         Asthma exacerbation with hypoxia   On Brovana and Pulmicort nebulizers  IV Solu-Medrol-will weaning   Xopenex as needed due to tachycardia  Cta chest no acute process   Uds negative      Likely has bronchitis  on Rocephin and doxycycline  CTAs also ordered  Pulm called on admission      Tachycardia-  Echocardiogram done   Trend troponin-wnl   On Xopenex  Check tsh       Reflux  IV Protonix  GI cocktail as needed      Coronary artery disease 30%  Right RCA stenosis     Left foot drop post surgery fall precaution     Anxiety alcohol drinking   Klonopin prn   Ciwa protocol       Subjective I feel nervous all the time, will have an appointment with Dr. Cisneros , still feel can not breathing well          TIME: E/M Time spent with patient and patient care issues: [] 31-40 mins  [] 41-49 mins  [x] 50 mins or more.      Disposition :tbd,   Review of systems:    General: No fevers or chills.  Cardiovascular: No chest pain or pressure. No palpitations.   Pulmonary: No shortness of breath.   Gastrointestinal: No nausea, vomiting.     Vital signs/Intake and Output:  Visit Vitals  /73   Pulse (!) 103   Temp 97.6 °F (36.4 °C) (Oral)   Resp 16   Ht 1.6 m (5' 3\")   Wt 80.7 kg (178 lb)   SpO2 99%   BMI 31.53 kg/m²     Current Shift:  No intake/output data recorded.  Last three shifts:  No 
  Physician Progress Note      PATIENT:               LARISSA GROVER  [facility] #:                  [id number]  :                       1970  ADMIT DATE:       2024 2:18 PM  DISCH DATE:        2024 5:26 PM  RESPONDING  PROVIDER #:        Mendy Castillo MD          QUERY TEXT:    Patient admitted with asthma exacerbation with hypoxia. Noted documentation of   acute respiratory failure in the DS Admission Diagnoses. In order to support   the diagnosis of acute respiratory failure, please include additional clinical   indicators in your documentation.  Or please document if the diagnosis of   acute respiratory failure has been ruled out after further study.    The medical record reflects the following:  Risk Factors: Asthma  Clinical Indicators:  > Per HPI- Her wheezing shortness of breath on exertion worsen every time she   finishes her steroid she is scheduled to see her pulmonologist Dr. Hope   on Thursday.  > RR 36 on admission  > O2 sats 97%  24 17:15  POC pH: 7.42  POC pCO2: 35.4  POC PO2: 67 (L)  POC HCO3: 23.0  POC O2 SAT: 93.5  POC Richard's Test: Positive  FIO2: 21  BASE DEFICIT (POC): 1.1    Treatment: receiving    Acute Respiratory Failure Clinical Indicators per 3M MS-DRG Training Guide and   Quick Reference Guide:  pO2 < 60 mmHg or SpO2 (pulse oximetry) < 91% breathing room air  pCO2 > 50 and pH < 7.35  P/F ratio (pO2 / FIO2) < 300  pO2 decrease or pCO2 increase by 10 mmHg from baseline (if known)  Supplemental oxygen of 40% or more  Presence of respiratory distress, tachypnea, dyspnea, shortness of breath,   wheezing  Unable to speak in complete sentences  Use of accessory muscles to breathe  Extreme anxiety and feeling of impending doom  Tripod position  Confusion/altered mental status/obtunded    Gayla Gibson, RN, BSN, CRCR  [address] [place] [address]  [email or web address]  Options provided:  -- Acute Respiratory Failure as evidenced by (please document evidence), 
2300 This writer received Handoff in regards to:  Dariana Boo (53 y.o., female)    : 1970    Admitted: 2024     Report on Dariana Boo ,(53 y.o., female), was received from Offgoing nurse, GunnarRN    All questions and concerns of this writer, Patient, and/or Family were addressed at this time. Report on patient's status and plan of care was given.        Patient is in bed Hob raised. No complaints or concerns voiced at this time    Urine sample turned in. Patient provided with specimen cup for respiratory culture.   ____________________________    This writer gave Handoff in regards to:  Dariana Boo (53 y.o., female)    : 1970    Admitted: 2024     This Patient will be received by the oncoming Nurse; Rickey    All questions and concerns of the Nurse, Patient, and/or Family were addressed at this time. Report on patient's status and plan of care was given.   
RT Evaluation for Home Oxygen    Resting (Room Air):  SpO2:  100  HR:  107    During Walk (Room Air):  SpO2:  99  HR:  125  Walk/Assistance Device:  N/A      After Walk:  SpO2:  100  HR:  120  Does the Patient Qualify for Home O2:  No  Does the Patient Need Portable Oxygen Tanks:  No    Electronically signed by Venecia Zeng RN on 1/9/2024 at 2:46 PM      
Talked with pt again, she agrees to go home .   
resulted in some phonetic-based errors in grammar and contents. Even though attempts were made to correct all the mistakes, some may have been missed, and remained in the body of the document.    Darshan Chavarria MD  1/9/2024

## 2024-01-23 ENCOUNTER — CARE COORDINATION (OUTPATIENT)
Dept: OTHER | Facility: CLINIC | Age: 54
End: 2024-01-23

## 2024-01-23 NOTE — CARE COORDINATION
Care Transitions Follow Up Call    ACM attempted to reach patient for Care Transitions follow up call. HIPAA compliant message left requesting a return phone call at patient convenience.     Pt was admitted to [facility] 1/7/24. Admission dates : 1/7/24 - 1/9/24  Diagnosis:   Hypoxia   Severe persistent asthma with exacerbation   Acute respiratory failure with hypoxia       Plan for follow-up call in 7-10 days    No future appointments.      Goals        Attends follow-up appointments as directed.      PCP - 1/12/24  Pulmonary - attended appt today 1/11/24 f/u 1 week  PFT - 1/12/24  RTW - TBD    1/23/24 Call placed to patient, no answer. Voicemail left to return call.          Knowledge and adherence of prescribed medication (ie. action, side effects, missed dose, etc.).      Taking prescribed meds including prednisone and ABX until completed        Supportive resources in place to maintain patient in the community (ie., home health, equipment, DME, refer to, etc.)      [facility] - # [phone]  Nurse Access line 24/7 located on the back of the insurance [facility] # [phone]  [facility] # [phone]  [facility] # [phone] Monday - Sunday 8:00 AM - 8:00 PM  HR Service Now - [facility]  IT - [phone]  MyChart Help - # 1- [phone]  HR Service Center at [phone] weekdays from 7 a.m. - 6 p.m. ET   [facility].[employer] or on the Display[employer] mobile rani to sign up. For questions or support, visit Teliris/support or call [phone](press 2 for 24/7 crisis support).   [facility] - [phone] Monday-Friday 8 a.m. to 11 p.m. EST and Saturday-Sunday 9 a.m. to 5 p.m. EST.         Understand ASTHMA action plan. (ie. when to seek medical care, understanding green, yellow, and red zones, how and when to adjust asthma treatment)                          Jo Ann Garber RN, AAS

## 2024-01-25 ENCOUNTER — HOSPITAL ENCOUNTER (OUTPATIENT)
Facility: HOSPITAL | Age: 54
Discharge: HOME | End: 2024-01-28

## 2024-01-25 LAB — LABCORP SPECIMEN COLLECTION: NORMAL

## 2024-02-13 ENCOUNTER — CARE COORDINATION (OUTPATIENT)
Dept: OTHER | Facility: CLINIC | Age: 54
End: 2024-02-13

## 2024-02-13 NOTE — CARE COORDINATION
Call placed to patient, no answer. Voicemail left to return call.     Resolving current episode RICARDO (Transitions of care complete).  No further ED/UC or hospital admissions within 30 days post discharge. Patient attended follow-up appointments as directed.  No outreach from patient to ACMT. Lost to follow-up.

## 2024-03-24 ENCOUNTER — HOSPITAL ENCOUNTER (EMERGENCY)
Facility: HOSPITAL | Age: 54
Discharge: HOME | End: 2024-03-24
Attending: STUDENT IN AN ORGANIZED HEALTH CARE EDUCATION/TRAINING PROGRAM
Payer: COMMERCIAL

## 2024-03-24 ENCOUNTER — APPOINTMENT (OUTPATIENT)
Facility: HOSPITAL | Age: 54
End: 2024-03-24
Payer: COMMERCIAL

## 2024-03-24 VITALS
RESPIRATION RATE: 19 BRPM | BODY MASS INDEX: 31.89 KG/M2 | SYSTOLIC BLOOD PRESSURE: 133 MMHG | DIASTOLIC BLOOD PRESSURE: 77 MMHG | OXYGEN SATURATION: 94 % | HEIGHT: 63 IN | WEIGHT: 180 LBS | HEART RATE: 109 BPM | TEMPERATURE: 97.2 F

## 2024-03-24 DIAGNOSIS — J45.20 MILD INTERMITTENT ASTHMA, UNSPECIFIED WHETHER COMPLICATED: Primary | ICD-10-CM

## 2024-03-24 LAB
ALBUMIN SERPL-MCNC: 4.1 G/DL (ref 3.4–5)
ALBUMIN/GLOB SERPL: 1.5 (ref 0.8–1.7)
ALP SERPL-CCNC: 79 U/L (ref 45–117)
ALT SERPL-CCNC: 46 U/L (ref 13–56)
ANION GAP SERPL CALC-SCNC: 6 MMOL/L (ref 3–18)
AST SERPL-CCNC: 30 U/L (ref 10–38)
BASE EXCESS BLD CALC-SCNC: 3.7 MMOL/L
BASOPHILS # BLD: 0.1 K/UL (ref 0–0.1)
BASOPHILS NFR BLD: 1 % (ref 0–2)
BILIRUB SERPL-MCNC: 0.4 MG/DL (ref 0.2–1)
BUN SERPL-MCNC: 15 MG/DL (ref 7–18)
BUN/CREAT SERPL: 15 (ref 12–20)
CALCIUM SERPL-MCNC: 9.2 MG/DL (ref 8.5–10.1)
CHLORIDE SERPL-SCNC: 108 MMOL/L (ref 100–111)
CO2 SERPL-SCNC: 31 MMOL/L (ref 21–32)
CREAT SERPL-MCNC: 1.01 MG/DL (ref 0.6–1.3)
D DIMER PPP FEU-MCNC: 0.3 UG/ML(FEU)
DIFFERENTIAL METHOD BLD: ABNORMAL
EOSINOPHIL # BLD: 0.3 K/UL (ref 0–0.4)
EOSINOPHIL NFR BLD: 4 % (ref 0–5)
ERYTHROCYTE [DISTWIDTH] IN BLOOD BY AUTOMATED COUNT: 12.9 % (ref 11.6–14.5)
GLOBULIN SER CALC-MCNC: 2.8 G/DL (ref 2–4)
GLUCOSE SERPL-MCNC: 79 MG/DL (ref 74–99)
HCO3 BLD-SCNC: 27.3 MMOL/L (ref 22–26)
HCT VFR BLD AUTO: 36 % (ref 35–45)
HGB BLD-MCNC: 12 G/DL (ref 12–16)
IMM GRANULOCYTES # BLD AUTO: 0 K/UL (ref 0–0.04)
IMM GRANULOCYTES NFR BLD AUTO: 1 % (ref 0–0.5)
LACTATE BLD-SCNC: 0.98 MMOL/L (ref 0.4–2)
LYMPHOCYTES # BLD: 2.4 K/UL (ref 0.9–3.6)
LYMPHOCYTES NFR BLD: 29 % (ref 21–52)
MAGNESIUM SERPL-MCNC: 2 MG/DL (ref 1.6–2.6)
MCH RBC QN AUTO: 30.2 PG (ref 24–34)
MCHC RBC AUTO-ENTMCNC: 33.3 G/DL (ref 31–37)
MCV RBC AUTO: 90.5 FL (ref 78–100)
MONOCYTES # BLD: 0.6 K/UL (ref 0.05–1.2)
MONOCYTES NFR BLD: 7 % (ref 3–10)
NEUTS SEG # BLD: 5 K/UL (ref 1.8–8)
NEUTS SEG NFR BLD: 59 % (ref 40–73)
NRBC # BLD: 0 K/UL (ref 0–0.01)
NRBC BLD-RTO: 0 PER 100 WBC
NT PRO BNP: 67 PG/ML (ref 0–900)
PCO2 BLD: 36.6 MMHG (ref 35–45)
PH BLD: 7.48 (ref 7.35–7.45)
PLATELET # BLD AUTO: 255 K/UL (ref 135–420)
PMV BLD AUTO: 8.9 FL (ref 9.2–11.8)
PO2 BLD: 29 MMHG (ref 80–100)
POTASSIUM SERPL-SCNC: 3.8 MMOL/L (ref 3.5–5.5)
PROT SERPL-MCNC: 6.9 G/DL (ref 6.4–8.2)
RBC # BLD AUTO: 3.98 M/UL (ref 4.2–5.3)
SAO2 % BLD: 59.5 % (ref 92–97)
SERVICE CMNT-IMP: ABNORMAL
SODIUM SERPL-SCNC: 145 MMOL/L (ref 136–145)
SPECIMEN TYPE: ABNORMAL
TROPONIN I SERPL HS-MCNC: 5 NG/L (ref 0–54)
TROPONIN I SERPL HS-MCNC: 5 NG/L (ref 0–54)
WBC # BLD AUTO: 8.5 K/UL (ref 4.6–13.2)

## 2024-03-24 PROCEDURE — 83735 ASSAY OF MAGNESIUM: CPT

## 2024-03-24 PROCEDURE — 96365 THER/PROPH/DIAG IV INF INIT: CPT

## 2024-03-24 PROCEDURE — 6360000002 HC RX W HCPCS: Performed by: STUDENT IN AN ORGANIZED HEALTH CARE EDUCATION/TRAINING PROGRAM

## 2024-03-24 PROCEDURE — 99285 EMERGENCY DEPT VISIT HI MDM: CPT

## 2024-03-24 PROCEDURE — 80053 COMPREHEN METABOLIC PANEL: CPT

## 2024-03-24 PROCEDURE — 83605 ASSAY OF LACTIC ACID: CPT

## 2024-03-24 PROCEDURE — 83880 ASSAY OF NATRIURETIC PEPTIDE: CPT

## 2024-03-24 PROCEDURE — 96375 TX/PRO/DX INJ NEW DRUG ADDON: CPT

## 2024-03-24 PROCEDURE — 85025 COMPLETE CBC W/AUTO DIFF WBC: CPT

## 2024-03-24 PROCEDURE — 85379 FIBRIN DEGRADATION QUANT: CPT

## 2024-03-24 PROCEDURE — 84484 ASSAY OF TROPONIN QUANT: CPT

## 2024-03-24 PROCEDURE — 71045 X-RAY EXAM CHEST 1 VIEW: CPT

## 2024-03-24 PROCEDURE — 2580000003 HC RX 258: Performed by: STUDENT IN AN ORGANIZED HEALTH CARE EDUCATION/TRAINING PROGRAM

## 2024-03-24 PROCEDURE — 82803 BLOOD GASES ANY COMBINATION: CPT

## 2024-03-24 RX ORDER — ALBUTEROL SULFATE 2.5 MG/3ML
2.5 SOLUTION RESPIRATORY (INHALATION)
Status: COMPLETED | OUTPATIENT
Start: 2024-03-24 | End: 2024-03-24

## 2024-03-24 RX ORDER — MAGNESIUM SULFATE 1 G/100ML
1000 INJECTION INTRAVENOUS
Status: COMPLETED | OUTPATIENT
Start: 2024-03-24 | End: 2024-03-24

## 2024-03-24 RX ORDER — PREDNISONE 20 MG/1
TABLET ORAL
Qty: 9 TABLET | Refills: 0 | Status: SHIPPED | OUTPATIENT
Start: 2024-03-24 | End: 2024-03-29

## 2024-03-24 RX ADMIN — METHYLPREDNISOLONE SODIUM SUCCINATE 125 MG: 125 INJECTION INTRAMUSCULAR; INTRAVENOUS at 20:10

## 2024-03-24 RX ADMIN — MAGNESIUM SULFATE HEPTAHYDRATE 1000 MG: 1 INJECTION, SOLUTION INTRAVENOUS at 20:11

## 2024-03-24 RX ADMIN — ALBUTEROL SULFATE 2.5 MG: 2.5 SOLUTION RESPIRATORY (INHALATION) at 20:10

## 2024-03-24 RX ADMIN — ALBUTEROL SULFATE 2.5 MG: 2.5 SOLUTION RESPIRATORY (INHALATION) at 20:18

## 2024-03-24 RX ADMIN — ALBUTEROL SULFATE 2.5 MG: 2.5 SOLUTION RESPIRATORY (INHALATION) at 20:34

## 2024-03-24 ASSESSMENT — PAIN - FUNCTIONAL ASSESSMENT
PAIN_FUNCTIONAL_ASSESSMENT: NONE - DENIES PAIN
PAIN_FUNCTIONAL_ASSESSMENT: NONE - DENIES PAIN

## 2024-03-24 NOTE — ED TRIAGE NOTES
Asthma attack, started wheezing yesterday. Tried treatments at home without success. Pt with audible wheezing noted in triage. Was seen in January for similar.   Last albuterol about 45 mins

## 2024-03-25 ENCOUNTER — CARE COORDINATION (OUTPATIENT)
Dept: OTHER | Facility: CLINIC | Age: 54
End: 2024-03-25

## 2024-03-25 NOTE — CARE COORDINATION
Ambulatory Care Coordination Note    ACM attempted to reach patient for introduction to Associate Care Management related to [facility] ER 3/24/24. HIPAA compliant message left requesting a return phone call at patient convenience.     Plan for follow-up call in 1-2 days      No future appointments.

## 2024-03-25 NOTE — ED PROVIDER NOTES
following recommendations:  Patient given strict ED return precautions for any new or worsening symptoms. Patient is stable for discharge and agreeable with plan.      Problems Addressed:  Mild intermittent asthma, unspecified whether complicated: acute illness or injury    Amount and/or Complexity of Data Reviewed  Labs: ordered.  Radiology: ordered and independent interpretation performed. Decision-making details documented in ED Course.  ECG/medicine tests: ordered and independent interpretation performed. Decision-making details documented in ED Course.    Risk  Prescription drug management.         ED Course and Updates  ED Course as of 03/25/24 0247   Sun Mar 24, 2024   2039  EKG interpreted by me: 92 BPM.  NSR, Normal Axis, Normal Intervals, No STEMI   [CM]   2051 XR Chest  MY ED INTERPRETATION OF CHEST XRAY - NORMAL  NO CARDIOMEGALY, NO EFFUSIONS, TRACHEA MIDLINE, NO ACUTE FRACTURES, R HEMIDIAPHRAGM ELEVATION  Preliminary ED interpretation performed by ED Physician [CM]      ED Course User Index  [CM] Bradley Mancia MD       CONSULTS:   None      Disposition and Diagnosis  DISPOSITION:  Decision to Discharge [1]    DIAGNOSIS:  1. Mild intermittent asthma, unspecified whether complicated         MEDICATIONS:  Discharge Medication List as of 3/24/2024 10:36 PM        START taking these medications    Details   !! predniSONE (DELTASONE) 20 MG tablet Take 3 tablets by mouth daily for 1 day, THEN 2 tablets daily for 2 days, THEN 1 tablet daily for 2 days., Disp-9 tablet, R-0Normal       !! - Potential duplicate medications found. Please discuss with provider.         Discharge Medication List as of 3/24/2024 10:36 PM         Discharge Medication List as of 3/24/2024 10:36 PM         Discharge Medication List as of 3/24/2024 10:36 PM        CONTINUE these medications which have NOT CHANGED    Details   !! predniSONE (DELTASONE) 20 MG tablet Take 1 tablet by mouth daily Take 3 tabs daily for 3 days decrease

## 2024-03-26 ENCOUNTER — CARE COORDINATION (OUTPATIENT)
Dept: OTHER | Facility: CLINIC | Age: 54
End: 2024-03-26

## 2024-03-26 NOTE — CARE COORDINATION
Ambulatory Care Coordination Note    ACM attempted 2nd outreach to patient for introduction to Associate Care Management related to [facility] ER 3/24/24. Diagnosis: Mild intermittent asthma, unspecified whether complicated. HIPAA compliant message left requesting a return phone call at patient convenience.     Unable to Reach Letter sent to patient via [employer].    Will continue to outreach.      No future appointments.

## 2024-03-28 LAB
EKG ATRIAL RATE: 92 BPM
EKG DIAGNOSIS: NORMAL
EKG P AXIS: 43 DEGREES
EKG P-R INTERVAL: 150 MS
EKG Q-T INTERVAL: 366 MS
EKG QRS DURATION: 86 MS
EKG QTC CALCULATION (BAZETT): 452 MS
EKG R AXIS: 77 DEGREES
EKG T AXIS: 63 DEGREES
EKG VENTRICULAR RATE: 92 BPM

## 2024-04-02 ENCOUNTER — CARE COORDINATION (OUTPATIENT)
Dept: OTHER | Facility: CLINIC | Age: 54
End: 2024-04-02

## 2024-04-03 ENCOUNTER — ENROLLMENT (OUTPATIENT)
Facility: HOSPITAL | Age: 54
End: 2024-04-03

## 2024-04-03 ENCOUNTER — TELEPHONE (OUTPATIENT)
Facility: HOSPITAL | Age: 54
End: 2024-04-03

## 2024-04-03 RX ORDER — BUDESONIDE, GLYCOPYRROLATE, AND FORMOTEROL FUMARATE 160; 9; 4.8 UG/1; UG/1; UG/1
AEROSOL, METERED RESPIRATORY (INHALATION)
COMMUNITY
Start: 2024-04-02

## 2024-04-03 RX ORDER — ALBUTEROL SULFATE 2.5 MG/3ML
SOLUTION RESPIRATORY (INHALATION)
COMMUNITY
Start: 2024-01-09 | End: 2024-04-04

## 2024-04-03 RX ORDER — PIMECROLIMUS 10 MG/G
CREAM TOPICAL
COMMUNITY
Start: 2023-10-24 | End: 2024-04-04

## 2024-04-03 RX ORDER — MEPOLIZUMAB 100 MG/ML
INJECTION, SOLUTION SUBCUTANEOUS
COMMUNITY
Start: 2024-03-18 | End: 2024-04-05

## 2024-04-03 RX ORDER — CEFDINIR 300 MG/1
300 CAPSULE ORAL 2 TIMES DAILY
COMMUNITY
Start: 2024-04-02

## 2024-04-03 RX ORDER — CARVEDILOL 3.12 MG/1
3.12 TABLET ORAL 2 TIMES DAILY WITH MEALS
COMMUNITY
Start: 2024-02-27

## 2024-04-03 RX ORDER — VALACYCLOVIR HYDROCHLORIDE 1 G/1
2000 TABLET, FILM COATED ORAL 2 TIMES DAILY
COMMUNITY
Start: 2023-12-15

## 2024-04-03 RX ORDER — DICLOFENAC SODIUM 75 MG/1
75 TABLET, DELAYED RELEASE ORAL 2 TIMES DAILY
COMMUNITY
End: 2024-04-04 | Stop reason: SINTOL

## 2024-04-03 RX ORDER — FEZOLINETANT 45 MG/1
1 TABLET, FILM COATED ORAL DAILY
COMMUNITY
Start: 2024-02-07

## 2024-04-03 RX ORDER — VITAMIN B COMPLEX
1 CAPSULE ORAL DAILY
COMMUNITY
End: 2024-04-04

## 2024-04-03 RX ORDER — MULTIVIT-MIN/IRON/FOLIC ACID/K 18-600-40
CAPSULE ORAL
COMMUNITY
Start: 2016-08-04

## 2024-04-03 RX ORDER — ROSUVASTATIN CALCIUM 10 MG/1
10 TABLET, COATED ORAL DAILY
COMMUNITY
Start: 2024-01-31

## 2024-04-03 NOTE — CARE COORDINATION
Verified  and address for HIPAA security.  Introduced ACMT services for patient.   Patient does not identify any Care Management needs at this time and declines services.      Contact info provided for future reference.

## 2024-04-03 NOTE — PROGRESS NOTES
any kind? Yes, treating with cefdinir  Contraindications to therapy present? No    Drug Interactions:  The following clinically significant interactions were identified via Family Pet Interaction Analysis as category D or higher:  .  Albuterol, Breztri + carvediolol: decreased bronchodilatory effect, monitor for reduced effectiveness: Patient states has not had any known issues, but does currently have issues with her asthma.    Valacyclovir + Veozah: weak CY inhibitors (valacyclovir) may increase serum concentration of Veozah. Package insert for Veozah states use is contraindicated. Patient states using valacylovir once per year. Was not aware of the interaction.   Diltiazem + alprazolam: CY inhibitors (diltiazem) can increase serum concentration of alprazolam. Monitor for increased CNS depression. Patient reports no issues with CNS depression.    Cefdinir + magnesium: administer cefdinir 2 hours before or 2 hours after magnesium. Educated patient.   Mucinex-D + venlafaxine: SNRI may increase tacchycardia of zyrtec-D and increase vasopressor effects: Avoid if possible, or monitor for increased blood pressure, chest pain, headaches. Patient is aware of this interaction and states she has experienced prior. However, she was using a lot of interacting agents at the same time and now will only using mucinex-D as needed and is aware of what to look out for.   _____________________________________________________________________  Renal Dosing:  Creatinine Clearance: C-G Adjusted Body weight: 64.5 ml/min  No renal adjustments necessary.    LABS  Lab Results   Component Value Date/Time    BUN 15 2024 07:50 PM    CREATININE 1.01 2024 07:50 PM     Lab Results   Component Value Date/Time    WBC 8.5 2024 07:50 PM    HGB 12.0 2024 07:50 PM    HCT 36.0 2024 07:50 PM    RBC 3.98 2024 07:50 PM     2024 07:50 PM     Lab Results   Component Value Date/Time    ALKPHOS 79

## 2024-04-03 NOTE — TELEPHONE ENCOUNTER
Specialty Medication Service    Date: 4/3/2024  Patient's Name: Dariana Boo YOB: 1970            _____________________________________________________________________________________________    Patient returned call to schedule PharmD initial appointment for Specialty Medication Services. Patient scheduled 04/04/24  Most recent office notes from Dr. Matamoros 03/28/24 in patient chart.    Margareth Rodriguez CPhT  Clinical   Specialty Medication Services  Phone: [phone] option #4  Fax: [phone]      For Pharmacy Admin Tracking Only    Program: SMS  CPA in place:  No  Recommendation Provided To: Patient/Caregiver: 1 via Telephone  Intervention Detail: Scheduled Appointment  Intervention Accepted By: Patient/Caregiver: 1  Gap Closed?:    Time Spent (min): 15     dizziness

## 2024-04-04 ENCOUNTER — TELEPHONE (OUTPATIENT)
Facility: HOSPITAL | Age: 54
End: 2024-04-04

## 2024-04-04 ENCOUNTER — ENROLLMENT (OUTPATIENT)
Facility: HOSPITAL | Age: 54
End: 2024-04-04

## 2024-04-04 ENCOUNTER — PHARMACY VISIT (OUTPATIENT)
Facility: HOSPITAL | Age: 54
End: 2024-04-04

## 2024-04-04 DIAGNOSIS — J82.83 EOSINOPHILIC ASTHMA: Primary | ICD-10-CM

## 2024-04-04 RX ORDER — CETIRIZINE HYDROCHLORIDE 10 MG/1
10 TABLET ORAL 2 TIMES DAILY
COMMUNITY

## 2024-04-04 ASSESSMENT — PROMIS GLOBAL HEALTH SCALE
IN GENERAL, HOW WOULD YOU RATE YOUR MENTAL HEALTH, INCLUDING YOUR MOOD AND YOUR ABILITY TO THINK [ON A SCALE OF 1 (POOR) TO 5 (EXCELLENT)]?: GOOD
SUM OF RESPONSES TO QUESTIONS 3, 6, 7, & 8: 10
TO WHAT EXTENT ARE YOU ABLE TO CARRY OUT YOUR EVERYDAY PHYSICAL ACTIVITIES SUCH AS WALKING, CLIMBING STAIRS, CARRYING GROCERIES, OR MOVING A CHAIR [ON A SCALE OF 1 (NOT AT ALL) TO 5 (COMPLETELY)]?: COMPLETELY
IN THE PAST 7 DAYS, HOW WOULD YOU RATE YOUR PAIN ON AVERAGE [ON A SCALE FROM 0 (NO PAIN) TO 10 (WORST IMAGINABLE PAIN)]?: 0 NO PAIN
IN GENERAL, PLEASE RATE HOW WELL YOU CARRY OUT YOUR USUAL SOCIAL ACTIVITIES (INCLUDES ACTIVITIES AT HOME, AT WORK, AND IN YOUR COMMUNITY, AND RESPONSIBILITIES AS A PARENT, CHILD, SPOUSE, EMPLOYEE, FRIEND, ETC) [ON A SCALE OF 1 (POOR) TO 5 (EXCELLENT)]?: VERY GOOD
IN GENERAL, HOW WOULD YOU RATE YOUR SATISFACTION WITH YOUR SOCIAL ACTIVITIES AND RELATIONSHIPS [ON A SCALE OF 1 (POOR) TO 5 (EXCELLENT)]?: VERY GOOD
IN GENERAL, WOULD YOU SAY YOUR QUALITY OF LIFE IS...[ON A SCALE OF 1 (POOR) TO 5 (EXCELLENT)]: FAIR
SUM OF RESPONSES TO QUESTIONS 2, 4, 5, & 10: 14
IN THE PAST 7 DAYS, HOW OFTEN HAVE YOU BEEN BOTHERED BY EMOTIONAL PROBLEMS, SUCH AS FEELING ANXIOUS, DEPRESSED, OR IRRITABLE [ON A SCALE FROM 1 (NEVER) TO 5 (ALWAYS)]?: NEVER
IN THE PAST 7 DAYS, HOW WOULD YOU RATE YOUR FATIGUE ON AVERAGE [ON A SCALE FROM 1 (NONE) TO 5 (VERY SEVERE)]?: MODERATE
IN GENERAL, WOULD YOU SAY YOUR HEALTH IS...[ON A SCALE OF 1 (POOR) TO 5 (EXCELLENT)]: FAIR
IN GENERAL, HOW WOULD YOU RATE YOUR PHYSICAL HEALTH [ON A SCALE OF 1 (POOR) TO 5 (EXCELLENT)]?: FAIR

## 2024-04-04 ASSESSMENT — PATIENT HEALTH QUESTIONNAIRE - PHQ9
SUM OF ALL RESPONSES TO PHQ QUESTIONS 1-9: 0
SUM OF ALL RESPONSES TO PHQ QUESTIONS 1-9: 0
1. LITTLE INTEREST OR PLEASURE IN DOING THINGS: NOT AT ALL
2. FEELING DOWN, DEPRESSED OR HOPELESS: NOT AT ALL
SUM OF ALL RESPONSES TO PHQ9 QUESTIONS 1 & 2: 0
SUM OF ALL RESPONSES TO PHQ QUESTIONS 1-9: 0
SUM OF ALL RESPONSES TO PHQ QUESTIONS 1-9: 0

## 2024-04-04 NOTE — TELEPHONE ENCOUNTER
Specialty Medication Service    Date: 4/4/2024  Patient's Name: Dariana Boo YOB: 1970            _____________________________________________________________________________________________    Left message for office to switch Nucala from vial to autoinjector. Patient will be doing home injections and auto-injector is patient preference. Per pharmacy benefits the PA will not need to be updated. Once change has been made will call patient and transfer to [facility] to complete shipment. Will follow-up.

## 2024-04-04 NOTE — TELEPHONE ENCOUNTER
Specialty Medication Service    Date: 4/4/2024  Patient's Name: Dariana Boo YOB: 1970            _____________________________________________________________________________________________    Faxed specialist referral request per SMS compliance.     Reynaldo Sandoval, PharmD [facility]  Ambulatory Clinical Pharmacist  Specialty Medication Services  Phone: [phone] option #4  Fax: [phone]    For Pharmacy Admin Tracking Only    Program: SMS  CPA in place:  No  Recommendation Provided To: Provider: 1 via Fax sent to office    Intervention Accepted By: Provider: 0    Time Spent (min): 5

## 2024-04-05 RX ORDER — MEPOLIZUMAB 100 MG/ML
INJECTION, SOLUTION SUBCUTANEOUS
COMMUNITY
End: 2024-04-05 | Stop reason: SDUPTHER

## 2024-04-08 ENCOUNTER — PHARMACY VISIT (OUTPATIENT)
Facility: HOSPITAL | Age: 54
End: 2024-04-08

## 2024-04-08 DIAGNOSIS — J82.83 EOSINOPHILIC ASTHMA: Primary | ICD-10-CM

## 2024-04-08 RX ORDER — MEPOLIZUMAB 100 MG/ML
INJECTION, SOLUTION SUBCUTANEOUS
Qty: 1 ML | Refills: 11 | Status: SHIPPED | OUTPATIENT
Start: 2024-04-08

## 2024-04-08 NOTE — TELEPHONE ENCOUNTER
Specialty Medication Service    Date: 4/8/2024  Patient's Name: Dariana Boo YOB: 1970            _____________________________________________________________________________________________    Provider responded to request to change Nucala from Vial to Auto-injector. Patient is now setup for first shipment. No further action needed at this time.     Reynaldo Sandoval, PharmD [facility]  Ambulatory Clinical Pharmacist  Specialty Medication Services  Phone: [phone] option #4  Fax: [phone]  For Pharmacy Admin Tracking Only    Program: SMS  CPA in place:  No  Recommendation Provided To: Provider: 1 via Called provider office  Intervention Detail: Dose Adjustment: 1, reason: Patient Preference  Intervention Accepted By: Provider: 1  Time Spent (min): 20

## 2024-04-08 NOTE — PROGRESS NOTES
[facility]  Initial Specialty Medication Virtual Visit  [facility] SPECIALTY [facility]ICATION [facility]  [address] [facility] [address]  Dept: [phone]  Loc: [phone]  Date of patient's visit: 4/8/2024  Patient's Name:  Dariana Boo YOB: 1970            Patient Care Team:  Bela Perales MD as PCP - General  Rodrigo Trinidad MD as Surgeon  Tony Hope MD (Pulmonary Disease)  ================================================================    This visit will not be billed      CC: [employer] Interactive program    HPI  Dariana Boo is 53 y.o. is here for initial virtual visit for specialty medication.      Severe asthma  Patient has chronic severe persistent asthma diagnosed several years ago. Patient has failed trial of multiple medications including Trelegy. Pt recently started on Nucala which she took first dose and tolerated well. Yet to know whether this will provide long term relief. Also takes Breztri, albuterol and singulair.   Follows with pulm.    Denies any GI symptoms, URI symptoms, fatigue, new skin rash, muscle ache/spasms or UTI symptoms.    No other concerns today    Review of Pertinent info from Pharmacist as below:  Specialty Medication: Nucala 100 mg/ml syringe   Frequency: every 30 days  Indication: J45.50 - Severe Peristent Asthma, Uncomplicated  Initially Diagnosed: A few years ago  Additional Therapy:   Breztri 2 puffs BID  Montelukast 10mg bedtime  Zyrtec 10mg BID  Albuterol as needed  Mucinex as needed  Flonase as needed  Previous Therapy:   Trelegy (vision impairment)     Specialist:   Tony Hope MD  [facility] [place]  [address] [facility] , [address] [address]  P: [phone]; F: [phone]     Specialist Progress Note Available: Yes, scanned in Media tab  Last Specialist Visit: 03/18/2024:  Asthma follow-up, status post admission for asthma exacerbation - January 2024.

## 2024-04-24 ENCOUNTER — HOSPITAL ENCOUNTER (OUTPATIENT)
Facility: HOSPITAL | Age: 54
Discharge: HOME | End: 2024-04-27

## 2024-04-24 LAB
LABCORP SPECIMEN COLLECTION: NORMAL
LABCORP SPECIMEN COLLECTION: NORMAL

## 2024-04-24 PROCEDURE — 99001 SPECIMEN HANDLING PT-LAB: CPT

## 2024-04-30 ENCOUNTER — TRANSCRIBE ORDERS (OUTPATIENT)
Facility: HOSPITAL | Age: 54
End: 2024-04-30

## 2024-04-30 ENCOUNTER — HOSPITAL ENCOUNTER (OUTPATIENT)
Facility: HOSPITAL | Age: 54
Discharge: HOME | End: 2024-05-03

## 2024-04-30 DIAGNOSIS — E78.2 MIXED HYPERLIPIDEMIA: Primary | ICD-10-CM

## 2024-04-30 LAB — LABCORP SPECIMEN COLLECTION: NORMAL

## 2024-04-30 PROCEDURE — 99001 SPECIMEN HANDLING PT-LAB: CPT

## 2024-06-26 ENCOUNTER — TELEPHONE (OUTPATIENT)
Facility: HOSPITAL | Age: 54
End: 2024-06-26

## 2024-06-26 NOTE — TELEPHONE ENCOUNTER
Specialty Medication Service    Date: 2024  Patient's Name: Dariana Boo YOB: 1970            _____________________________________________________________________________________________    Provided office Cover My Meds Key: [id number] for patient medication Nucala. PA is due to  2024. Did not have follow-up notes on file from provider to complete PA and patient has not been seen by SMS again since starting medication. Therefore, not enough info on file to complete continuation PA. Will follow-up accordingly and update pharmacy/patient once approved/denied.      Reynaldo Sandoval, PharmD [facility]  Ambulatory Clinical Pharmacist  Specialty Medication Services  Phone: [phone] option #4  Fax: [phone]    For Pharmacy Admin Tracking Only    Program: SMS  CPA in place:  Yes  Recommendation Provided To: Provider: 1 via Fax sent to office  Intervention Detail: Benefit Assistance  Intervention Accepted By: Provider: 0    Time Spent (min): 30

## 2024-09-04 ENCOUNTER — TELEPHONE (OUTPATIENT)
Facility: HOSPITAL | Age: 54
End: 2024-09-04

## 2024-09-04 NOTE — TELEPHONE ENCOUNTER
Specialty Medication Service    Date: 9/4/2024  Patient's Name: Dariana Boo YOB: 1970            _____________________________________________________________________________________________    Patient transferred from [facility] to schedule PharmD follow up appointment for Specialty Medication Services. Patient scheduled 10/03/24 at 5 pm.      Gloria Connolly CPhT  Pharmacy   Specialty Medication Services   Phone: [phone] option 4    For Pharmacy Admin Tracking Only    Program: [facility]  CPA in place:  Yes  Recommendation Provided To: Patient/Caregiver: 1 via Telephone  Intervention Detail: Scheduled Appointment  Intervention Accepted By: Patient/Caregiver: 1  Gap Closed?:    Time Spent (min): 15

## 2024-09-27 ENCOUNTER — TELEPHONE (OUTPATIENT)
Facility: HOSPITAL | Age: 54
End: 2024-09-27

## 2024-10-02 NOTE — PROGRESS NOTES
Therefore, patients with preexisting helminth infections should undergo treatment of the infection prior to initiation of mepolizumab therapy. Patients who become infected during mepolizumab treatment and do not respond to anti-helminth therapy should discontinue mepolizumab until the infection resolves. Corticosteroids: Do not discontinue systemic or inhaled corticosteroids abruptly upon initiation of mepolizumab. Reductions in corticosteroid dose should be gradual, if appropriate. Clinicians should note that a reduction in corticosteroid dose may be associated with withdrawal symptoms and/or unmask conditions previously suppressed by systemic corticosteroid therapy.  Recommended monitoring: FEV1, peak flow, and/or other pulmonary function tests as discretion of provider. Monitor for increased use of short-acting beta2-agonist inhalers (may be a marker of a deteriorating asthma condition)  Storage: Store refrigerated at 2°C to 8°C (36°F to 46°F) in the original outer carton to protect from light. Do not freeze. Do not shake.  Do not expose to extreme heat or direct sunlight.    Handling: May be stored out of refrigerator in the original carton at room temperature for up to 7 days. Once autoinjector or syringe is removed from carton, administer within 8 hours, otherwise discard. After storing out of the refrigerator, do NOT place back in the refrigerator. Do not store above 86°F. Do not use beyond printed expiration date on label.   Patient Reported Side Effects: denies   Hypersensitivity reactions: denies   Injection site reaction: denies   Headache: denies   Skin rash: denies   Fatigue: denies   Infections: denies   Back pain: denies      Monitoring:  Baseline:   None indicated  Continuation:  PFTs (at discretion): last 01/2024  FEV1/FVC 83%, FEV1 2.32 L/90%, FVC 2.81 L/87%; mid flows 95%; flow volume loop normal; bronchodilation study not done Impression-no obvious airflow obstruction; please correlate

## 2024-10-03 ENCOUNTER — PHARMACY VISIT (OUTPATIENT)
Facility: HOSPITAL | Age: 54
End: 2024-10-03

## 2024-10-03 DIAGNOSIS — J82.83 EOSINOPHILIC ASTHMA: Primary | ICD-10-CM

## 2024-10-03 RX ORDER — CETIRIZINE HYDROCHLORIDE 10 MG/1
10 TABLET ORAL DAILY
COMMUNITY

## 2024-10-03 ASSESSMENT — PATIENT HEALTH QUESTIONNAIRE - PHQ9
SUM OF ALL RESPONSES TO PHQ9 QUESTIONS 1 & 2: 0
SUM OF ALL RESPONSES TO PHQ QUESTIONS 1-9: 0
SUM OF ALL RESPONSES TO PHQ QUESTIONS 1-9: 0
2. FEELING DOWN, DEPRESSED OR HOPELESS: NOT AT ALL
SUM OF ALL RESPONSES TO PHQ QUESTIONS 1-9: 0
1. LITTLE INTEREST OR PLEASURE IN DOING THINGS: NOT AT ALL
SUM OF ALL RESPONSES TO PHQ QUESTIONS 1-9: 0

## 2025-02-06 ENCOUNTER — HOSPITAL ENCOUNTER (OUTPATIENT)
Facility: HOSPITAL | Age: 55
Setting detail: SPECIMEN
Discharge: HOME | End: 2025-02-09

## 2025-02-06 ENCOUNTER — HOSPITAL ENCOUNTER (OUTPATIENT)
Facility: HOSPITAL | Age: 55
Discharge: HOME | End: 2025-02-09
Payer: COMMERCIAL

## 2025-02-06 DIAGNOSIS — I10 ESSENTIAL HYPERTENSION, MALIGNANT: ICD-10-CM

## 2025-02-06 LAB
CHOLEST SERPL-MCNC: 204 MG/DL
HDLC SERPL-MCNC: 84 MG/DL (ref 40–60)
HDLC SERPL: 2.4 (ref 0–5)
LDLC SERPL CALC-MCNC: 104 MG/DL (ref 0–100)
LIPID PANEL: ABNORMAL
TRIGL SERPL-MCNC: 80 MG/DL
VLDLC SERPL CALC-MCNC: 16 MG/DL

## 2025-02-06 PROCEDURE — 80061 LIPID PANEL: CPT

## 2025-02-14 ENCOUNTER — TELEPHONE (OUTPATIENT)
Facility: HOSPITAL | Age: 55
End: 2025-02-14

## 2025-02-14 NOTE — TELEPHONE ENCOUNTER
Specialty Medication Service    Date: 2/14/2025  Patient's Name: Dariana Boo YOB: 1970            _____________________________________________________________________________________________    Sent fax to request most recent chart notes also sent refill request so we can get an sms rx on file before md goes on leave     Tony Hope MD  [facility] [place]  [address], [address] [place] [address]  P: [phone]; F: [phone]      Margareth Rodriguez CPhT  Clinical    Specialty Medication Services  Phone: [phone] option #4  Fax: [phone]    For Pharmacy Admin Tracking Only    Program: SMS  CPA in place:  Yes  Recommendation Provided To: Provider: 1 via Fax sent to office  Intervention Detail:   Intervention Accepted By: Provider: 0  Gap Closed?:    Time Spent (min): 10

## 2025-02-24 DIAGNOSIS — J82.83 EOSINOPHILIC ASTHMA: Primary | ICD-10-CM

## 2025-02-24 RX ORDER — MEPOLIZUMAB 100 MG/ML
INJECTION, SOLUTION SUBCUTANEOUS
Qty: 1 ML | Refills: 11 | Status: SHIPPED | OUTPATIENT
Start: 2025-02-24

## 2025-02-24 NOTE — TELEPHONE ENCOUNTER
Specialty Medication Service    Date: 2/24/2025  Patient's Name: Dariana Boo YOB: 1970            _____________________________________________________________________________________________     Dariana Boo is a 54 y.o.  female enrolled in the Specialty Medication Service program. Refill request received for Nucala.    Patient does have active CPA on file.   Patient last SMS pharmacist visit was within the last 6 months.  Patient last medical director visit was within the last year.  Patient has seen their specialist within the last year.   Labs are not necessary .   [facility] medical director referral is on file: yes  Next SMS visit is anticipated for 04/2025.   Correct [facility] department for prescribing yes    Chart reviewed. No significant concerns noted, patient is compliant with the program.     Will approve the refill for 12 refills, per the original provider order.      Orders Placed This Encounter    mepolizumab (NUCALA) 100 MG/ML SOAJ injection     Sig: Inject 100 mg (1 auto-injector) under the skin every 4 weeks (upper thigh, abdomen or arm)     Dispense:  1 mL     Refill:  11          Reynaldo Sandoval, [facility]  Ambulatory Clinical Pharmacist  Specialty Medication Services  Phone: [phone] option #4  Fax: [phone]     For Pharmacy Admin Tracking Only    Program: [facility]  CPA in place:  Yes  Recommendation Provided To: Patient/Caregiver: 1 via Telephone  Intervention Detail: Refill(s) Provided  Intervention Accepted By: Patient/Caregiver: 1    Time Spent (min): 10

## 2025-02-24 NOTE — TELEPHONE ENCOUNTER
Specialty Medication Service    Date: 2/24/2025  Patient's Name: Dariana Boo YOB: 1970            _____________________________________________________________________________________________    Dariana Boo is a 54 y.o. female enrolled in the Specialty Medication Service.     We received a refill request for Nucala on 02/24/25.     Original Rx was written for 1+11 fills on 02/14/25.     Thank you,    Margareth Rodriguez      Requested Prescriptions     Pending Prescriptions Disp Refills    mepolizumab (NUCALA) 100 MG/ML SOAJ injection 1 mL 11     Sig: Inject 100 mg (1 auto-injector) under the skin every 4 weeks (upper thigh, abdomen or arm)

## 2025-03-04 ENCOUNTER — TELEPHONE (OUTPATIENT)
Facility: HOSPITAL | Age: 55
End: 2025-03-04

## 2025-03-04 NOTE — TELEPHONE ENCOUNTER
Specialty Medication Service    Date: 3/4/2025  Patient's Name: Dariana Boo YOB: 1970            _____________________________________________________________________________________________    Spoke with patient to schedule Medical Director  and PharmD follow up appointment for Specialty Medication Services. Patient scheduled pharmD 03/13 5p and MD 04/29 540p      Margareth Rodriguez CPhT  Clinical   Specialty Medication Services  Phone: [phone] option #4  Fax: [phone]    For Pharmacy Admin Tracking Only    Program: SMS  CPA in place:  Yes  Recommendation Provided To: Patient/Caregiver: 2 via Telephone  Intervention Detail: Scheduled Appointment  Intervention Accepted By: Patient/Caregiver: 2  Gap Closed?:    Time Spent (min): 10

## 2025-03-12 NOTE — PROGRESS NOTES
Specialty Medication Service    Patient's Name: Dariana Boo YOB: 1970      Reason for visit: Dariana Boo is a 54 y.o. female presenting today for Specialty Medication Service visit follow up. Patient last seen by [facility] 10/03/2024 ([facility]). Patient continues on [facility] formulary medication, Nucala. Pharmacy completed Specialty Medication Service visit for medication monitoring and counseling. Medication list updated.    Specialty Medication: Nucala 100 mg/ml syringe   Frequency: every 30 days  Indication: J45.50 - Severe Peristent Asthma, Uncomplicated  Initially Diagnosed: A few years ago  Additional Therapy:   Breztri 2 puffs BID  Montelukast 10mg bedtime  Zyrtec 10mg BID  Albuterol as needed  Mucinex as needed  Flonase as needed  Previous Therapy:   Trelegy (vision impairment)     Specialist:   Tony Hope MD  [facility] [place]  [address], [address]  [place] [address]  P: [phone]; F: [phone]  Specialist Progress Note Available: Yes, scanned in Media tab  Last Specialist Visit: 08/22/2024: Asthma follow-up. Seems to be well controlled with Nucala started in April. No significant asthma symptoms, no needing long acting therapy on regular basis.     Plan:   Continue Nucala 100 mg every 4 weeks  Breztri 2 puff twice daily as needed  Montelukast 10 mg at bedtime  PRN Albuterol, rarely using  Off of home oxygen  Follow-up 6 months  with updated PFT      Patient states saw [facility] a few weeks ago for breathing test. Next follow-up April 10th, 2025.     Allergies   Allergen Reactions    Diphenhydramine Other (See Comments)     Angina    Fluticasone-Salmeterol Shortness Of Breath, Nausea And Vomiting, Other (See Comments) and Dizziness or Vertigo     Vision issues     Oxycodone-Acetaminophen Nausea And Vomiting    Fluticasone-Umeclidin-Vilant Dizziness or Vertigo and Other (See Comments)    Salmeterol Xinafoate Nausea And Vomiting and

## 2025-03-13 ENCOUNTER — PHARMACY VISIT (OUTPATIENT)
Facility: HOSPITAL | Age: 55
End: 2025-03-13

## 2025-03-13 DIAGNOSIS — J82.83 EOSINOPHILIC ASTHMA: Primary | ICD-10-CM

## 2025-03-13 ASSESSMENT — PROMIS GLOBAL HEALTH SCALE
IN GENERAL, WOULD YOU SAY YOUR HEALTH IS...[ON A SCALE OF 1 (POOR) TO 5 (EXCELLENT)]: GOOD
IN THE PAST 7 DAYS, HOW WOULD YOU RATE YOUR FATIGUE ON AVERAGE [ON A SCALE FROM 1 (NONE) TO 5 (VERY SEVERE)]?: MILD
IN GENERAL, PLEASE RATE HOW WELL YOU CARRY OUT YOUR USUAL SOCIAL ACTIVITIES (INCLUDES ACTIVITIES AT HOME, AT WORK, AND IN YOUR COMMUNITY, AND RESPONSIBILITIES AS A PARENT, CHILD, SPOUSE, EMPLOYEE, FRIEND, ETC) [ON A SCALE OF 1 (POOR) TO 5 (EXCELLENT)]?: VERY GOOD
IN GENERAL, HOW WOULD YOU RATE YOUR PHYSICAL HEALTH [ON A SCALE OF 1 (POOR) TO 5 (EXCELLENT)]?: GOOD
SUM OF RESPONSES TO QUESTIONS 2, 4, 5, & 10: 16
IN GENERAL, WOULD YOU SAY YOUR QUALITY OF LIFE IS...[ON A SCALE OF 1 (POOR) TO 5 (EXCELLENT)]: GOOD
IN THE PAST 7 DAYS, HOW WOULD YOU RATE YOUR PAIN ON AVERAGE [ON A SCALE FROM 0 (NO PAIN) TO 10 (WORST IMAGINABLE PAIN)]?: 2
IN GENERAL, HOW WOULD YOU RATE YOUR MENTAL HEALTH, INCLUDING YOUR MOOD AND YOUR ABILITY TO THINK [ON A SCALE OF 1 (POOR) TO 5 (EXCELLENT)]?: VERY GOOD
IN GENERAL, HOW WOULD YOU RATE YOUR SATISFACTION WITH YOUR SOCIAL ACTIVITIES AND RELATIONSHIPS [ON A SCALE OF 1 (POOR) TO 5 (EXCELLENT)]?: VERY GOOD
TO WHAT EXTENT ARE YOU ABLE TO CARRY OUT YOUR EVERYDAY PHYSICAL ACTIVITIES SUCH AS WALKING, CLIMBING STAIRS, CARRYING GROCERIES, OR MOVING A CHAIR [ON A SCALE OF 1 (NOT AT ALL) TO 5 (COMPLETELY)]?: COMPLETELY
SUM OF RESPONSES TO QUESTIONS 3, 6, 7, & 8: 14
IN THE PAST 7 DAYS, HOW OFTEN HAVE YOU BEEN BOTHERED BY EMOTIONAL PROBLEMS, SUCH AS FEELING ANXIOUS, DEPRESSED, OR IRRITABLE [ON A SCALE FROM 1 (NEVER) TO 5 (ALWAYS)]?: NEVER

## 2025-03-13 ASSESSMENT — PATIENT HEALTH QUESTIONNAIRE - PHQ9
SUM OF ALL RESPONSES TO PHQ QUESTIONS 1-9: 0
1. LITTLE INTEREST OR PLEASURE IN DOING THINGS: NOT AT ALL
SUM OF ALL RESPONSES TO PHQ QUESTIONS 1-9: 0
SUM OF ALL RESPONSES TO PHQ QUESTIONS 1-9: 0
2. FEELING DOWN, DEPRESSED OR HOPELESS: NOT AT ALL
SUM OF ALL RESPONSES TO PHQ QUESTIONS 1-9: 0

## 2025-03-18 LAB
CHOLEST SERPL-MCNC: 185 MG/DL
GLUCOSE SERPL-MCNC: 99 MG/DL (ref 74–99)
HDLC SERPL-MCNC: 79 MG/DL (ref 40–60)
LDLC SERPL CALC-MCNC: 87.2 MG/DL (ref 0–100)
TRIGL SERPL-MCNC: 94 MG/DL

## 2025-04-25 ENCOUNTER — TELEPHONE (OUTPATIENT)
Facility: HOSPITAL | Age: 55
End: 2025-04-25

## 2025-04-25 NOTE — TELEPHONE ENCOUNTER
Specialty Medication Service    Date: 4/25/2025  Patient's Name: Dariana Boo YOB: 1970            _____________________________________________________________________________________________    Faxed for chart notes     Tony Hope MD  [facility] [place]  [address], [address]  [place] [address]  P: [phone]; F: [phone]    Margareth Rodriguez CPhT  Clinical    Specialty Medication Services  Phone: [phone] option #4  Fax: [phone]    For Pharmacy Admin Tracking Only    Program: [facility]  CPA in place:  Yes  Recommendation Provided To: Provider: 1 via Fax sent to office  Intervention Accepted By: Provider: 0  Time Spent (min): 10

## 2025-04-29 ENCOUNTER — PHARMACY VISIT (OUTPATIENT)
Facility: HOSPITAL | Age: 55
End: 2025-04-29

## 2025-04-29 DIAGNOSIS — J82.83 EOSINOPHILIC ASTHMA: Primary | ICD-10-CM

## 2025-04-29 NOTE — PROGRESS NOTES
[facility]  Initial Specialty Medication Virtual Visit  [facility]  [address] [facility] [address]  Dept: [phone]  Loc: [phone]  Date of patient's visit: 4/30/2025  Patient's Name:  Dariana Boo YOB: 1970            Patient Care Team:  Bela Perales MD as PCP - General  Rodrigo Trinidad MD as Surgeon  Tony Hope MD (Pulmonary Disease)  ================================================================    This visit will not be billed      CC: datatracker program    HPI  Dariana Boo is 54 y.o. is here for initial virtual visit for specialty medication.      Severe asthma  Patient has chronic severe persistent asthma diagnosed several years ago. Patient has failed trial of multiple medications including Trelegy. Pt started on Nucala (~3/2024) which have work very well for her. Also takes Breztri, albuterol and singulair.   Follows with pulm.    Denies any GI symptoms, URI symptoms, fatigue, new skin rash, muscle ache/spasms or UTI symptoms.    No other concerns today    Review of Pertinent info from Pharmacist as below:  Specialty Medication: Nucala 100 mg/ml syringe   Frequency: every 30 days  Indication: J45.50 - Severe Peristent Asthma, Uncomplicated  Initially Diagnosed: A few years ago  Additional Therapy:   Breztri 2 puffs BID  Montelukast 10mg bedtime  Zyrtec 10mg BID  Albuterol as needed  Mucinex as needed  Flonase as needed  Previous Therapy:   Trelegy (vision impairment)     Specialist:   Tony Hope MD  [facility] [place]  [address] , [address] [place] [address]  P: [phone]; F: [phone]  Specialist Progress Note Available: Yes, scanned in Media tab  Last Specialist Visit: 08/22/2024: Asthma follow-up. Seems to be well controlled with Nucala started in April. No significant asthma symptoms, no needing long acting therapy on regular

## 2025-05-07 NOTE — TELEPHONE ENCOUNTER
Medication: Metoprolol 25 mg  Medication refill denied due to Pt no showed appt and courtesy refill was provided. Pt needs appt and labs. Per Lisa.     Per Dr. YAÑEZ [facility], patient does not need an appointment to be seen by her, ok to order and schedule next injection. Called patient to inform, Reached unidentified voicemail, left message, identified myself/facility/callback number, informed she will need to come into office to sign paperwork, requested return call to facility with any further questions or concerns. Order pended as before, please review and sign.

## 2025-05-20 ENCOUNTER — HOSPITAL ENCOUNTER (OUTPATIENT)
Facility: HOSPITAL | Age: 55
Discharge: HOME | End: 2025-05-23
Payer: COMMERCIAL

## 2025-05-20 ENCOUNTER — APPOINTMENT (OUTPATIENT)
Facility: HOSPITAL | Age: 55
End: 2025-05-20
Payer: COMMERCIAL

## 2025-05-20 DIAGNOSIS — J45.50 SEVERE PERSISTENT ASTHMA, UNSPECIFIED WHETHER COMPLICATED (HCC): ICD-10-CM

## 2025-05-20 PROCEDURE — 71046 X-RAY EXAM CHEST 2 VIEWS: CPT

## 2025-05-27 ENCOUNTER — TRANSCRIBE ORDERS (OUTPATIENT)
Facility: HOSPITAL | Age: 55
End: 2025-05-27

## 2025-05-27 DIAGNOSIS — M54.16 RADICULOPATHY, LUMBAR REGION: Primary | ICD-10-CM

## 2025-05-30 ENCOUNTER — HOSPITAL ENCOUNTER (OUTPATIENT)
Facility: HOSPITAL | Age: 55
Discharge: HOME | End: 2025-05-30
Attending: PHYSICAL MEDICINE & REHABILITATION
Payer: COMMERCIAL

## 2025-05-30 DIAGNOSIS — M54.16 RADICULOPATHY, LUMBAR REGION: ICD-10-CM

## 2025-05-30 PROCEDURE — 72148 MRI LUMBAR SPINE W/O DYE: CPT

## 2025-06-02 ENCOUNTER — TELEPHONE (OUTPATIENT)
Facility: HOSPITAL | Age: 55
End: 2025-06-02

## 2025-06-02 NOTE — TELEPHONE ENCOUNTER
Specialty Medication Service    Date: 6/2/2025  Patient's Name: Dariana Boo YOB: 1970            _____________________________________________________________________________________________    PA for patient's Nucala submitted to payer for approval. [facility] key: [id number]. Will continue to monitor for PA determination.     Reynaldo Sandoval, PharmD [facility]  Ambulatory Clinical Pharmacist  Specialty Medication Services  Phone: [phone] option #4  Fax: [phone]    For Pharmacy Admin Tracking Only    Program: SMS  CPA in place:  Yes  Recommendation Provided To: Other: 1  Intervention Detail: Benefit Assistance  Intervention Accepted By: Other: 0    Time Spent (min): 30

## 2025-06-04 NOTE — TELEPHONE ENCOUNTER
Specialty Medication Service    Date: 6/4/2025  Patient's Name: Dariana Boo YOB: 1970            _____________________________________________________________________________________________    Patient's PA for Nucala has been approved through 06/03/25-06/02/26. Approval letter uploaded in Media tab    Gloria Connolly CPhT  Pharmacy   Specialty Medication Services   Phone: [phone] option 4      For Pharmacy Admin Tracking Only    Program: [facility]  CPA in place:  Yes  Recommendation Provided To: Other: 1  Intervention Detail: Benefit Assistance  Intervention Accepted By: Other: 1  Gap Closed?:    Time Spent (min): 15

## (undated) DEVICE — (D)BNDG ADHESIVE FABRIC 3/4X3 -- DISC BY MFR USE ITEM 357960

## (undated) DEVICE — ZIMMER® STERILE DISPOSABLE TOURNIQUET CUFF WITH PROTECTIVE SLEEVE AND PLC, SINGLE PORT, SINGLE BLADDER, 34 IN. (86 CM)

## (undated) DEVICE — SNARE POLYP M W27MMXL240CM OVL STIFF DISP CAPTIVATOR

## (undated) DEVICE — TORCON NB, ADVANTAGE CATHETER: Brand: TORCON NB

## (undated) DEVICE — SYRINGE MED 10ML TRNSLUC BRL PLUNG BLK MRK POLYPR CTRL

## (undated) DEVICE — UNDERPAD INCONT W23XL36IN STD BLU POLYPR BK FLUF SFT

## (undated) DEVICE — PROCEDURE KIT FLUID MGMT 10 FR CUST MAINFOLD

## (undated) DEVICE — APPLICATOR MEDICATED 26 CC SOLUTION HI LT ORNG CHLORAPREP

## (undated) DEVICE — GUIDEWIRE VASC L260CM DIA0.035IN RAD 3MM J TIP L7CM PTFE

## (undated) DEVICE — SUTURE ABSORBABLE MONOFILAMENT 3-0 PS-2 27 IN UD MONOCRYL + SXMP1B109

## (undated) DEVICE — MEDIA CONTRAST 10ML 200MG/ML 41%

## (undated) DEVICE — TRAY MYEL SFTY +

## (undated) DEVICE — GUIDE WIRE INTRODUCER: Brand: INTRODUCER

## (undated) DEVICE — SUTURE MCRYL SZ 3-0 L27IN ABSRB UD PS-2 3/8 CIR REV CUT NDL MCP427H

## (undated) DEVICE — SUTURE PROL SZ 4-0 L18IN NONABSORBABLE BLU L19MM PS-2 3/8 8682G

## (undated) DEVICE — GUIDEWIRE ORTH DIA0.045IN S STL TRCR TIP LSR LN QUICKFIX

## (undated) DEVICE — (D)NDL SPNE 22GX15CM -- DISC BY MFR W/NO SUB

## (undated) DEVICE — 3.0MM PRECISION NEURO (MATCH HEAD)

## (undated) DEVICE — SHIELD RAD 14X16 IN W/ SCOOP ABSORBER

## (undated) DEVICE — CATHETER SUCT TR FL TIP 14FR W/ O CTRL

## (undated) DEVICE — SUTURE ABSORBABLE MONOFILAMENT 2-0 SH 27 IN VIO MONOCRYL + MCP317H

## (undated) DEVICE — SYRINGE MED 10ML LUERLOCK TIP W/O SFTY DISP

## (undated) DEVICE — SPLINT WR POS F/ARTERIAL ACC -- BX/10

## (undated) DEVICE — ENDOSCOPY PUMP TUBING/ CAP SET: Brand: ERBE

## (undated) DEVICE — CATH DIAG FR4 EXPO 5FRX100CM -- EXPO

## (undated) DEVICE — (D)SYR 10ML 1/5ML GRAD NSAF -- PKGING CHANGE USE ITEM 338027

## (undated) DEVICE — PRESSURE MONITORING SET: Brand: TRUWAVE

## (undated) DEVICE — SYRINGE MED 30ML STD CLR PLAS LUERLOCK TIP N CTRL DISP

## (undated) DEVICE — PACK PROCEDURE SURG CATH CUST

## (undated) DEVICE — C-ARMOR C-ARM EQUIPMENT COVERS CLEAR STERILE UNIVERSAL FIT 12 PER CASE: Brand: C-ARMOR

## (undated) DEVICE — YANKAUER,SMOOTH HANDLE,HIGH CAPACITY: Brand: MEDLINE INDUSTRIES, INC.

## (undated) DEVICE — Device

## (undated) DEVICE — ANCHOR FIX DISP FOR ANK FRAC SYS BB-TAK

## (undated) DEVICE — BANDAGE,ELASTIC,ESMARK,STERILE,6"X9',LF: Brand: MEDLINE

## (undated) DEVICE — INTENDED FOR TISSUE SEPARATION, AND OTHER PROCEDURES THAT REQUIRE A SHARP SURGICAL BLADE TO PUNCTURE OR CUT.: Brand: BARD-PARKER ® CARBON RIB-BACK BLADES

## (undated) DEVICE — STOPCOCK TRNSDUC 500PSI 3 W ROT M LUER LT BLU OFF HNDL R

## (undated) DEVICE — GOWN PLASTIC FILM THMBHKS UNIV BLUE: Brand: CARDINAL HEALTH

## (undated) DEVICE — GLOVE ORANGE PI 7 1/2   MSG9075

## (undated) DEVICE — CANNULA ORIG TL CLR W FOAM CUSHIONS AND 14FT SUPL TB 3 CHN

## (undated) DEVICE — DRESSING,GAUZE,XEROFORM,CURAD,1"X8",ST: Brand: CURAD

## (undated) DEVICE — ANGIOGRAPHIC CATHETER: Brand: EXPO™

## (undated) DEVICE — BANDAGE,GAUZE,BULKEE II,4.5"X4.1YD,STRL: Brand: MEDLINE

## (undated) DEVICE — MEDI-VAC NON-CONDUCTIVE SUCTION TUBING: Brand: CARDINAL HEALTH

## (undated) DEVICE — SYRINGE MED 50ML LUERSLIP TIP

## (undated) DEVICE — GAUZE,SPONGE,4"X4",16PLY,STRL,LF,10/TRAY: Brand: MEDLINE

## (undated) DEVICE — SYRINGE MED 25GA 3ML L5/8IN SUBQ PLAS W/ DETACH NDL SFTY

## (undated) DEVICE — SINGLE PORT MANIFOLD: Brand: NEPTUNE 2

## (undated) DEVICE — ELECTRODE PT RET AD L9FT HI MOIST COND ADH HYDRGEL CORDED

## (undated) DEVICE — FORCEP BX 3 2.2 MMX240 CM FOR ENDOSCP RADIAL JAW

## (undated) DEVICE — CANNULA NSL AD TBNG L14FT STD PVC O2 CRV CONN NONFLARED NSL

## (undated) DEVICE — GLIDESHEATH SLENDER STAINLESS STEEL KIT: Brand: GLIDESHEATH SLENDER

## (undated) DEVICE — SENSOR PLSE OXMTR AD CBL L36IN ADH FRM FIT SPO2 DISP

## (undated) DEVICE — WAX SURG 2.5GM HEMSTAT BNE BEESWAX PARAFFIN ISO PALMITATE

## (undated) DEVICE — GARMENT,MEDLINE,DVT,INT,CALF,MED, GEN2: Brand: MEDLINE

## (undated) DEVICE — SOLUTION IRRIG 500ML 0.9% SOD CHLO USP POUR PLAS BTL

## (undated) DEVICE — DRAPE,ANGIO,BRACH,STERILE,38X44: Brand: MEDLINE

## (undated) DEVICE — LINER SUCT CANSTR 3000CC PLAS SFT PRE ASSEMB W/OUT TBNG W/

## (undated) DEVICE — SPONGE LAPAROTOMY W18XL18IN WHITE STRUNG RADIOPAQUE STERILE

## (undated) DEVICE — DRAPE EQUIP CARM MINI STRL

## (undated) DEVICE — BAND RADIAL COMPR ARTERY 24CM -- REG BX/10

## (undated) DEVICE — NDL SPNE MANAN 22GX6IN --

## (undated) DEVICE — TUBING PRSS MON L24IN PVC RIG NONEXPANDING M TO FEM CONN

## (undated) DEVICE — COPILOT BLEEDBACK CONTROL VALVE: Brand: COPILOT

## (undated) DEVICE — SYRINGE 20ML LL S/C 50

## (undated) DEVICE — SNARE VASC L240CM LOOP W10MM SHTH DIA2.4MM RND STIFF CLD

## (undated) DEVICE — STRAP,POSITIONING,KNEE/BODY,FOAM,4X60": Brand: MEDLINE

## (undated) DEVICE — SOLUTION IRRIG 1000ML H2O STRL BLT

## (undated) DEVICE — CATHETER DIAG AD 5FR L100CM COR GRY HYDRPHLC NYL MPA 2 W/ 2

## (undated) DEVICE — PACK PROCEDURE SURG EXTREMITY CUST

## (undated) DEVICE — GUIDEWIRE VASC L185CM DIA0.014IN HYDRPHLC PTFE STR TIP

## (undated) DEVICE — PRECISION (9.0 X 0.51 X 25.0MM)

## (undated) DEVICE — SUTURE ABSORBABLE MONOFILAMENT 4-0 PS2 27 IN UD MONOCRYL + SXMP1B119